# Patient Record
Sex: FEMALE | Race: WHITE | NOT HISPANIC OR LATINO | Employment: FULL TIME | URBAN - METROPOLITAN AREA
[De-identification: names, ages, dates, MRNs, and addresses within clinical notes are randomized per-mention and may not be internally consistent; named-entity substitution may affect disease eponyms.]

---

## 2021-04-16 ENCOUNTER — OFFICE VISIT (OUTPATIENT)
Dept: INTERNAL MEDICINE | Facility: CLINIC | Age: 26
End: 2021-04-16

## 2021-04-16 VITALS
DIASTOLIC BLOOD PRESSURE: 80 MMHG | HEART RATE: 84 BPM | WEIGHT: 229 LBS | SYSTOLIC BLOOD PRESSURE: 140 MMHG | BODY MASS INDEX: 35.94 KG/M2 | TEMPERATURE: 97.3 F | HEIGHT: 67 IN

## 2021-04-16 DIAGNOSIS — Z76.89 ENCOUNTER TO ESTABLISH CARE: ICD-10-CM

## 2021-04-16 DIAGNOSIS — F33.9 EPISODE OF RECURRENT MAJOR DEPRESSIVE DISORDER, UNSPECIFIED DEPRESSION EPISODE SEVERITY (HCC): ICD-10-CM

## 2021-04-16 DIAGNOSIS — E03.9 HYPOTHYROIDISM, UNSPECIFIED TYPE: ICD-10-CM

## 2021-04-16 DIAGNOSIS — E66.01 MORBID OBESITY (HCC): ICD-10-CM

## 2021-04-16 DIAGNOSIS — I10 ESSENTIAL HYPERTENSION: Primary | ICD-10-CM

## 2021-04-16 PROBLEM — E55.9 VITAMIN D DEFICIENCY: Status: ACTIVE | Noted: 2021-04-16

## 2021-04-16 PROCEDURE — 99204 OFFICE O/P NEW MOD 45 MIN: CPT | Performed by: NURSE PRACTITIONER

## 2021-04-16 RX ORDER — NORETHINDRONE ACETATE AND ETHINYL ESTRADIOL .03; 1.5 MG/1; MG/1
1 TABLET ORAL DAILY
COMMUNITY
Start: 2019-08-16 | End: 2021-06-28 | Stop reason: SDUPTHER

## 2021-04-16 RX ORDER — ESCITALOPRAM OXALATE 20 MG/1
1 TABLET ORAL EVERY MORNING
COMMUNITY
Start: 2014-08-16 | End: 2022-01-24 | Stop reason: SDUPTHER

## 2021-04-16 RX ORDER — LAMOTRIGINE 200 MG/1
1 TABLET ORAL EVERY MORNING
COMMUNITY
Start: 2014-08-16 | End: 2022-03-07 | Stop reason: SDUPTHER

## 2021-04-16 RX ORDER — HYDROCHLOROTHIAZIDE 12.5 MG/1
12.5 TABLET ORAL DAILY
Qty: 30 TABLET | Refills: 0 | Status: SHIPPED | OUTPATIENT
Start: 2021-04-16 | End: 2021-05-07 | Stop reason: SDUPTHER

## 2021-04-16 RX ORDER — LEVOTHYROXINE SODIUM 0.05 MG/1
50 TABLET ORAL DAILY
Qty: 90 TABLET | Refills: 0 | Status: SHIPPED | OUTPATIENT
Start: 2021-04-16 | End: 2021-06-21 | Stop reason: SDUPTHER

## 2021-04-16 NOTE — PROGRESS NOTES
Isabell HARDIN  1995  [id number]  Patient Care Team:  Yamilex Khan APRN as PCP - General (Internal Medicine)    Isabell HARDIN is a 25 y.o. here today to establish care.    Previously under the care of Dr. Nereyda Almaguer at [facility]  Chief Complaint   Patient presents with   • Hypertension       HPI:   Juanjo is here to establish care.  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  New problem: high bp readings 150s/100s noticed about 3 wks ago, asymptomatic, sister was checking and she checked out of curiosity.  Juanjo was surprised it was high and continued to check. bp -145/85-90    Seeing Idalia Katz at [facility] nutritional counseling for disordered eating habits.  Obese since age 16.    MDD:  Seeing behavioral health at [facility].  On escitalopram and lamictal. Last yr lamictal inc from 150 to 200    Psoriasis:  Followed by erick Treviño.  Has tried few rounds of stellara and more recently tremfaya.    Abnormal TSH recently:  Saw Dr. Almaguer recently, labs done 3/29/21 but she was not fasting.  Juanjo has results:   A1C 6.4, Glucose 126 (not fasting)  (Was 5.9 in 2019)  TSH 4.8   Elevated liver function tests: ast 64, alt 69 (Alt 49 and ast 33 in 2019)    Obesity: Dieting since age 15, competitive swimmer until age 16, weight 145-177 sophomore year, did watchers and lost down to 145 and maintained 155-160 until graduation. By sophmore year of college was up to 190, did wt watchers again and down to 168, past 3 yrs gradually gained, ~30# of that in past 18 mo.    History reviewed. No pertinent past medical history.  History reviewed. No pertinent surgical history.  Family History   Problem Relation Age of Onset   • Hypertension Mother    • Bipolar disorder Father    • Arthritis Maternal Grandmother    • Depression Maternal Grandmother    • Hypertension Maternal Grandmother    • Hyperlipidemia Maternal Grandmother    • Cancer Maternal Grandfather   "       melanoma?   • Arthritis Paternal Grandmother    • Depression Paternal Grandmother    • Hypertension Paternal Grandmother    • Cancer Paternal Grandfather         lung (smoker)   • Heart disease Paternal Grandfather      Social History     Tobacco Use   Smoking Status Never Smoker   Smokeless Tobacco Never Used     No Known Allergies    Current Outpatient Medications:   •  escitalopram (LEXAPRO) 20 MG tablet, Take 1 tablet by mouth Every Morning., Disp: , Rfl:   •  lamoTRIgine (LaMICtal) 200 MG tablet, Take 1 tablet by mouth Every Morning., Disp: , Rfl:   •  Norethindrone Acet-Ethinyl Est (Tessa 1.5/30) 1.5-30 MG-MCG tablet, Take 1 tablet by mouth Daily., Disp: , Rfl:   •  hydroCHLOROthiazide (HYDRODIURIL) 12.5 MG tablet, Take 1 tablet by mouth Daily., Disp: 30 tablet, Rfl: 0  •  levothyroxine (Synthroid) 50 MCG tablet, Take 1 tablet by mouth Daily., Disp: 90 tablet, Rfl: 0    Review of Systems   Constitutional: Negative for chills, fatigue and fever.   HENT: Negative for congestion, ear pain and sinus pressure.    Respiratory: Negative for cough, chest tightness, shortness of breath and wheezing.    Cardiovascular: Negative for chest pain and palpitations.   Gastrointestinal: Negative for abdominal pain, blood in stool and constipation.   Neurological: Positive for headache. Negative for dizziness and speech difficulty.   Psychiatric/Behavioral: Negative for decreased concentration. The patient is not nervous/anxious.        /80 (BP Location: Left arm, Patient Position: Sitting, Cuff Size: Adult)   Pulse 84   Temp 97.3 °F (36.3 °C) (Temporal)   Ht 170 cm (66.93\")   Wt 104 kg (229 lb)   BMI 35.94 kg/m²     Physical Exam  Constitutional:       Appearance: She is well-developed. She is obese.   HENT:      Head: Normocephalic and atraumatic.      Comments: *wearing mask  Eyes:      Conjunctiva/sclera: Conjunctivae normal.      Pupils: Pupils are equal, round, and reactive to light.   Cardiovascular: "      Rate and Rhythm: Normal rate and regular rhythm.      Pulses: Normal pulses.      Heart sounds: Normal heart sounds.   Pulmonary:      Effort: Pulmonary effort is normal.      Breath sounds: Normal breath sounds.   Musculoskeletal:         General: Normal range of motion.      Cervical back: Normal range of motion and neck supple.   Skin:     General: Skin is warm and dry.   Neurological:      Mental Status: She is alert and oriented to person, place, and time.   Psychiatric:         Mood and Affect: Mood normal.         Behavior: Behavior normal.         Thought Content: Thought content normal.         Judgment: Judgment normal.         Results Review:  I reviewed the patient's new clinical results.    Assessment/Plan:  Patient Instructions   Problem List Items Addressed This Visit        Endocrine and Metabolic    Morbid obesity (CMS/HCC)    Overview     Depression, htn            Mental Health    Episode of recurrent major depressive disorder (CMS/[facility])    Relevant Medications    escitalopram (LEXAPRO) 20 MG tablet      Other Visit Diagnoses     Essential hypertension    -  Primary    trial hctz 12.5mg, rtc 3 wks, continue home monitoring, decrease sodium intake, increase physical activity, wt loss encouraged    Relevant Medications    hydroCHLOROthiazide (HYDRODIURIL) 12.5 MG tablet    Hypothyroidism, unspecified type        trial of levo 50mcg, f/u labs in 8-12 wks    Relevant Medications    levothyroxine (Synthroid) 50 MCG tablet    Encounter to establish care                 Diagnosis Plan   1. Essential hypertension      trial hctz 12.5mg, rtc 3 wks, continue home monitoring, decrease sodium intake, increase physical activity, wt loss encouraged   2. Hypothyroidism, unspecified type      trial of levo 50mcg, f/u labs in 8-12 wks   3. Morbid obesity (CMS/[facility])      continue nutritional counseling with Idalia Katz RD   4. Episode of recurrent major depressive disorder, unspecified depression episode  severity (CMS/HCC)      continue with provider at [facility], on lamictal and escitalopram   5. Encounter to establish care         Patient Instructions   Start hydrochlorothiazide 12.5mg every morning, drink lots of water.  Check BP at home and keep a log for your next visit.    •In general, adults should engage in aerobic physical activity 3-4 times a week with each session lasting an average of 40 minutes.  Goal for 150 minutes per week total.  •Moderate (brisk walking or jogging) to vigorous (running or biking) physical activity is recommended.  •There are many helpful strategies for heart-healthy eating, including the DASH diet and the [employer]'s Choose My Plate.   •Patients with high blood pressure should consume no more than 2,400 mg of sodium a day, ideally reducing sodium intake to 1,500 mg a day. However, even reducing sodium intake in one's current diet by 1,000 mg each day can help lower blood pressure.    Limit alcohol consumption.    Information obtained from the American College of Cardiology and American Heart Association.      Start levothyroxine 50mcg daily first thing in am.    Hypothyroidism    Hypothyroidism is when the thyroid gland does not make enough of certain hormones (it is underactive). The thyroid gland is a small gland located in the lower front part of the neck, just in front of the windpipe (trachea). This gland makes hormones that help control how the body uses food for energy (metabolism) as well as how the heart and brain function. These hormones also play a role in keeping your bones strong. When the thyroid is underactive, it produces too little of the hormones thyroxine (T4) and triiodothyronine (T3).  What are the causes?  This condition may be caused by:  · Hashimoto's disease. This is a disease in which the body's disease-fighting system (immune system) attacks the thyroid gland. This is the most common cause.  · Viral infections.  · Pregnancy.  · Certain medicines.  · Birth  defects.  · Past radiation treatments to the head or neck for cancer.  · Past treatment with radioactive iodine.  · Past exposure to radiation in the environment.  · Past surgical removal of part or all of the thyroid.  · Problems with a gland in the center of the brain (pituitary gland).  · Lack of enough iodine in the diet.  What increases the risk?  You are more likely to develop this condition if:  · You are female.  · You have a family history of thyroid conditions.  · You use a medicine called lithium.  · You take medicines that affect the immune system (immunosuppressants).  What are the signs or symptoms?  Symptoms of this condition include:  · Feeling as though you have no energy (lethargy).  · Not being able to tolerate cold.  · Weight gain that is not explained by a change in diet or exercise habits.  · Lack of appetite.  · Dry skin.  · Coarse hair.  · Menstrual irregularity.  · Slowing of thought processes.  · Constipation.  · Sadness or depression.  How is this diagnosed?  This condition may be diagnosed based on:  · Your symptoms, your medical history, and a physical exam.  · Blood tests.  You may also have imaging tests, such as an ultrasound or MRI.  How is this treated?  This condition is treated with medicine that replaces the thyroid hormones that your body does not make. After you begin treatment, it may take several weeks for symptoms to go away.  Follow these instructions at home:  · Take over-the-counter and prescription medicines only as told by your health care provider.  · If you start taking any new medicines, tell your health care provider.  · Keep all follow-up visits as told by your health care provider. This is important.  ? As your condition improves, your dosage of thyroid hormone medicine may change.  ? You will need to have blood tests regularly so that your health care provider can monitor your condition.  Contact a health care provider if:  · Your symptoms do not get better with  "treatment.  · You are taking thyroid replacement medicine and you:  ? Sweat a lot.  ? Have tremors.  ? Feel anxious.  ? Lose weight rapidly.  ? Cannot tolerate heat.  ? Have emotional swings.  ? Have diarrhea.  ? Feel weak.  Get help right away if you have:  · Chest pain.  · An irregular heartbeat.  · A rapid heartbeat.  · Difficulty breathing.  Summary  · Hypothyroidism is when the thyroid gland does not make enough of certain hormones (it is underactive).  · When the thyroid is underactive, it produces too little of the hormones thyroxine (T4) and triiodothyronine (T3).  · The most common cause is Hashimoto's disease, a disease in which the body's disease-fighting system (immune system) attacks the thyroid gland. The condition can also be caused by viral infections, medicine, pregnancy, or past radiation treatment to the head or neck.  · Symptoms may include weight gain, dry skin, constipation, feeling as though you do not have energy, and not being able to tolerate cold.  · This condition is treated with medicine to replace the thyroid hormones that your body does not make.  This information is not intended to replace advice given to you by your health care provider. Make sure you discuss any questions you have with your health care provider.  Document Revised: 11/30/2018 Document Reviewed: 11/28/2018  [employer] Patient Education © 2021 [employer]    https://www.nhlbi.nih.gov/files/docs/public/heart/dash_brief.pdf\">   DASH Eating Plan  DASH stands for Dietary Approaches to Stop Hypertension. The DASH eating plan is a healthy eating plan that has been shown to:  · Reduce high blood pressure (hypertension).  · Reduce your risk for type 2 diabetes, heart disease, and stroke.  · Help with weight loss.  What are tips for following this plan?  Reading food labels  · Check food labels for the amount of salt (sodium) per serving. Choose foods with less than 5 percent of the Daily Value of sodium. Generally, foods with " "less than 300 milligrams (mg) of sodium per serving fit into this eating plan.  · To find whole grains, look for the word \"whole\" as the first word in the ingredient list.  Shopping  · Buy products labeled as \"low-sodium\" or \"no salt added.\"  · Buy fresh foods. Avoid canned foods and pre-made or frozen meals.  Cooking  · Avoid adding salt when cooking. Use salt-free seasonings or herbs instead of table salt or sea salt. Check with your health care provider or pharmacist before using salt substitutes.  · Do not thomas foods. Cook foods using healthy methods such as baking, boiling, grilling, roasting, and broiling instead.  · Cook with heart-healthy oils, such as olive, canola, avocado, soybean, or sunflower oil.  Meal planning    · Eat a balanced diet that includes:  ? 4 or more servings of fruits and 4 or more servings of vegetables each day. Try to fill one-half of your plate with fruits and vegetables.  ? 6-8 servings of whole grains each day.  ? Less than 6 oz (170 g) of lean meat, poultry, or fish each day. A 3-oz (85-g) serving of meat is about the same size as a deck of cards. One egg equals 1 oz (28 g).  ? 2-3 servings of low-fat dairy each day. One serving is 1 cup (237 mL).  ? 1 serving of nuts, seeds, or beans 5 times each week.  ? 2-3 servings of heart-healthy fats. Healthy fats called omega-3 fatty acids are found in foods such as walnuts, flaxseeds, fortified milks, and eggs. These fats are also found in cold-water fish, such as sardines, salmon, and mackerel.  · Limit how much you eat of:  ? Canned or prepackaged foods.  ? Food that is high in trans fat, such as some fried foods.  ? Food that is high in saturated fat, such as fatty meat.  ? Desserts and other sweets, sugary drinks, and other foods with added sugar.  ? Full-fat dairy products.  · Do not salt foods before eating.  · Do not eat more than 4 egg yolks a week.  · Try to eat at least 2 vegetarian meals a week.  · Eat more home-cooked food and " less restaurant, buffet, and fast food.  Lifestyle  · When eating at a restaurant, ask that your food be prepared with less salt or no salt, if possible.  · If you drink alcohol:  ? Limit how much you use to:  § 0-1 drink a day for women who are not pregnant.  § 0-2 drinks a day for men.  ? Be aware of how much alcohol is in your drink. In the [place] one drink equals one 12 oz bottle of beer (355 mL), one 5 oz glass of wine (148 mL), or one 1½ oz glass of hard liquor (44 mL).  General information  · Avoid eating more than 2,300 mg of salt a day. If you have hypertension, you may need to reduce your sodium intake to 1,500 mg a day.  · Work with your health care provider to maintain a healthy body weight or to lose weight. Ask what an ideal weight is for you.  · Get at least 30 minutes of exercise that causes your heart to beat faster (aerobic exercise) most days of the week. Activities may include walking, swimming, or biking.  · Work with your health care provider or dietitian to adjust your eating plan to your individual calorie needs.  What foods should I eat?  Fruits  All fresh, dried, or frozen fruit. Canned fruit in natural juice (without added sugar).  Vegetables  Fresh or frozen vegetables (raw, steamed, roasted, or grilled). Low-sodium or reduced-sodium tomato and vegetable juice. Low-sodium or reduced-sodium tomato sauce and tomato paste. Low-sodium or reduced-sodium canned vegetables.  Grains  Whole-grain or whole-wheat bread. Whole-grain or whole-wheat pasta. Brown rice. Oatmeal. Quinoa. Bulgur. Whole-grain and low-sodium cereals. Stephanie bread. Low-fat, low-sodium crackers. Whole-wheat flour tortillas.  Meats and other proteins  Skinless chicken or turkey. Ground chicken or turkey. Pork with fat trimmed off. Fish and seafood. Egg whites. Dried beans, peas, or lentils. Unsalted nuts, nut butters, and seeds. Unsalted canned beans. Lean cuts of beef with fat trimmed off. Low-sodium, lean precooked or cured  meat, such as sausages or meat loaves.  Dairy  Low-fat (1%) or fat-free (skim) milk. Reduced-fat, low-fat, or fat-free cheeses. Nonfat, low-sodium ricotta or cottage cheese. Low-fat or nonfat yogurt. Low-fat, low-sodium cheese.  Fats and oils  Soft margarine without trans fats. Vegetable oil. Reduced-fat, low-fat, or light mayonnaise and salad dressings (reduced-sodium). Canola, safflower, olive, avocado, soybean, and sunflower oils. Avocado.  Seasonings and condiments  Herbs. Spices. Seasoning mixes without salt.  Other foods  Unsalted popcorn and pretzels. Fat-free sweets.  The items listed above may not be a complete list of foods and beverages you can eat. Contact a dietitian for more information.  What foods should I avoid?  Fruits  Canned fruit in a light or heavy syrup. Fried fruit. Fruit in cream or butter sauce.  Vegetables  Creamed or fried vegetables. Vegetables in a cheese sauce. Regular canned vegetables (not low-sodium or reduced-sodium). Regular canned tomato sauce and paste (not low-sodium or reduced-sodium). Regular tomato and vegetable juice (not low-sodium or reduced-sodium). Pickles. Olives.  Grains  Baked goods made with fat, such as croissants, muffins, or some breads. Dry pasta or rice meal packs.  Meats and other proteins  Fatty cuts of meat. Ribs. Fried meat. Payton. Bologna, salami, and other precooked or cured meats, such as sausages or meat loaves. Fat from the back of a pig (fatback). Bratwurst. Salted nuts and seeds. Canned beans with added salt. Canned or smoked fish. Whole eggs or egg yolks. Chicken or turkey with skin.  Dairy  Whole or 2% milk, cream, and half-and-half. Whole or full-fat cream cheese. Whole-fat or sweetened yogurt. Full-fat cheese. Nondairy creamers. Whipped toppings. Processed cheese and cheese spreads.  Fats and oils  Butter. Stick margarine. Lard. Shortening. Ghee. Payton fat. Tropical oils, such as coconut, palm kernel, or palm oil.  Seasonings and  condiments  Onion salt, garlic salt, seasoned salt, table salt, and sea salt. Worcestershire sauce. Tartar sauce. Barbecue sauce. Teriyaki sauce. Soy sauce, including reduced-sodium. Steak sauce. Canned and packaged gravies. Fish sauce. Oyster sauce. Cocktail sauce. Store-bought horseradish. Ketchup. Mustard. Meat flavorings and tenderizers. Bouillon cubes. Hot sauces. Pre-made or packaged marinades. Pre-made or packaged taco seasonings. Relishes. Regular salad dressings.  Other foods  Salted popcorn and pretzels.  The items listed above may not be a complete list of foods and beverages you should avoid. Contact a dietitian for more information.  Where to find more information  · National Heart, Lung, and Blood [place]: www.nhlbi.nih.gov  · American Heart Association: www.heart.org  · Academy of Nutrition and Dietetics: www.eatright.org  · National Kidney Foundation: www.kidney.org  Summary  · The DASH eating plan is a healthy eating plan that has been shown to reduce high blood pressure (hypertension). It may also reduce your risk for type 2 diabetes, heart disease, and stroke.  · When on the DASH eating plan, aim to eat more fresh fruits and vegetables, whole grains, lean proteins, low-fat dairy, and heart-healthy fats.  · With the DASH eating plan, you should limit salt (sodium) intake to 2,300 mg a day. If you have hypertension, you may need to reduce your sodium intake to 1,500 mg a day.  · Work with your health care provider or dietitian to adjust your eating plan to your individual calorie needs.  This information is not intended to replace advice given to you by your health care provider. Make sure you discuss any questions you have with your health care provider.  Document Revised: 11/20/2020 Document Reviewed: 11/20/2020  [employer]vier Patient Education © 2021 [employer]  Hydrochlorothiazide, HCTZ Oral Capsules or Tablets  What is this medicine?  HYDROCHLOROTHIAZIDE (aliyah droe klor oh THYE a zide) is a  diuretic. It helps you make more urine and to lose salt and excess water from your body. It treats swelling from heart, kidney, or liver disease. It also treats high blood pressure.  This medicine may be used for other purposes; ask your health care provider or pharmacist if you have questions.  COMMON BRAND NAME(S): Esidrix, Ezide, HydroDIURIL, Microzide, Oretic, Zide  What should I tell my health care provider before I take this medicine?  They need to know if you have any of these conditions:  · diabetes  · gout  · immune system problems, like lupus  · kidney disease or kidney stones  · liver disease  · pancreatitis  · small amount of urine or difficulty passing urine  · an unusual or allergic reaction to hydrochlorothiazide, sulfa drugs, other medicines, foods, dyes, or preservatives  · pregnant or trying to get pregnant  · breast-feeding  How should I use this medicine?  Take this drug by mouth. Take it as directed on the prescription label at the same time every day. You can take it with or without food. If it upsets your stomach, take it with food. Keep taking it unless your health care provider tells you to stop.  Talk to your health care provider about the use of this drug in children. While it may be prescribed for children as young as newborns for selected conditions, precautions do apply.  Overdosage: If you think you have taken too much of this medicine contact a poison control center or emergency room at once.  NOTE: This medicine is only for you. Do not share this medicine with others.  What if I miss a dose?  If you miss a dose, take it as soon as you can. If it is almost time for your next dose, take only that dose. Do not take double or extra doses.  What may interact with this medicine?  · cholestyramine  · colestipol  · digoxin  · dofetilide  · lithium  · medicines for blood pressure  · medicines for diabetes  · medicines that relax muscles for surgery  · other diuretics  · steroid medicines like  prednisone or cortisone  This list may not describe all possible interactions. Give your health care provider a list of all the medicines, herbs, non-prescription drugs, or dietary supplements you use. Also tell them if you smoke, drink alcohol, or use illegal drugs. Some items may interact with your medicine.  What should I watch for while using this medicine?  Visit your doctor or health care professional for regular checks on your progress. Check your blood pressure as directed. Ask your doctor or health care professional what your blood pressure should be and when you should contact him or her.  Talk to your health care professional about your risk of skin cancer. You may be more at risk for skin cancer if you take this medicine.  This medicine can make you more sensitive to the sun. Keep out of the sun. If you cannot avoid being in the sun, wear protective clothing and use sunscreen. Do not use sun lamps or tanning beds/booths.  You may need to be on a special diet while taking this medicine. Ask your doctor.  Check with your doctor or health care professional if you get an attack of severe diarrhea, nausea and vomiting, or if you sweat a lot. The loss of too much body fluid can make it dangerous for you to take this medicine.  You may get drowsy or dizzy. Do not drive, use machinery, or do anything that needs mental alertness until you know how this medicine affects you. Do not stand or sit up quickly, especially if you are an older patient. This reduces the risk of dizzy or fainting spells. Alcohol may interfere with the effect of this medicine. Avoid alcoholic drinks.  This medicine may increase blood sugar. Ask your healthcare provider if changes in diet or medicines are needed if you have diabetes.  What side effects may I notice from receiving this medicine?  Side effects that you should report to your doctor or health care professional as soon as possible:  · allergic reactions such as skin rash or  itching, hives, swelling of the lips, mouth, tongue, or throat  · changes in vision  · chest pain  · eye pain  · fast or irregular heartbeat  · feeling faint or lightheaded, falls  · gout attack  · muscle pain or cramps  · pain or difficulty when passing urine  · pain, tingling, numbness in the hands or feet  · redness, blistering, peeling or loosening of the skin, including inside the mouth  ·   signs and symptoms of high blood sugar such as being more thirsty or hungry or having to urinate more than normal. You may also feel very tired or have blurry vision.  · unusually weak  Side effects that usually do not require medical attention (report to your doctor or health care professional if they continue or are bothersome):  · change in sex drive or performance  · dry mouth  · headache  · stomach upset  This list may not describe all possible side effects. Call your doctor for medical advice about side effects. You may report side effects to FDA at 4-964-QBT-1785.  Where should I keep my medicine?  Keep out of the reach of children and pets.  Store at room temperature between 20 and 25 degrees C (68 and 77 degrees F). Protect from light and moisture. Keep the container tightly closed. Do not freeze. Throw away any unused drug after the expiration date.  NOTE: This sheet is a summary. It may not cover all possible information. If you have questions about this medicine, talk to your doctor, pharmacist, or health care provider.  © 2021 [employer] (2020-08-20 16:52:59)    Levothyroxine oral capsules  What is this medicine?  LEVOTHYROXINE (mary ellen voe thye EDINSON een) is a thyroid hormone. This medicine can improve symptoms of thyroid deficiency such as slow speech, lack of energy, weight gain, hair loss, dry skin, and feeling cold. It also helps to treat goiter (an enlarged thyroid gland). It is also used to treat some kinds of thyroid cancer along with surgery and other medicines.  This medicine may be used for  other purposes; ask your health care provider or pharmacist if you have questions.  COMMON BRAND NAME(S): Tirosint  What should I tell my health care provider before I take this medicine?  They need to know if you have any of these conditions:  · Mod[place]'s disease or other adrenal gland problem  · angina  · bone problems  · diabetes  · dieting or on a weight loss program  · fertility problems  · heart disease  · pituitary gland problem  · take medicines that treat or prevent blood clots  · an unusual or allergic reaction to levothyroxine, thyroid hormones, glycerin, glycerol, other medicines, foods, dyes, or preservatives  · pregnant or trying to get pregnant  · breast-feeding  How should I use this medicine?  Take this medicine by mouth with a glass of water. It is best to take on an empty stomach, at least 30 minutes to one hour before breakfast. Avoid taking antacids containing aluminum or magnesium, simethicone, bile acid sequestrants, calcium carbonate, sodium polystyrene sulfonate, ferrous sulfate, sevelamer, lanthanum, or sucralfate within 4 hours of taking this medicine. Do not cut, crush or chew this medicine. Follow the directions on the prescription label. Take at the same time each day. Do not take your medicine more often than directed.  Talk to your pediatrician regarding the use of this medicine in children. While this drug may be prescribed for selected conditions, precautions do apply. Since the capsules cannot be crushed or placed in water, they may only be given to infants and children who are able to swallow an intact capsule.  Overdosage: If you think you have taken too much of this medicine contact a poison control center or emergency room at once.  NOTE: This medicine is only for you. Do not share this medicine with others.  What if I miss a dose?  If you miss a dose, take it as soon as you can. If it is almost time for your next dose, take only that dose. Do not take double or extra  doses.  What may interact with this medicine?  · amiodarone  · antacids  · anti-thyroid medicines  · calcium supplements  · carbamazepine  · certain medicines for depression  · certain medicines to treat cancer  · cholestyramine  · clofibrate  · colesevelam  · colestipol  · digoxin  · female hormones, like estrogens and birth control pills, patches, rings, or injections  · iron supplements  · glyoxylate  · ketamine  · lanthanum  · liquid nutrition products like Ensure  · lithium  · medicines for colds and breathing difficulties  · medicines for diabetes  · medicines or dietary supplements for weight loss  · methadone  · niacin  · orlistat  · oxandrolone  · phenobarbital or other barbiturates  · phenytoin  · rifampin  · sevelamer  · simethicone  · sodium polystyrene sulfonate  · soy isoflavones  · steroid medicines like prednisone or cortisone  · sucralfate  · testosterone  · theophylline  · warfarin  This list may not describe all possible interactions. Give your health care provider a list of all the medicines, herbs, non-prescription drugs, or dietary supplements you use. Also tell them if you smoke, drink alcohol, or use illegal drugs. Some items may interact with your medicine.  What should I watch for while using this medicine?  Do not switch brands of this medicine unless your health care professional agrees with the change. Ask questions if you are uncertain.  You will need regular exams and occasional blood tests to check the response to treatment. If you are receiving this medicine for an underactive thyroid, it may be several weeks before you notice an improvement. Check with your doctor or health care professional if your symptoms do not improve.  It may be necessary for you to take this medicine for the rest of your life. Do not stop using this medicine unless your doctor or health care professional advises you to.  This medicine can affect blood sugar levels. If you have diabetes, check your blood sugar  as directed.  You may lose some of your hair when you first start treatment. With time, this usually corrects itself.  If you are going to have surgery, tell your doctor or health care professional that you are taking this medicine.  What side effects may I notice from receiving this medicine?  Side effects that you should report to your doctor or health care professional as soon as possible:  · allergic reactions like skin rash, itching or hives, swelling of the face, lips, or tongue  · anxious  · breathing problems  · changes in menstrual periods  · chest pain  · diarrhea  · excessive sweating or intolerance to heat  · fast or irregular heartbeat  · leg cramps  · nervousness  · swelling of ankles, feet, or legs  · tremors  · trouble sleeping  · vomiting  Side effects that usually do not require medical attention (report to your doctor or health care professional if they continue or are bothersome):  · changes in appetite  · headache  · irritable  · nausea  · weight loss  This list may not describe all possible side effects. Call your doctor for medical advice about side effects. You may report side effects to FDA at 8-308-FDA-4727.  Where should I keep my medicine?  Keep out of the reach of children.  Store at room temperature between 15 and 30 degrees C (59 and 86 degrees F). Protect from light and moisture. Keep container tightly closed. Throw away any unused medicine after the expiration date.  NOTE: This sheet is a summary. It may not cover all possible information. If you have questions about this medicine, talk to your doctor, pharmacist, or health care provider.  © 2021 [employer] (2020-12-10 13:23:17)        Plan of care reviewed with patient at the conclusion of today's visit. Education was provided regarding diagnosis and management.  Patient verbalizes understanding of and agreement with management plan.    Return in about 3 weeks (around 5/7/2021) for Recheck.    * Please note that portions  of this note were completed with a voice recognition program. Efforts were made to edit the dictation but occasionally words are transcribed.     Yamilex Khan APRN

## 2021-04-17 PROBLEM — E66.01 MORBID OBESITY: Status: ACTIVE | Noted: 2021-04-17

## 2021-04-17 PROBLEM — F33.9 EPISODE OF RECURRENT MAJOR DEPRESSIVE DISORDER: Status: ACTIVE | Noted: 2021-04-17

## 2021-04-17 NOTE — PATIENT INSTRUCTIONS
Start hydrochlorothiazide 12.5mg every morning, drink lots of water.  Check BP at home and keep a log for your next visit.    •In general, adults should engage in aerobic physical activity 3-4 times a week with each session lasting an average of 40 minutes.  Goal for 150 minutes per week total.  •Moderate (brisk walking or jogging) to vigorous (running or biking) physical activity is recommended.  •There are many helpful strategies for heart-healthy eating, including the DASH diet and the Mommy Nearest's Choose My Plate.   •Patients with high blood pressure should consume no more than 2,400 mg of sodium a day, ideally reducing sodium intake to 1,500 mg a day. However, even reducing sodium intake in one's current diet by 1,000 mg each day can help lower blood pressure.    Limit alcohol consumption.    Information obtained from the American College of Cardiology and American Heart Association.      Start levothyroxine 50mcg daily first thing in am.    Hypothyroidism    Hypothyroidism is when the thyroid gland does not make enough of certain hormones (it is underactive). The thyroid gland is a small gland located in the lower front part of the neck, just in front of the windpipe (trachea). This gland makes hormones that help control how the body uses food for energy (metabolism) as well as how the heart and brain function. These hormones also play a role in keeping your bones strong. When the thyroid is underactive, it produces too little of the hormones thyroxine (T4) and triiodothyronine (T3).  What are the causes?  This condition may be caused by:  · Hashimoto's disease. This is a disease in which the body's disease-fighting system (immune system) attacks the thyroid gland. This is the most common cause.  · Viral infections.  · Pregnancy.  · Certain medicines.  · Birth defects.  · Past radiation treatments to the head or neck for cancer.  · Past treatment with radioactive iodine.  · Past exposure to radiation in the  environment.  · Past surgical removal of part or all of the thyroid.  · Problems with a gland in the center of the brain (pituitary gland).  · Lack of enough iodine in the diet.  What increases the risk?  You are more likely to develop this condition if:  · You are female.  · You have a family history of thyroid conditions.  · You use a medicine called lithium.  · You take medicines that affect the immune system (immunosuppressants).  What are the signs or symptoms?  Symptoms of this condition include:  · Feeling as though you have no energy (lethargy).  · Not being able to tolerate cold.  · Weight gain that is not explained by a change in diet or exercise habits.  · Lack of appetite.  · Dry skin.  · Coarse hair.  · Menstrual irregularity.  · Slowing of thought processes.  · Constipation.  · Sadness or depression.  How is this diagnosed?  This condition may be diagnosed based on:  · Your symptoms, your medical history, and a physical exam.  · Blood tests.  You may also have imaging tests, such as an ultrasound or MRI.  How is this treated?  This condition is treated with medicine that replaces the thyroid hormones that your body does not make. After you begin treatment, it may take several weeks for symptoms to go away.  Follow these instructions at home:  · Take over-the-counter and prescription medicines only as told by your health care provider.  · If you start taking any new medicines, tell your health care provider.  · Keep all follow-up visits as told by your health care provider. This is important.  ? As your condition improves, your dosage of thyroid hormone medicine may change.  ? You will need to have blood tests regularly so that your health care provider can monitor your condition.  Contact a health care provider if:  · Your symptoms do not get better with treatment.  · You are taking thyroid replacement medicine and you:  ? Sweat a lot.  ? Have tremors.  ? Feel anxious.  ? Lose weight rapidly.  ? Cannot  "tolerate heat.  ? Have emotional swings.  ? Have diarrhea.  ? Feel weak.  Get help right away if you have:  · Chest pain.  · An irregular heartbeat.  · A rapid heartbeat.  · Difficulty breathing.  Summary  · Hypothyroidism is when the thyroid gland does not make enough of certain hormones (it is underactive).  · When the thyroid is underactive, it produces too little of the hormones thyroxine (T4) and triiodothyronine (T3).  · The most common cause is Hashimoto's disease, a disease in which the body's disease-fighting system (immune system) attacks the thyroid gland. The condition can also be caused by viral infections, medicine, pregnancy, or past radiation treatment to the head or neck.  · Symptoms may include weight gain, dry skin, constipation, feeling as though you do not have energy, and not being able to tolerate cold.  · This condition is treated with medicine to replace the thyroid hormones that your body does not make.  This information is not intended to replace advice given to you by your health care provider. Make sure you discuss any questions you have with your health care provider.  Document Revised: 11/30/2018 Document Reviewed: 11/28/2018  [employer] Patient Education © 2021 [employer]    https://www.nhlbi.nih.gov/files/docs/public/heart/dash_brief.pdf\">   DASH Eating Plan  DASH stands for Dietary Approaches to Stop Hypertension. The DASH eating plan is a healthy eating plan that has been shown to:  · Reduce high blood pressure (hypertension).  · Reduce your risk for type 2 diabetes, heart disease, and stroke.  · Help with weight loss.  What are tips for following this plan?  Reading food labels  · Check food labels for the amount of salt (sodium) per serving. Choose foods with less than 5 percent of the Daily Value of sodium. Generally, foods with less than 300 milligrams (mg) of sodium per serving fit into this eating plan.  · To find whole grains, look for the word \"whole\" as the first word in " "the ingredient list.  Shopping  · Buy products labeled as \"low-sodium\" or \"no salt added.\"  · Buy fresh foods. Avoid canned foods and pre-made or frozen meals.  Cooking  · Avoid adding salt when cooking. Use salt-free seasonings or herbs instead of table salt or sea salt. Check with your health care provider or pharmacist before using salt substitutes.  · Do not thomas foods. Cook foods using healthy methods such as baking, boiling, grilling, roasting, and broiling instead.  · Cook with heart-healthy oils, such as olive, canola, avocado, soybean, or sunflower oil.  Meal planning    · Eat a balanced diet that includes:  ? 4 or more servings of fruits and 4 or more servings of vegetables each day. Try to fill one-half of your plate with fruits and vegetables.  ? 6-8 servings of whole grains each day.  ? Less than 6 oz (170 g) of lean meat, poultry, or fish each day. A 3-oz (85-g) serving of meat is about the same size as a deck of cards. One egg equals 1 oz (28 g).  ? 2-3 servings of low-fat dairy each day. One serving is 1 cup (237 mL).  ? 1 serving of nuts, seeds, or beans 5 times each week.  ? 2-3 servings of heart-healthy fats. Healthy fats called omega-3 fatty acids are found in foods such as walnuts, flaxseeds, fortified milks, and eggs. These fats are also found in cold-water fish, such as sardines, salmon, and mackerel.  · Limit how much you eat of:  ? Canned or prepackaged foods.  ? Food that is high in trans fat, such as some fried foods.  ? Food that is high in saturated fat, such as fatty meat.  ? Desserts and other sweets, sugary drinks, and other foods with added sugar.  ? Full-fat dairy products.  · Do not salt foods before eating.  · Do not eat more than 4 egg yolks a week.  · Try to eat at least 2 vegetarian meals a week.  · Eat more home-cooked food and less restaurant, buffet, and fast food.  Lifestyle  · When eating at a restaurant, ask that your food be prepared with less salt or no salt, if " possible.  · If you drink alcohol:  ? Limit how much you use to:  § 0-1 drink a day for women who are not pregnant.  § 0-2 drinks a day for men.  ? Be aware of how much alcohol is in your drink. In the [place] one drink equals one 12 oz bottle of beer (355 mL), one 5 oz glass of wine (148 mL), or one 1½ oz glass of hard liquor (44 mL).  General information  · Avoid eating more than 2,300 mg of salt a day. If you have hypertension, you may need to reduce your sodium intake to 1,500 mg a day.  · Work with your health care provider to maintain a healthy body weight or to lose weight. Ask what an ideal weight is for you.  · Get at least 30 minutes of exercise that causes your heart to beat faster (aerobic exercise) most days of the week. Activities may include walking, swimming, or biking.  · Work with your health care provider or dietitian to adjust your eating plan to your individual calorie needs.  What foods should I eat?  Fruits  All fresh, dried, or frozen fruit. Canned fruit in natural juice (without added sugar).  Vegetables  Fresh or frozen vegetables (raw, steamed, roasted, or grilled). Low-sodium or reduced-sodium tomato and vegetable juice. Low-sodium or reduced-sodium tomato sauce and tomato paste. Low-sodium or reduced-sodium canned vegetables.  Grains  Whole-grain or whole-wheat bread. Whole-grain or whole-wheat pasta. Brown rice. Oatmeal. Quinoa. Bulgur. Whole-grain and low-sodium cereals. Stephanie bread. Low-fat, low-sodium crackers. Whole-wheat flour tortillas.  Meats and other proteins  Skinless chicken or turkey. Ground chicken or turkey. Pork with fat trimmed off. Fish and seafood. Egg whites. Dried beans, peas, or lentils. Unsalted nuts, nut butters, and seeds. Unsalted canned beans. Lean cuts of beef with fat trimmed off. Low-sodium, lean precooked or cured meat, such as sausages or meat loaves.  Dairy  Low-fat (1%) or fat-free (skim) milk. Reduced-fat, low-fat, or fat-free cheeses. Nonfat, low-sodium  ricotta or cottage cheese. Low-fat or nonfat yogurt. Low-fat, low-sodium cheese.  Fats and oils  Soft margarine without trans fats. Vegetable oil. Reduced-fat, low-fat, or light mayonnaise and salad dressings (reduced-sodium). Canola, safflower, olive, avocado, soybean, and sunflower oils. Avocado.  Seasonings and condiments  Herbs. Spices. Seasoning mixes without salt.  Other foods  Unsalted popcorn and pretzels. Fat-free sweets.  The items listed above may not be a complete list of foods and beverages you can eat. Contact a dietitian for more information.  What foods should I avoid?  Fruits  Canned fruit in a light or heavy syrup. Fried fruit. Fruit in cream or butter sauce.  Vegetables  Creamed or fried vegetables. Vegetables in a cheese sauce. Regular canned vegetables (not low-sodium or reduced-sodium). Regular canned tomato sauce and paste (not low-sodium or reduced-sodium). Regular tomato and vegetable juice (not low-sodium or reduced-sodium). Pickles. Olives.  Grains  Baked goods made with fat, such as croissants, muffins, or some breads. Dry pasta or rice meal packs.  Meats and other proteins  Fatty cuts of meat. Ribs. Fried meat. Payton. Bologna, salami, and other precooked or cured meats, such as sausages or meat loaves. Fat from the back of a pig (fatback). Bratwurst. Salted nuts and seeds. Canned beans with added salt. Canned or smoked fish. Whole eggs or egg yolks. Chicken or turkey with skin.  Dairy  Whole or 2% milk, cream, and half-and-half. Whole or full-fat cream cheese. Whole-fat or sweetened yogurt. Full-fat cheese. Nondairy creamers. Whipped toppings. Processed cheese and cheese spreads.  Fats and oils  Butter. Stick margarine. Lard. Shortening. Ghee. Payton fat. Tropical oils, such as coconut, palm kernel, or palm oil.  Seasonings and condiments  Onion salt, garlic salt, seasoned salt, table salt, and sea salt. Worcestershire sauce. Tartar sauce. Barbecue sauce. Teriyaki sauce. Soy sauce,  including reduced-sodium. Steak sauce. Canned and packaged gravies. Fish sauce. Oyster sauce. Cocktail sauce. Store-bought horseradish. Ketchup. Mustard. Meat flavorings and tenderizers. Bouillon cubes. Hot sauces. Pre-made or packaged marinades. Pre-made or packaged taco seasonings. Relishes. Regular salad dressings.  Other foods  Salted popcorn and pretzels.  The items listed above may not be a complete list of foods and beverages you should avoid. Contact a dietitian for more information.  Where to find more information  · National Heart, Lung, and [place]: www.nhlbi.nih.gov  · American Heart Association: www.heart.org  · Academy of Nutrition and Dietetics: www.eatright.org  · National Kidney Foundation: www.kidney.org  Summary  · The DASH eating plan is a healthy eating plan that has been shown to reduce high blood pressure (hypertension). It may also reduce your risk for type 2 diabetes, heart disease, and stroke.  · When on the DASH eating plan, aim to eat more fresh fruits and vegetables, whole grains, lean proteins, low-fat dairy, and heart-healthy fats.  · With the DASH eating plan, you should limit salt (sodium) intake to 2,300 mg a day. If you have hypertension, you may need to reduce your sodium intake to 1,500 mg a day.  · Work with your health care provider or dietitian to adjust your eating plan to your individual calorie needs.  This information is not intended to replace advice given to you by your health care provider. Make sure you discuss any questions you have with your health care provider.  Document Revised: 11/20/2020 Document Reviewed: 11/20/2020  [employer] Patient Education © 2021 [employer]  Hydrochlorothiazide, HCTZ Oral Capsules or Tablets  What is this medicine?  HYDROCHLOROTHIAZIDE (aliyah droe klor oh THYE a zide) is a diuretic. It helps you make more urine and to lose salt and excess water from your body. It treats swelling from heart, kidney, or liver disease. It also treats  high blood pressure.  This medicine may be used for other purposes; ask your health care provider or pharmacist if you have questions.  COMMON BRAND NAME(S): Esidrix, Ezide, HydroDIURIL, Microzide, Oretic, Zide  What should I tell my health care provider before I take this medicine?  They need to know if you have any of these conditions:  · diabetes  · gout  · immune system problems, like lupus  · kidney disease or kidney stones  · liver disease  · pancreatitis  · small amount of urine or difficulty passing urine  · an unusual or allergic reaction to hydrochlorothiazide, sulfa drugs, other medicines, foods, dyes, or preservatives  · pregnant or trying to get pregnant  · breast-feeding  How should I use this medicine?  Take this drug by mouth. Take it as directed on the prescription label at the same time every day. You can take it with or without food. If it upsets your stomach, take it with food. Keep taking it unless your health care provider tells you to stop.  Talk to your health care provider about the use of this drug in children. While it may be prescribed for children as young as newborns for selected conditions, precautions do apply.  Overdosage: If you think you have taken too much of this medicine contact a poison control center or emergency room at once.  NOTE: This medicine is only for you. Do not share this medicine with others.  What if I miss a dose?  If you miss a dose, take it as soon as you can. If it is almost time for your next dose, take only that dose. Do not take double or extra doses.  What may interact with this medicine?  · cholestyramine  · colestipol  · digoxin  · dofetilide  · lithium  · medicines for blood pressure  · medicines for diabetes  · medicines that relax muscles for surgery  · other diuretics  · steroid medicines like prednisone or cortisone  This list may not describe all possible interactions. Give your health care provider a list of all the medicines, herbs,  non-prescription drugs, or dietary supplements you use. Also tell them if you smoke, drink alcohol, or use illegal drugs. Some items may interact with your medicine.  What should I watch for while using this medicine?  Visit your doctor or health care professional for regular checks on your progress. Check your blood pressure as directed. Ask your doctor or health care professional what your blood pressure should be and when you should contact him or her.  Talk to your health care professional about your risk of skin cancer. You may be more at risk for skin cancer if you take this medicine.  This medicine can make you more sensitive to the sun. Keep out of the sun. If you cannot avoid being in the sun, wear protective clothing and use sunscreen. Do not use sun lamps or tanning beds/booths.  You may need to be on a special diet while taking this medicine. Ask your doctor.  Check with your doctor or health care professional if you get an attack of severe diarrhea, nausea and vomiting, or if you sweat a lot. The loss of too much body fluid can make it dangerous for you to take this medicine.  You may get drowsy or dizzy. Do not drive, use machinery, or do anything that needs mental alertness until you know how this medicine affects you. Do not stand or sit up quickly, especially if you are an older patient. This reduces the risk of dizzy or fainting spells. Alcohol may interfere with the effect of this medicine. Avoid alcoholic drinks.  This medicine may increase blood sugar. Ask your healthcare provider if changes in diet or medicines are needed if you have diabetes.  What side effects may I notice from receiving this medicine?  Side effects that you should report to your doctor or health care professional as soon as possible:  · allergic reactions such as skin rash or itching, hives, swelling of the lips, mouth, tongue, or throat  · changes in vision  · chest pain  · eye pain  · fast or irregular heartbeat  · feeling  faint or lightheaded, falls  · gout attack  · muscle pain or cramps  · pain or difficulty when passing urine  · pain, tingling, numbness in the hands or feet  · redness, blistering, peeling or loosening of the skin, including inside the mouth  ·   signs and symptoms of high blood sugar such as being more thirsty or hungry or having to urinate more than normal. You may also feel very tired or have blurry vision.  · unusually weak  Side effects that usually do not require medical attention (report to your doctor or health care professional if they continue or are bothersome):  · change in sex drive or performance  · dry mouth  · headache  · stomach upset  This list may not describe all possible side effects. Call your doctor for medical advice about side effects. You may report side effects to FDA at 3-057-FDA-5617.  Where should I keep my medicine?  Keep out of the reach of children and pets.  Store at room temperature between 20 and 25 degrees C (68 and 77 degrees F). Protect from light and moisture. Keep the container tightly closed. Do not freeze. Throw away any unused drug after the expiration date.  NOTE: This sheet is a summary. It may not cover all possible information. If you have questions about this medicine, talk to your doctor, pharmacist, or health care provider.  © 2021 [employer] (2020-08-20 16:52:59)    Levothyroxine oral capsules  What is this medicine?  LEVOTHYROXINE (mary ellen voe thye EDINSON een) is a thyroid hormone. This medicine can improve symptoms of thyroid deficiency such as slow speech, lack of energy, weight gain, hair loss, dry skin, and feeling cold. It also helps to treat goiter (an enlarged thyroid gland). It is also used to treat some kinds of thyroid cancer along with surgery and other medicines.  This medicine may be used for other purposes; ask your health care provider or pharmacist if you have questions.  COMMON BRAND NAME(S): Tirosint  What should I tell my health care  provider before I take this medicine?  They need to know if you have any of these conditions:  · Jackson's disease or other adrenal gland problem  · angina  · bone problems  · diabetes  · dieting or on a weight loss program  · fertility problems  · heart disease  · pituitary gland problem  · take medicines that treat or prevent blood clots  · an unusual or allergic reaction to levothyroxine, thyroid hormones, glycerin, glycerol, other medicines, foods, dyes, or preservatives  · pregnant or trying to get pregnant  · breast-feeding  How should I use this medicine?  Take this medicine by mouth with a glass of water. It is best to take on an empty stomach, at least 30 minutes to one hour before breakfast. Avoid taking antacids containing aluminum or magnesium, simethicone, bile acid sequestrants, calcium carbonate, sodium polystyrene sulfonate, ferrous sulfate, sevelamer, lanthanum, or sucralfate within 4 hours of taking this medicine. Do not cut, crush or chew this medicine. Follow the directions on the prescription label. Take at the same time each day. Do not take your medicine more often than directed.  Talk to your pediatrician regarding the use of this medicine in children. While this drug may be prescribed for selected conditions, precautions do apply. Since the capsules cannot be crushed or placed in water, they may only be given to infants and children who are able to swallow an intact capsule.  Overdosage: If you think you have taken too much of this medicine contact a poison control center or emergency room at once.  NOTE: This medicine is only for you. Do not share this medicine with others.  What if I miss a dose?  If you miss a dose, take it as soon as you can. If it is almost time for your next dose, take only that dose. Do not take double or extra doses.  What may interact with this medicine?  · amiodarone  · antacids  · anti-thyroid medicines  · calcium supplements  · carbamazepine  · certain medicines  for depression  · certain medicines to treat cancer  · cholestyramine  · clofibrate  · colesevelam  · colestipol  · digoxin  · female hormones, like estrogens and birth control pills, patches, rings, or injections  · iron supplements  · glyoxylate  · ketamine  · lanthanum  · liquid nutrition products like Ensure  · lithium  · medicines for colds and breathing difficulties  · medicines for diabetes  · medicines or dietary supplements for weight loss  · methadone  · niacin  · orlistat  · oxandrolone  · phenobarbital or other barbiturates  · phenytoin  · rifampin  · sevelamer  · simethicone  · sodium polystyrene sulfonate  · soy isoflavones  · steroid medicines like prednisone or cortisone  · sucralfate  · testosterone  · theophylline  · warfarin  This list may not describe all possible interactions. Give your health care provider a list of all the medicines, herbs, non-prescription drugs, or dietary supplements you use. Also tell them if you smoke, drink alcohol, or use illegal drugs. Some items may interact with your medicine.  What should I watch for while using this medicine?  Do not switch brands of this medicine unless your health care professional agrees with the change. Ask questions if you are uncertain.  You will need regular exams and occasional blood tests to check the response to treatment. If you are receiving this medicine for an underactive thyroid, it may be several weeks before you notice an improvement. Check with your doctor or health care professional if your symptoms do not improve.  It may be necessary for you to take this medicine for the rest of your life. Do not stop using this medicine unless your doctor or health care professional advises you to.  This medicine can affect blood sugar levels. If you have diabetes, check your blood sugar as directed.  You may lose some of your hair when you first start treatment. With time, this usually corrects itself.  If you are going to have surgery, tell  your doctor or health care professional that you are taking this medicine.  What side effects may I notice from receiving this medicine?  Side effects that you should report to your doctor or health care professional as soon as possible:  · allergic reactions like skin rash, itching or hives, swelling of the face, lips, or tongue  · anxious  · breathing problems  · changes in menstrual periods  · chest pain  · diarrhea  · excessive sweating or intolerance to heat  · fast or irregular heartbeat  · leg cramps  · nervousness  · swelling of ankles, feet, or legs  · tremors  · trouble sleeping  · vomiting  Side effects that usually do not require medical attention (report to your doctor or health care professional if they continue or are bothersome):  · changes in appetite  · headache  · irritable  · nausea  · weight loss  This list may not describe all possible side effects. Call your doctor for medical advice about side effects. You may report side effects to FDA at 0-245-FDA-1279.  Where should I keep my medicine?  Keep out of the reach of children.  Store at room temperature between 15 and 30 degrees C (59 and 86 degrees F). Protect from light and moisture. Keep container tightly closed. Throw away any unused medicine after the expiration date.  NOTE: This sheet is a summary. It may not cover all possible information. If you have questions about this medicine, talk to your doctor, pharmacist, or health care provider.  © 2021 Elsevier/Gold Standard (2020-12-10 13:23:17)

## 2021-04-19 PROBLEM — N92.6 ABNORMAL MENSTRUAL PERIODS: Status: ACTIVE | Noted: 2021-04-19

## 2021-04-19 PROBLEM — L40.9 PSORIASIS: Status: ACTIVE | Noted: 2021-04-19

## 2021-05-07 ENCOUNTER — OFFICE VISIT (OUTPATIENT)
Dept: INTERNAL MEDICINE | Facility: CLINIC | Age: 26
End: 2021-05-07

## 2021-05-07 VITALS
HEART RATE: 92 BPM | SYSTOLIC BLOOD PRESSURE: 158 MMHG | WEIGHT: 233 LBS | DIASTOLIC BLOOD PRESSURE: 84 MMHG | HEIGHT: 67 IN | TEMPERATURE: 97.7 F | BODY MASS INDEX: 36.57 KG/M2

## 2021-05-07 DIAGNOSIS — R10.9 LEFT SIDED ABDOMINAL PAIN: Primary | ICD-10-CM

## 2021-05-07 DIAGNOSIS — N92.6 ABNORMAL MENSTRUAL PERIODS: ICD-10-CM

## 2021-05-07 PROCEDURE — 99214 OFFICE O/P EST MOD 30 MIN: CPT | Performed by: NURSE PRACTITIONER

## 2021-05-07 RX ORDER — HYDROCHLOROTHIAZIDE 12.5 MG/1
12.5 TABLET ORAL DAILY
Qty: 90 TABLET | Refills: 0 | Status: SHIPPED | OUTPATIENT
Start: 2021-05-07 | End: 2021-07-19

## 2021-05-07 NOTE — PATIENT INSTRUCTIONS
Refer to gyn for left sided abdominal pain.  Consider pcos evaluation.    Goal bp 120/70.  If higher than 130/80 consistently increase hctz to 25mg daily.     Check BP at home and keep a log for your next visit.    •In general, adults should engage in aerobic physical activity 3-4 times a week with each session lasting an average of 40 minutes.  Goal for 150 minutes per week total.  •Moderate (brisk walking or jogging) to vigorous (running or biking) physical activity is recommended.  •There are many helpful strategies for heart-healthy eating, including the DASH diet and the [employer]'s Choose My Plate.   •Patients with high blood pressure should consume no more than 2,400 mg of sodium a day, ideally reducing sodium intake to 1,500 mg a day. However, even reducing sodium intake in one's current diet by 1,000 mg each day can help lower blood pressure.    Limit alcohol consumption.    Information obtained from the American College of Cardiology and American Heart Association.

## 2021-05-07 NOTE — PROGRESS NOTES
Isabell Oakes  1995  [id number]  Patient Care Team:  Yamilex Khan APRN as PCP - General (Internal Medicine)    Isabell Oakes is a pleasant 25 y.o. female who presents for evaluation of Hypertension (3 week follow up )      Chief Complaint   Patient presents with   • Hypertension     3 week follow up        HPI:   3 week f/u for high bp and hypothyroidism.  Started both hctz 12.5mg and levo 50mcg last visit. Home readings down to avg 135/85-90 since starting hctz.  TSH was 4.84 on 3/29/21.   Left sided abd pain several days a week for about 6 mo, upper and lower, not related to eating.  No constipation or diarrhea.    No reg periods on oral jacinda    History reviewed. No pertinent past medical history.  Past Surgical History:   Procedure Laterality Date   • TONSILLECTOMY  2001     Family History   Problem Relation Age of Onset   • Hypertension Mother    • Bipolar disorder Father    • Arthritis Maternal Grandmother    • Depression Maternal Grandmother    • Hypertension Maternal Grandmother    • Hyperlipidemia Maternal Grandmother    • Cancer Maternal Grandfather         melanoma?   • Arthritis Paternal Grandmother    • Depression Paternal Grandmother    • Hypertension Paternal Grandmother    • Cancer Paternal Grandfather         lung (smoker)   • Heart disease Paternal Grandfather      Social History     Tobacco Use   Smoking Status Never Smoker   Smokeless Tobacco Never Used     No Known Allergies    Current Outpatient Medications:   •  escitalopram (LEXAPRO) 20 MG tablet, Take 1 tablet by mouth Every Morning., Disp: , Rfl:   •  hydroCHLOROthiazide (HYDRODIURIL) 12.5 MG tablet, Take 1 tablet by mouth Daily., Disp: 90 tablet, Rfl: 0  •  lamoTRIgine (LaMICtal) 200 MG tablet, Take 1 tablet by mouth Every Morning., Disp: , Rfl:   •  levothyroxine (Synthroid) 50 MCG tablet, Take 1 tablet by mouth Daily., Disp: 90 tablet, Rfl: 0  •  Norethindrone Acet-Ethinyl Est (Tessa 1.5/30) 1.5-30 MG-MCG tablet, Take 1  "tablet by mouth Daily., Disp: , Rfl:     Review of Systems  /84 (BP Location: Left arm, Patient Position: Sitting, Cuff Size: Adult)   Pulse 92   Temp 97.7 °F (36.5 °C) (Temporal)   Ht 170 cm (66.93\")   Wt 106 kg (233 lb)   BMI 36.57 kg/m²     Physical Exam  Vitals reviewed.   Constitutional:       Appearance: Normal appearance. She is well-developed.   HENT:      Head: Normocephalic.      Comments: *wearing mask     Right Ear: External ear normal.      Left Ear: External ear normal.   Eyes:      Conjunctiva/sclera: Conjunctivae normal.      Pupils: Pupils are equal, round, and reactive to light.   Cardiovascular:      Rate and Rhythm: Normal rate and regular rhythm.      Pulses: Normal pulses.      Heart sounds: Normal heart sounds.   Pulmonary:      Effort: Pulmonary effort is normal.      Breath sounds: Normal breath sounds.   Abdominal:      General: Abdomen is flat. Bowel sounds are normal.      Palpations: Abdomen is soft.      Tenderness: There is abdominal tenderness in the left upper quadrant and left lower quadrant.   Musculoskeletal:         General: Normal range of motion.      Cervical back: Normal range of motion and neck supple.   Skin:     General: Skin is warm and dry.   Neurological:      Mental Status: She is alert and oriented to person, place, and time.   Psychiatric:         Mood and Affect: Mood normal.         Behavior: Behavior normal.         Thought Content: Thought content normal.         Judgment: Judgment normal.         Procedures    Results Review:  None    PHQ-9 Total Score:      Assessment/Plan:  Diagnoses and all orders for this visit:    1. Left sided abdominal pain (Primary)  -     Ambulatory Referral to Gynecology    2. Abnormal menstrual periods    Other orders  -     hydroCHLOROthiazide (HYDRODIURIL) 12.5 MG tablet; Take 1 tablet by mouth Daily.  Dispense: 90 tablet; Refill: 0       Patient Instructions   Refer to gyn for left sided abdominal pain.  Consider pcos " evaluation.    Goal bp 120/70.  If higher than 130/80 consistently increase hctz to 25mg daily.     Check BP at home and keep a log for your next visit.    •In general, adults should engage in aerobic physical activity 3-4 times a week with each session lasting an average of 40 minutes.  Goal for 150 minutes per week total.  •Moderate (brisk walking or jogging) to vigorous (running or biking) physical activity is recommended.  •There are many helpful strategies for heart-healthy eating, including the DASH diet and the [employer]'s Choose My Plate.   •Patients with high blood pressure should consume no more than 2,400 mg of sodium a day, ideally reducing sodium intake to 1,500 mg a day. However, even reducing sodium intake in one's current diet by 1,000 mg each day can help lower blood pressure.    Limit alcohol consumption.    Information obtained from the American College of Cardiology and American Heart Association.      Plan of care reviewed with patient at the conclusion of today's visit. Education was provided regarding diagnosis, management and any prescribed or recommended OTC medications.  Patient verbalizes understanding of and agreement with management plan.    Return in about 6 weeks (around 6/18/2021) for Recheck.    *Note that portions of this note were completed with a voice recognition program.  Efforts were made to edit the dictation but occasionally words are transcribed.    VIVIANE Rowan          Answers for HPI/ROS submitted by the patient on 5/7/2021  What is the primary reason for your visit?: High Blood Pressure

## 2021-06-18 ENCOUNTER — LAB (OUTPATIENT)
Dept: LAB | Facility: HOSPITAL | Age: 26
End: 2021-06-18

## 2021-06-18 ENCOUNTER — OFFICE VISIT (OUTPATIENT)
Dept: INTERNAL MEDICINE | Facility: CLINIC | Age: 26
End: 2021-06-18

## 2021-06-18 VITALS
HEIGHT: 67 IN | DIASTOLIC BLOOD PRESSURE: 72 MMHG | BODY MASS INDEX: 36.1 KG/M2 | HEART RATE: 92 BPM | TEMPERATURE: 97.1 F | SYSTOLIC BLOOD PRESSURE: 118 MMHG | WEIGHT: 230 LBS

## 2021-06-18 DIAGNOSIS — E55.9 VITAMIN D DEFICIENCY: ICD-10-CM

## 2021-06-18 DIAGNOSIS — R73.01 IMPAIRED FASTING GLUCOSE: ICD-10-CM

## 2021-06-18 DIAGNOSIS — I10 BENIGN ESSENTIAL HYPERTENSION: Primary | ICD-10-CM

## 2021-06-18 DIAGNOSIS — E03.9 HYPOTHYROIDISM, UNSPECIFIED TYPE: ICD-10-CM

## 2021-06-18 LAB
ALBUMIN SERPL-MCNC: 4.8 G/DL (ref 3.5–5.2)
ALBUMIN/GLOB SERPL: 1.5 G/DL
ALP SERPL-CCNC: 105 U/L (ref 39–117)
ALT SERPL W P-5'-P-CCNC: 62 U/L (ref 1–33)
ANION GAP SERPL CALCULATED.3IONS-SCNC: 11.8 MMOL/L (ref 5–15)
AST SERPL-CCNC: 51 U/L (ref 1–32)
BILIRUB SERPL-MCNC: 0.3 MG/DL (ref 0–1.2)
BUN SERPL-MCNC: 13 MG/DL (ref 6–20)
BUN/CREAT SERPL: 19.7 (ref 7–25)
CALCIUM SPEC-SCNC: 9.7 MG/DL (ref 8.6–10.5)
CHLORIDE SERPL-SCNC: 101 MMOL/L (ref 98–107)
CO2 SERPL-SCNC: 23.2 MMOL/L (ref 22–29)
CREAT SERPL-MCNC: 0.66 MG/DL (ref 0.57–1)
GFR SERPL CREATININE-BSD FRML MDRD: 110 ML/MIN/1.73
GLOBULIN UR ELPH-MCNC: 3.1 GM/DL
GLUCOSE SERPL-MCNC: 114 MG/DL (ref 65–99)
POTASSIUM SERPL-SCNC: 4 MMOL/L (ref 3.5–5.2)
PROT SERPL-MCNC: 7.9 G/DL (ref 6–8.5)
SODIUM SERPL-SCNC: 136 MMOL/L (ref 136–145)

## 2021-06-18 PROCEDURE — 85025 COMPLETE CBC W/AUTO DIFF WBC: CPT

## 2021-06-18 PROCEDURE — 84439 ASSAY OF FREE THYROXINE: CPT

## 2021-06-18 PROCEDURE — 99214 OFFICE O/P EST MOD 30 MIN: CPT | Performed by: NURSE PRACTITIONER

## 2021-06-18 PROCEDURE — 82306 VITAMIN D 25 HYDROXY: CPT

## 2021-06-18 PROCEDURE — 83036 HEMOGLOBIN GLYCOSYLATED A1C: CPT

## 2021-06-18 PROCEDURE — 84443 ASSAY THYROID STIM HORMONE: CPT

## 2021-06-18 PROCEDURE — 80053 COMPREHEN METABOLIC PANEL: CPT

## 2021-06-18 NOTE — PROGRESS NOTES
Isabell Oakes  1995  [id number]  Patient Care Team:  Yamilex Khan APRN as PCP - General (Internal Medicine)    Isabell Oakes is a pleasant 25 y.o. female who presents for evaluation of Abdominal Pain (6 week follow up ) and Hypertension    Chief Complaint   Patient presents with   • Abdominal Pain     6 week follow up    • Hypertension       HPI:   Juanjo is feeling well.  Taking the hydrocholorothiazide 12.5mg.  She did go up to the 25mg dose one day after last visit but had some weakness and lightheadedness so she went back to the 12.5mg dose.  Checking blood pressure at home less frequently.  Yesterday it was 135/86.  She is not exercising currently.  We started Levothyroxine 50mg 4/16/21 and says she has more energy.  Never had constipation or skin issues.      History reviewed. No pertinent past medical history.  Past Surgical History:   Procedure Laterality Date   • TONSILLECTOMY  2001     Family History   Problem Relation Age of Onset   • Hypertension Mother    • Bipolar disorder Father    • Arthritis Maternal Grandmother    • Depression Maternal Grandmother    • Hypertension Maternal Grandmother    • Hyperlipidemia Maternal Grandmother    • Cancer Maternal Grandfather         melanoma?   • Arthritis Paternal Grandmother    • Depression Paternal Grandmother    • Hypertension Paternal Grandmother    • Cancer Paternal Grandfather         lung (smoker)   • Heart disease Paternal Grandfather      Social History     Tobacco Use   Smoking Status Never Smoker   Smokeless Tobacco Never Used     No Known Allergies    Current Outpatient Medications:   •  escitalopram (LEXAPRO) 20 MG tablet, Take 1 tablet by mouth Every Morning., Disp: , Rfl:   •  hydroCHLOROthiazide (HYDRODIURIL) 12.5 MG tablet, Take 1 tablet by mouth Daily., Disp: 90 tablet, Rfl: 0  •  lamoTRIgine (LaMICtal) 200 MG tablet, Take 1 tablet by mouth Every Morning., Disp: , Rfl:   •  levothyroxine (Synthroid) 50 MCG tablet, Take 1 tablet  "by mouth Daily., Disp: 90 tablet, Rfl: 0  •  Norethindrone Acet-Ethinyl Est (Tessa 1.5/30) 1.5-30 MG-MCG tablet, Take 1 tablet by mouth Daily., Disp: , Rfl:     Review of Systems  /72 (BP Location: Left arm, Patient Position: Sitting, Cuff Size: Large Adult)   Pulse 92   Temp 97.1 °F (36.2 °C) (Temporal)   Ht 170 cm (66.93\")   Wt 104 kg (230 lb)   BMI 36.10 kg/m²     Physical Exam  Constitutional:       Appearance: She is well-developed.   HENT:      Head: Normocephalic and atraumatic.      Comments: *wearing mask  Eyes:      Conjunctiva/sclera: Conjunctivae normal.      Pupils: Pupils are equal, round, and reactive to light.   Cardiovascular:      Rate and Rhythm: Normal rate and regular rhythm.      Pulses: Normal pulses.      Heart sounds: Normal heart sounds.   Pulmonary:      Effort: Pulmonary effort is normal.      Breath sounds: Normal breath sounds.   Musculoskeletal:         General: Normal range of motion.      Cervical back: Normal range of motion and neck supple.   Skin:     General: Skin is warm and dry.   Neurological:      Mental Status: She is alert and oriented to person, place, and time.   Psychiatric:         Mood and Affect: Mood normal.         Behavior: Behavior normal.         Thought Content: Thought content normal.         Judgment: Judgment normal.         Procedures    Results Review:  None    PHQ-9 Total Score:      Assessment/Plan:  Diagnoses and all orders for this visit:    1. Benign essential hypertension (Primary)  Comments:  Check BP at home, goal 130/70. If higher need to adjust medications. Email me in 2 weeks with your readings for follow-up, continue HCTZ 12.5 mg for now, low-so    2. Vitamin D deficiency  Comments:  Check with labs  Orders:  -     Vitamin D 25 Hydroxy; Future    3. Hypothyroidism, unspecified type  Comments:  Recheck TSH today  Orders:  -     Comprehensive Metabolic Panel; Future  -     TSH Rfx On Abnormal To Free T4; Future    4. Impaired fasting " glucose  -     CBC & Differential; Future  -     Hemoglobin A1c; Future       Patient Instructions   Goal /70. Check at home for the next 1 to 2 weeks and email me with your readings. You may need an adjustment in your medication. For now, continue medications as prescribed.  Check BP at home and keep a log for your next visit.    •In general, adults should engage in aerobic physical activity 3-4 times a week with each session lasting an average of 40 minutes.  Goal for 150 minutes per week total.  •Moderate (brisk walking or jogging) to vigorous (running or biking) physical activity is recommended.  •There are many helpful strategies for heart-healthy eating, including the DASH diet and the [employer]'s Choose My Plate.   •Patients with high blood pressure should consume no more than 2,400 mg of sodium a day, ideally reducing sodium intake to 1,500 mg a day. However, even reducing sodium intake in one's current diet by 1,000 mg each day can help lower blood pressure.    Limit alcohol consumption.    Information obtained from the American College of Cardiology and American Heart Association.      Recheck TSH, continue levothyroxine    Plan of care reviewed with patient at the conclusion of today's visit. Education was provided regarding diagnosis, management and any prescribed or recommended OTC medications.  Patient verbalizes understanding of and agreement with management plan.    Return in about 3 months (around 9/18/2021) for Labs this visit.    *Note that portions of this note were completed with a voice recognition program.  Efforts were made to edit the dictation but occasionally words are transcribed.    VIVIANE Rowan          Answers for HPI/ROS submitted by the patient on 6/18/2021  What is the primary reason for your visit?: High Blood Pressure

## 2021-06-19 LAB
25(OH)D3 SERPL-MCNC: 39.5 NG/ML (ref 30–100)
BASOPHILS # BLD AUTO: 0.06 10*3/MM3 (ref 0–0.2)
BASOPHILS NFR BLD AUTO: 0.6 % (ref 0–1.5)
DEPRECATED RDW RBC AUTO: 40.4 FL (ref 37–54)
EOSINOPHIL # BLD AUTO: 0.16 10*3/MM3 (ref 0–0.4)
EOSINOPHIL NFR BLD AUTO: 1.6 % (ref 0.3–6.2)
ERYTHROCYTE [DISTWIDTH] IN BLOOD BY AUTOMATED COUNT: 13 % (ref 12.3–15.4)
HBA1C MFR BLD: 6.3 % (ref 4.8–5.6)
HCT VFR BLD AUTO: 40.8 % (ref 34–46.6)
HGB BLD-MCNC: 13.5 G/DL (ref 12–15.9)
IMM GRANULOCYTES # BLD AUTO: 0.04 10*3/MM3 (ref 0–0.05)
IMM GRANULOCYTES NFR BLD AUTO: 0.4 % (ref 0–0.5)
LYMPHOCYTES # BLD AUTO: 2.63 10*3/MM3 (ref 0.7–3.1)
LYMPHOCYTES NFR BLD AUTO: 26.5 % (ref 19.6–45.3)
MCH RBC QN AUTO: 28.5 PG (ref 26.6–33)
MCHC RBC AUTO-ENTMCNC: 33.1 G/DL (ref 31.5–35.7)
MCV RBC AUTO: 86.3 FL (ref 79–97)
MONOCYTES # BLD AUTO: 0.51 10*3/MM3 (ref 0.1–0.9)
MONOCYTES NFR BLD AUTO: 5.1 % (ref 5–12)
NEUTROPHILS NFR BLD AUTO: 6.52 10*3/MM3 (ref 1.7–7)
NEUTROPHILS NFR BLD AUTO: 65.8 % (ref 42.7–76)
NRBC BLD AUTO-RTO: 0 /100 WBC (ref 0–0.2)
PLATELET # BLD AUTO: 348 10*3/MM3 (ref 140–450)
PMV BLD AUTO: 10.9 FL (ref 6–12)
RBC # BLD AUTO: 4.73 10*6/MM3 (ref 3.77–5.28)
T4 FREE SERPL-MCNC: 1.2 NG/DL (ref 0.93–1.7)
TSH SERPL DL<=0.05 MIU/L-ACNC: 4.95 UIU/ML (ref 0.27–4.2)
WBC # BLD AUTO: 9.92 10*3/MM3 (ref 3.4–10.8)

## 2021-06-21 PROBLEM — R79.89 ELEVATED LIVER FUNCTION TESTS: Status: ACTIVE | Noted: 2021-06-21

## 2021-06-21 PROBLEM — I10 BENIGN ESSENTIAL HYPERTENSION: Status: ACTIVE | Noted: 2021-06-21

## 2021-06-21 PROBLEM — R73.03 PREDIABETES: Status: ACTIVE | Noted: 2021-06-21

## 2021-06-21 RX ORDER — LEVOTHYROXINE SODIUM 0.07 MG/1
75 TABLET ORAL DAILY
Qty: 90 TABLET | Refills: 0 | Status: SHIPPED | OUTPATIENT
Start: 2021-06-21 | End: 2021-09-17

## 2021-06-21 NOTE — PATIENT INSTRUCTIONS
Goal /70. Check at home for the next 1 to 2 weeks and email me with your readings. You may need an adjustment in your medication. For now, continue medications as prescribed.  Check BP at home and keep a log for your next visit.    •In general, adults should engage in aerobic physical activity 3-4 times a week with each session lasting an average of 40 minutes.  Goal for 150 minutes per week total.  •Moderate (brisk walking or jogging) to vigorous (running or biking) physical activity is recommended.  •There are many helpful strategies for heart-healthy eating, including the DASH diet and the [employer]'s Choose My Plate.   •Patients with high blood pressure should consume no more than 2,400 mg of sodium a day, ideally reducing sodium intake to 1,500 mg a day. However, even reducing sodium intake in one's current diet by 1,000 mg each day can help lower blood pressure.    Limit alcohol consumption.    Information obtained from the American College of Cardiology and American Heart Association.      Recheck TSH, continue levothyroxine

## 2021-06-28 ENCOUNTER — PATIENT MESSAGE (OUTPATIENT)
Dept: INTERNAL MEDICINE | Facility: CLINIC | Age: 26
End: 2021-06-28

## 2021-06-28 RX ORDER — NORETHINDRONE ACETATE AND ETHINYL ESTRADIOL .03; 1.5 MG/1; MG/1
1 TABLET ORAL DAILY
Qty: 1 EACH | Refills: 5 | Status: SHIPPED | OUTPATIENT
Start: 2021-06-28 | End: 2021-12-14

## 2021-06-28 NOTE — TELEPHONE ENCOUNTER
From: Isabell Oakes  To: VIVIANE Rowan  Sent: 6/28/2021 1:12 PM EDT  Subject: Prescription Question    Chicho Kaminski!    I am out of refills on my birth control, Tessa & my previous doctor was the one writing the prescription. Is it possible for you to call that into my pharmacy?    Thanks!

## 2021-07-19 ENCOUNTER — PATIENT MESSAGE (OUTPATIENT)
Dept: INTERNAL MEDICINE | Facility: CLINIC | Age: 26
End: 2021-07-19

## 2021-07-19 RX ORDER — HYDROCHLOROTHIAZIDE 12.5 MG/1
TABLET ORAL
Qty: 90 TABLET | Refills: 1 | Status: SHIPPED | OUTPATIENT
Start: 2021-07-19 | End: 2021-07-20 | Stop reason: SDUPTHER

## 2021-07-20 RX ORDER — HYDROCHLOROTHIAZIDE 25 MG/1
25 TABLET ORAL DAILY
Qty: 90 TABLET | Refills: 1 | Status: SHIPPED | OUTPATIENT
Start: 2021-07-20 | End: 2022-01-17 | Stop reason: SDUPTHER

## 2021-09-09 ENCOUNTER — TELEMEDICINE (OUTPATIENT)
Dept: INTERNAL MEDICINE | Facility: CLINIC | Age: 26
End: 2021-09-09

## 2021-09-09 DIAGNOSIS — R73.03 PREDIABETES: ICD-10-CM

## 2021-09-09 DIAGNOSIS — E66.01 MORBID OBESITY (HCC): ICD-10-CM

## 2021-09-09 DIAGNOSIS — J01.00 ACUTE NON-RECURRENT MAXILLARY SINUSITIS: Primary | ICD-10-CM

## 2021-09-09 DIAGNOSIS — R05.9 COUGH: ICD-10-CM

## 2021-09-09 DIAGNOSIS — I10 BENIGN ESSENTIAL HYPERTENSION: ICD-10-CM

## 2021-09-09 PROCEDURE — 99213 OFFICE O/P EST LOW 20 MIN: CPT | Performed by: PHYSICIAN ASSISTANT

## 2021-09-09 RX ORDER — PROMETHAZINE HYDROCHLORIDE AND CODEINE PHOSPHATE 6.25; 1 MG/5ML; MG/5ML
5 SOLUTION ORAL NIGHTLY
Qty: 120 ML | Refills: 0 | Status: SHIPPED | OUTPATIENT
Start: 2021-09-09 | End: 2021-10-04

## 2021-09-09 RX ORDER — AZITHROMYCIN 250 MG/1
TABLET, FILM COATED ORAL
Qty: 6 TABLET | Refills: 0 | Status: SHIPPED | OUTPATIENT
Start: 2021-09-09 | End: 2021-10-04

## 2021-09-09 RX ORDER — FLUTICASONE PROPIONATE 50 MCG
2 SPRAY, SUSPENSION (ML) NASAL DAILY
Qty: 9.9 ML | Refills: 1 | Status: SHIPPED | OUTPATIENT
Start: 2021-09-09 | End: 2021-10-04

## 2021-09-09 NOTE — PROGRESS NOTES
"Patient Care Team:  Yamilex Khan APRN as PCP - General (Internal Medicine)    Chief Complaint::   Chief Complaint   Patient presents with   • Cough   • Headache   • URI      You have chosen to receive care through a video visit. Do you consent to use a video  visit for your medical care today? Yes  Subjective     HPI  Juanjo is a 25 year old female with history of hypertension, obesity, and prediabetes, who presents for evaluation of cough, sinus congestion, and headache.  Symptoms started 9 days ago. COVID test was negative.  Currently is a teacher at [employer].  She has had both Covid vaccinations and they were completed in January/February. She did get another COVID test last night, results pending.  She has taken mucinex maximum strength for sinus, delselym, nyquil, advil cough drops, nose sprays, without any improvement.. She had body aches last week and feels \"hot\" this week. Prone to seasonal sinus infection.  She denies chest pain, shortness of breath, fever, or loss of taste or smell.  She is compliant with her medications, but reports that she has not been checking her blood pressures at home.        The following portions of the patient's history were reviewed and updated as appropriate: active problem list, medication list, allergies, family history, social history    Review of Systems:   Review of Systems   Constitutional: Positive for activity change and fatigue. Negative for appetite change, diaphoresis, fever, unexpected weight gain and unexpected weight loss.   HENT: Positive for congestion and sinus pressure. Negative for hearing loss and sore throat.    Eyes: Negative for visual disturbance.   Respiratory: Positive for cough. Negative for chest tightness and shortness of breath.    Cardiovascular: Negative for chest pain, palpitations and leg swelling.   Gastrointestinal: Negative for abdominal pain, blood in stool, GERD and indigestion.   Endocrine: Positive for heat " intolerance. Negative for cold intolerance.   Genitourinary: Negative for dysuria and hematuria.   Musculoskeletal: Negative for arthralgias and myalgias.   Skin: Negative for skin lesions.   Allergic/Immunologic: Positive for environmental allergies.   Neurological: Positive for headache. Negative for dizziness, tremors, seizures, syncope, speech difficulty, weakness, memory problem and confusion.   Hematological: Does not bruise/bleed easily.   Psychiatric/Behavioral: Negative for sleep disturbance and depressed mood. The patient is not nervous/anxious.        Vital Signs  There were no vitals filed for this visit.  There is no height or weight on file to calculate BMI.    Labs  Lab on 06/18/2021   Component Date Value Ref Range Status   • Glucose 06/18/2021 114* 65 - 99 mg/dL Final   • BUN 06/18/2021 13  6 - 20 mg/dL Final   • Creatinine 06/18/2021 0.66  0.57 - 1.00 mg/dL Final   • Sodium 06/18/2021 136  136 - 145 mmol/L Final   • Potassium 06/18/2021 4.0  3.5 - 5.2 mmol/L Final   • Chloride 06/18/2021 101  98 - 107 mmol/L Final   • CO2 06/18/2021 23.2  22.0 - 29.0 mmol/L Final   • Calcium 06/18/2021 9.7  8.6 - 10.5 mg/dL Final   • Total Protein 06/18/2021 7.9  6.0 - 8.5 g/dL Final   • Albumin 06/18/2021 4.80  3.50 - 5.20 g/dL Final   • ALT (SGPT) 06/18/2021 62* 1 - 33 U/L Final   • AST (SGOT) 06/18/2021 51* 1 - 32 U/L Final   • Alkaline Phosphatase 06/18/2021 105  39 - 117 U/L Final   • Total Bilirubin 06/18/2021 0.3  0.0 - 1.2 mg/dL Final   • eGFR Non African Amer 06/18/2021 110  >60 mL/min/1.73 Final   • Globulin 06/18/2021 3.1  gm/dL Final   • A/G Ratio 06/18/2021 1.5  g/dL Final   • BUN/Creatinine Ratio 06/18/2021 19.7  7.0 - 25.0 Final   • Anion Gap 06/18/2021 11.8  5.0 - 15.0 mmol/L Final   • TSH 06/18/2021 4.950* 0.270 - 4.200 uIU/mL Final   • 25 Hydroxy, Vitamin D 06/18/2021 39.5  30.0 - 100.0 ng/ml Final   • Hemoglobin A1C 06/18/2021 6.30* 4.80 - 5.60 % Final   • WBC 06/18/2021 9.92  3.40 - 10.80  10*3/mm3 Final   • RBC 06/18/2021 4.73  3.77 - 5.28 10*6/mm3 Final   • Hemoglobin 06/18/2021 13.5  12.0 - 15.9 g/dL Final   • Hematocrit 06/18/2021 40.8  34.0 - 46.6 % Final   • MCV 06/18/2021 86.3  79.0 - 97.0 fL Final   • MCH 06/18/2021 28.5  26.6 - 33.0 pg Final   • MCHC 06/18/2021 33.1  31.5 - 35.7 g/dL Final   • RDW 06/18/2021 13.0  12.3 - 15.4 % Final   • RDW-SD 06/18/2021 40.4  37.0 - 54.0 fl Final   • MPV 06/18/2021 10.9  6.0 - 12.0 fL Final   • Platelets 06/18/2021 348  140 - 450 10*3/mm3 Final   • Neutrophil % 06/18/2021 65.8  42.7 - 76.0 % Final   • Lymphocyte % 06/18/2021 26.5  19.6 - 45.3 % Final   • Monocyte % 06/18/2021 5.1  5.0 - 12.0 % Final   • Eosinophil % 06/18/2021 1.6  0.3 - 6.2 % Final   • Basophil % 06/18/2021 0.6  0.0 - 1.5 % Final   • Immature Grans % 06/18/2021 0.4  0.0 - 0.5 % Final   • Neutrophils, Absolute 06/18/2021 6.52  1.70 - 7.00 10*3/mm3 Final   • Lymphocytes, Absolute 06/18/2021 2.63  0.70 - 3.10 10*3/mm3 Final   • Monocytes, Absolute 06/18/2021 0.51  0.10 - 0.90 10*3/mm3 Final   • Eosinophils, Absolute 06/18/2021 0.16  0.00 - 0.40 10*3/mm3 Final   • Basophils, Absolute 06/18/2021 0.06  0.00 - 0.20 10*3/mm3 Final   • Immature Grans, Absolute 06/18/2021 0.04  0.00 - 0.05 10*3/mm3 Final   • nRBC 06/18/2021 0.0  0.0 - 0.2 /100 WBC Final   • Free T4 06/18/2021 1.20  0.93 - 1.70 ng/dL Final       Imaging  No radiology results for the last 30 days.      Current Outpatient Medications:   •  azithromycin (Zithromax Z-Jem) 250 MG tablet, Take 2 tablets the first day, then 1 tablet daily for 4 days., Disp: 6 tablet, Rfl: 0  •  escitalopram (LEXAPRO) 20 MG tablet, Take 1 tablet by mouth Every Morning., Disp: , Rfl:   •  fluticasone (Flonase) 50 MCG/ACT nasal spray, 2 sprays into the nostril(s) as directed by provider Daily., Disp: 9.9 mL, Rfl: 1  •  hydroCHLOROthiazide (HYDRODIURIL) 25 MG tablet, Take 1 tablet by mouth Daily., Disp: 90 tablet, Rfl: 1  •  lamoTRIgine (LaMICtal) 200 MG  tablet, Take 1 tablet by mouth Every Morning., Disp: , Rfl:   •  levothyroxine (Synthroid) 75 MCG tablet, Take 1 tablet by mouth Daily., Disp: 90 tablet, Rfl: 0  •  Norethindrone Acet-Ethinyl Est (Tessa 1.5/30) 1.5-30 MG-MCG tablet, Take 1 tablet by mouth Daily., Disp: 1 each, Rfl: 5  •  promethazine-codeine (PHENERGAN with CODEINE) 6.25-10 MG/5ML solution, Take 5 mL by mouth Every Night., Disp: 120 mL, Rfl: 0    Physical Exam:    Physical Exam  Vitals reviewed.   Constitutional:       Appearance: Normal appearance.   HENT:      Head: Normocephalic and atraumatic.      Nose: Nose normal.   Pulmonary:      Effort: No respiratory distress.   Musculoskeletal:      Cervical back: Normal range of motion.   Skin:     Coloration: Skin is not jaundiced or pale.   Neurological:      General: No focal deficit present.      Mental Status: She is alert. Mental status is at baseline.   Psychiatric:         Mood and Affect: Mood normal.         Behavior: Behavior normal.         Thought Content: Thought content normal.         Judgment: Judgment normal.         Procedures        Assessment/Plan   Problem List Items Addressed This Visit        Cardiac and Vasculature    Benign essential hypertension    Overview     Continue hydrochlorothiazide 25 mg tablets daily.  Stop pseudoephedrine.  Check blood pressures at home.  Try and drink more water.         Relevant Medications    hydroCHLOROthiazide (HYDRODIURIL) 25 MG tablet       ENT    Acute non-recurrent maxillary sinusitis - Primary    Overview     Continue fluticasone nasal spray.  May use Mucinex.  Avoid pseudoephedrine due to hypertension.  Z-Jem prescribed.  Continue to drink plenty of water.         Relevant Medications    azithromycin (Zithromax Z-Ejm) 250 MG tablet    promethazine-codeine (PHENERGAN with CODEINE) 6.25-10 MG/5ML solution       Endocrine and Metabolic    Morbid obesity (CMS/HCC)    Overview     Depression, htn         Prediabetes       Pulmonary and  Pneumonias    Cough    Relevant Medications    promethazine-codeine (PHENERGAN with CODEINE) 6.25-10 MG/5ML solution        This visit has been rescheduled as a video visit to comply with patient safety concerns in accordance with CDC recommendations. Total time of discussion was 20 minutes.    Return if symptoms worsen or fail to improve.    Plan of care reviewed with patient at the conclusion of today's visit. Education was provided regarding diagnosis, management, and any prescribed or recommended OTC medications.Patient verbalizes understanding of and agreement with management plan.     I spent 20 minutes caring for Isabell on this date of service. This time includes time spent by me in the following activities:reviewing tests, obtaining and/or reviewing a separately obtained history, counseling and educating the patient/family/caregiver, ordering medications, tests, or procedures and documenting information in the medical record    Yasmine Vargas PA-C    Please note that portions of this note were completed with a voice recognition program. Efforts were made to edit the dictations, but occasionally words are mistranscribed.

## 2021-09-17 RX ORDER — LEVOTHYROXINE SODIUM 0.07 MG/1
TABLET ORAL
Qty: 90 TABLET | Refills: 1 | Status: SHIPPED | OUTPATIENT
Start: 2021-09-17 | End: 2022-03-14

## 2021-10-04 ENCOUNTER — OFFICE VISIT (OUTPATIENT)
Dept: INTERNAL MEDICINE | Facility: CLINIC | Age: 26
End: 2021-10-04

## 2021-10-04 VITALS
HEART RATE: 100 BPM | HEIGHT: 67 IN | TEMPERATURE: 98 F | BODY MASS INDEX: 35.31 KG/M2 | WEIGHT: 225 LBS | SYSTOLIC BLOOD PRESSURE: 122 MMHG | DIASTOLIC BLOOD PRESSURE: 80 MMHG

## 2021-10-04 DIAGNOSIS — E03.9 HYPOTHYROIDISM, UNSPECIFIED TYPE: ICD-10-CM

## 2021-10-04 DIAGNOSIS — E66.01 MORBID OBESITY (HCC): ICD-10-CM

## 2021-10-04 DIAGNOSIS — R79.89 ELEVATED LIVER FUNCTION TESTS: ICD-10-CM

## 2021-10-04 DIAGNOSIS — E11.9 DIABETES MELLITUS, NEW ONSET (HCC): Primary | ICD-10-CM

## 2021-10-04 DIAGNOSIS — I10 BENIGN ESSENTIAL HYPERTENSION: ICD-10-CM

## 2021-10-04 PROBLEM — J01.00 ACUTE NON-RECURRENT MAXILLARY SINUSITIS: Status: RESOLVED | Noted: 2021-09-09 | Resolved: 2021-10-04

## 2021-10-04 LAB — HBA1C MFR BLD: 6.7 % (ref 4.8–5.6)

## 2021-10-04 PROCEDURE — 83036 HEMOGLOBIN GLYCOSYLATED A1C: CPT | Performed by: NURSE PRACTITIONER

## 2021-10-04 PROCEDURE — 99214 OFFICE O/P EST MOD 30 MIN: CPT | Performed by: NURSE PRACTITIONER

## 2021-10-04 RX ORDER — GUSELKUMAB 100 MG/ML
INJECTION SUBCUTANEOUS
COMMUNITY
Start: 2021-07-19

## 2021-10-04 NOTE — PATIENT INSTRUCTIONS
Diabetes Mellitus and Nutrition, Adult  When you have diabetes, or diabetes mellitus, it is very important to have healthy eating habits because your blood sugar (glucose) levels are greatly affected by what you eat and drink. Eating healthy foods in the right amounts, at about the same times every day, can help you:  · Control your blood glucose.  · Lower your risk of heart disease.  · Improve your blood pressure.  · Reach or maintain a healthy weight.  What can affect my meal plan?  Every person with diabetes is different, and each person has different needs for a meal plan. Your health care provider may recommend that you work with a dietitian to make a meal plan that is best for you. Your meal plan may vary depending on factors such as:  · The calories you need.  · The medicines you take.  · Your weight.  · Your blood glucose, blood pressure, and cholesterol levels.  · Your activity level.  · Other health conditions you have, such as heart or kidney disease.  How do carbohydrates affect me?  Carbohydrates, also called carbs, affect your blood glucose level more than any other type of food. Eating carbs naturally raises the amount of glucose in your blood. Carb counting is a method for keeping track of how many carbs you eat. Counting carbs is important to keep your blood glucose at a healthy level, especially if you use insulin or take certain oral diabetes medicines.  It is important to know how many carbs you can safely have in each meal. This is different for every person. Your dietitian can help you calculate how many carbs you should have at each meal and for each snack.  How does alcohol affect me?  Alcohol can cause a sudden decrease in blood glucose (hypoglycemia), especially if you use insulin or take certain oral diabetes medicines. Hypoglycemia can be a life-threatening condition. Symptoms of hypoglycemia, such as sleepiness, dizziness, and confusion, are similar to symptoms of having too much  "alcohol.  · Do not drink alcohol if:  ? Your health care provider tells you not to drink.  ? You are pregnant, may be pregnant, or are planning to become pregnant.  · If you drink alcohol:  ? Do not drink on an empty stomach.  ? Limit how much you use to:  § 0-1 drink a day for women.  § 0-2 drinks a day for men.  ? Be aware of how much alcohol is in your drink. In the [place] one drink equals one 12 oz bottle of beer (355 mL), one 5 oz glass of wine (148 mL), or one 1½ oz glass of hard liquor (44 mL).  ? Keep yourself hydrated with water, diet soda, or unsweetened iced tea.  § Keep in mind that regular soda, juice, and other mixers may contain a lot of sugar and must be counted as carbs.  What are tips for following this plan?    Reading food labels  · Start by checking the serving size on the \"Nutrition Facts\" label of packaged foods and drinks. The amount of calories, carbs, fats, and other nutrients listed on the label is based on one serving of the item. Many items contain more than one serving per package.  · Check the total grams (g) of carbs in one serving. You can calculate the number of servings of carbs in one serving by dividing the total carbs by 15. For example, if a food has 30 g of total carbs per serving, it would be equal to 2 servings of carbs.  · Check the number of grams (g) of saturated fats and trans fats in one serving. Choose foods that have a low amount or none of these fats.  · Check the number of milligrams (mg) of salt (sodium) in one serving. Most people should limit total sodium intake to less than 2,300 mg per day.  · Always check the nutrition information of foods labeled as \"low-fat\" or \"nonfat.\" These foods may be higher in added sugar or refined carbs and should be avoided.  · Talk to your dietitian to identify your daily goals for nutrients listed on the label.  Shopping  · Avoid buying canned, pre-made, or processed foods. These foods tend to be high in fat, sodium, and added " sugar.  · Shop around the outside edge of the grocery store. This is where you will most often find fresh fruits and vegetables, bulk grains, fresh meats, and fresh dairy.  Cooking  · Use low-heat cooking methods, such as baking, instead of high-heat cooking methods like deep frying.  · Cook using healthy oils, such as olive, canola, or sunflower oil.  · Avoid cooking with butter, cream, or high-fat meats.  Meal planning  · Eat meals and snacks regularly, preferably at the same times every day. Avoid going long periods of time without eating.  · Eat foods that are high in fiber, such as fresh fruits, vegetables, beans, and whole grains. Talk with your dietitian about how many servings of carbs you can eat at each meal.  · Eat 4-6 oz (112-168 g) of lean protein each day, such as lean meat, chicken, fish, eggs, or tofu. One ounce (oz) of lean protein is equal to:  ? 1 oz (28 g) of meat, chicken, or fish.  ? 1 egg.  ? ¼ cup (62 g) of tofu.  · Eat some foods each day that contain healthy fats, such as avocado, nuts, seeds, and fish.  What foods should I eat?  Fruits  Berries. Apples. Oranges. Peaches. Apricots. Plums. Grapes. [place]. Papaya. Pomegranate. Kiwi. Cherries.  Vegetables  Lettuce. Spinach. Leafy greens, including kale, chard, juno greens, and mustard greens. Beets. Cauliflower. Cabbage. Broccoli. Carrots. Green beans. Tomatoes. Peppers. Onions. Cucumbers. [place] sprouts.  Grains  Whole grains, such as whole-wheat or whole-grain bread, crackers, tortillas, cereal, and pasta. Unsweetened oatmeal. Quinoa. Brown or wild rice.  Meats and other proteins  Seafood. Poultry without skin. Lean cuts of poultry and beef. Tofu. Nuts. Seeds.  Dairy  Low-fat or fat-free dairy products such as milk, yogurt, and cheese.  The items listed above may not be a complete list of foods and beverages you can eat. Contact a dietitian for more information.  What foods should I avoid?  Fruits  Fruits canned with  syrup.  Vegetables  Canned vegetables. Frozen vegetables with butter or cream sauce.  Grains  Refined white flour and flour products such as bread, pasta, snack foods, and cereals. Avoid all processed foods.  Meats and other proteins  Fatty cuts of meat. Poultry with skin. Breaded or fried meats. Processed meat. Avoid saturated fats.  Dairy  Full-fat yogurt, cheese, or milk.  Beverages  Sweetened drinks, such as soda or iced tea.  The items listed above may not be a complete list of foods and beverages you should avoid. Contact a dietitian for more information.  Questions to ask a health care provider  · Do I need to meet with a diabetes educator?  · Do I need to meet with a dietitian?  · What number can I call if I have questions?  · When are the best times to check my blood glucose?  Where to find more information:  · American Diabetes Association: diabetes.org  · Academy of Nutrition and Dietetics: www.eatright.org  · National [place] of Diabetes and Digestive and Kidney Diseases: www.niddk.nih.gov  · Association of Diabetes Care and Education Specialists: www.diabeteseducator.org  Summary  · It is important to have healthy eating habits because your blood sugar (glucose) levels are greatly affected by what you eat and drink.  · A healthy meal plan will help you control your blood glucose and maintain a healthy lifestyle.  · Your health care provider may recommend that you work with a dietitian to make a meal plan that is best for you.  · Keep in mind that carbohydrates (carbs) and alcohol have immediate effects on your blood glucose levels. It is important to count carbs and to use alcohol carefully.  This information is not intended to replace advice given to you by your health care provider. Make sure you discuss any questions you have with your health care provider.  Document Revised: 11/24/2020 Document Reviewed: 11/24/2020  [employer] Patient Education © 2021 [employer]  Metformin tablets  What is this  medicine?  METFORMIN (met FOR min) is used to treat type 2 diabetes. It helps to control blood sugar. Treatment is combined with diet and exercise. This medicine can be used alone or with other medicines for diabetes.  This medicine may be used for other purposes; ask your health care provider or pharmacist if you have questions.  COMMON BRAND NAME(S): Glucophage  What should I tell my health care provider before I take this medicine?  They need to know if you have any of these conditions:  · anemia  · dehydration  · heart disease  · frequently drink alcohol-containing beverages  · kidney disease  · liver disease  · polycystic ovary syndrome  · serious infection or injury  · vomiting  · an unusual or allergic reaction to metformin, other medicines, foods, dyes, or preservatives  · pregnant or trying to get pregnant  · breast-feeding  How should I use this medicine?  Take this medicine by mouth with a glass of water. Follow the directions on the prescription label. Take this medicine with food. Take your medicine at regular intervals. Do not take your medicine more often than directed. Do not stop taking except on your doctor's advice.  Talk to your pediatrician regarding the use of this medicine in children. While this drug may be prescribed for children as young as 10 years of age for selected conditions, precautions do apply.  Overdosage: If you think you have taken too much of this medicine contact a poison control center or emergency room at once.  NOTE: This medicine is only for you. Do not share this medicine with others.  What if I miss a dose?  If you miss a dose, take it as soon as you can. If it is almost time for your next dose, take only that dose. Do not take double or extra doses.  What may interact with this medicine?  Do not take this medicine with any of the following medications:  · certain contrast medicines given before X-rays, CT scans, MRI, or other procedures  · dofetilide  This medicine may  also interact with the following medications:  · acetazolamide  · alcohol  · certain antivirals for HIV or hepatitis  · certain medicines for blood pressure, heart disease, irregular heart beat  · cimetidine  · dichlorphenamide  · digoxin  · diuretics  · female hormones, like estrogens or progestins and birth control pills  · glycopyrrolate  · isoniazid  · lamotrigine  · memantine  · methazolamide  · metoclopramide  · midodrine  · niacin  · phenothiazines like chlorpromazine, mesoridazine, prochlorperazine, thioridazine  · phenytoin  · ranolazine  · steroid medicines like prednisone or cortisone  · stimulant medicines for attention disorders, weight loss, or to stay awake  · thyroid medicines  · topiramate  · trospium  · vandetanib  · zonisamide  This list may not describe all possible interactions. Give your health care provider a list of all the medicines, herbs, non-prescription drugs, or dietary supplements you use. Also tell them if you smoke, drink alcohol, or use illegal drugs. Some items may interact with your medicine.  What should I watch for while using this medicine?  Visit your doctor or health care professional for regular checks on your progress.  A test called the HbA1C (A1C) will be monitored. This is a simple blood test. It measures your blood sugar control over the last 2 to 3 months. You will receive this test every 3 to 6 months.  Learn how to check your blood sugar. Learn the symptoms of low and high blood sugar and how to manage them.  Always carry a quick-source of sugar with you in case you have symptoms of low blood sugar. Examples include hard sugar candy or glucose tablets. Make sure others know that you can choke if you eat or drink when you develop serious symptoms of low blood sugar, such as seizures or unconsciousness. They must get medical help at once.  Tell your doctor or health care professional if you have high blood sugar. You might need to change the dose of your medicine. If  you are sick or exercising more than usual, you might need to change the dose of your medicine.  Do not skip meals. Ask your doctor or health care professional if you should avoid alcohol. Many nonprescription cough and cold products contain sugar or alcohol. These can affect blood sugar.  This medicine may cause ovulation in premenopausal women who do not have regular monthly periods. This may increase your chances of becoming pregnant. You should not take this medicine if you become pregnant or think you may be pregnant. Talk with your doctor or health care professional about your birth control options while taking this medicine. Contact your doctor or health care professional right away if you think you are pregnant.  If you are going to need surgery, a MRI, CT scan, or other procedure, tell your doctor that you are taking this medicine. You may need to stop taking this medicine before the procedure.  Wear a medical ID bracelet or chain, and carry a card that describes your disease and details of your medicine and dosage times.  This medicine may cause a decrease in folic acid and vitamin B12. You should make sure that you get enough vitamins while you are taking this medicine. Discuss the foods you eat and the vitamins you take with your health care professional.  What side effects may I notice from receiving this medicine?  Side effects that you should report to your doctor or health care professional as soon as possible:  · allergic reactions like skin rash, itching or hives, swelling of the face, lips, or tongue  · breathing problems  · feeling faint or lightheaded, falls  · muscle aches or pains  · signs and symptoms of low blood sugar such as feeling anxious, confusion, dizziness, increased hunger, unusually weak or tired, sweating, shakiness, cold, irritable, headache, blurred vision, fast heartbeat, loss of consciousness  · slow or irregular heartbeat  · unusual stomach pain or discomfort  · unusually  tired or weak  Side effects that usually do not require medical attention (report to your doctor or health care professional if they continue or are bothersome):  · diarrhea  · headache  · heartburn  · metallic taste in mouth  · nausea  · stomach gas, upset  This list may not describe all possible side effects. Call your doctor for medical advice about side effects. You may report side effects to FDA at 5-188-GOQ-9187.  Where should I keep my medicine?  Keep out of the reach of children.  Store at room temperature between 15 and 30 degrees C (59 and 86 degrees F). Protect from moisture and light. Throw away any unused medicine after the expiration date.  NOTE: This sheet is a summary. It may not cover all possible information. If you have questions about this medicine, talk to your doctor, pharmacist, or health care provider.  © 2021 [employer] (2019-01-24 19:15:19)

## 2021-10-04 NOTE — PROGRESS NOTES
Isabell Oakes  1995  [id number]  Patient Care Team:  Yamilex Khan APRN as PCP - General (Internal Medicine)    Isabell Oakes is a pleasant 25 y.o. female who presents for evaluation of Hypertension    Chief Complaint   Patient presents with   • Hypertension       HPI:   Juanjo is here for routine follow-up.  She has a pertinent medical history including prediabetes, hypertension, hyperlipidemia, morbid obesity, and psoriasis.    Not checking blood pressure at home regularly.  Compliant with HCTZ 25 mg daily. Reports good hydration.  No leg swelling.    Hypothyroid: Due to recheck TSH.  Cannot tell a great deal of difference after starting levothyroxine 4/16/2021.  After 2 increases she is currently on 75 mcg.  Obesity: Down 8 lbs since May 7th, still seeing Idalia Katz RD  Prediabetes: A1c 6.7 today which is up from 6.3 in June.  We will start metformin today,  She has a history of loose stools anyway, start low dose once daily  History reviewed. No pertinent past medical history.  Past Surgical History:   Procedure Laterality Date   • TONSILLECTOMY  2001     Family History   Problem Relation Age of Onset   • Hypertension Mother    • Bipolar disorder Father    • Arthritis Maternal Grandmother    • Depression Maternal Grandmother    • Hypertension Maternal Grandmother    • Hyperlipidemia Maternal Grandmother    • Cancer Maternal Grandfather         melanoma?   • Arthritis Paternal Grandmother    • Depression Paternal Grandmother    • Hypertension Paternal Grandmother    • Cancer Paternal Grandfather         lung (smoker)   • Heart disease Paternal Grandfather      Social History     Tobacco Use   Smoking Status Never Smoker   Smokeless Tobacco Never Used     No Known Allergies    Current Outpatient Medications:   •  escitalopram (LEXAPRO) 20 MG tablet, Take 1 tablet by mouth Every Morning., Disp: , Rfl:   •  hydroCHLOROthiazide (HYDRODIURIL) 25 MG tablet, Take 1 tablet by mouth Daily., Disp: 90  "tablet, Rfl: 1  •  lamoTRIgine (LaMICtal) 200 MG tablet, Take 1 tablet by mouth Every Morning., Disp: , Rfl:   •  levothyroxine (SYNTHROID, LEVOTHROID) 75 MCG tablet, TAKE ONE TABLET BY MOUTH DAILY, Disp: 90 tablet, Rfl: 1  •  Norethindrone Acet-Ethinyl Est (Tessa 1.5/30) 1.5-30 MG-MCG tablet, Take 1 tablet by mouth Daily., Disp: 1 each, Rfl: 5  •  Tremfya 100 MG/ML solution pen-injector, Every 3 (Three) Months., Disp: , Rfl:   •  metFORMIN (Glucophage) 500 MG tablet, Take 1 tablet by mouth Daily., Disp: 30 tablet, Rfl: 2    Review of Systems  /80   Pulse 100   Temp 98 °F (36.7 °C) (Temporal)   Ht 170 cm (66.93\")   Wt 102 kg (225 lb)   BMI 35.31 kg/m²     Physical Exam  Constitutional:       Appearance: She is well-developed.   HENT:      Head: Normocephalic and atraumatic.      Comments: *wearing mask  Eyes:      Conjunctiva/sclera: Conjunctivae normal.      Pupils: Pupils are equal, round, and reactive to light.   Cardiovascular:      Rate and Rhythm: Normal rate and regular rhythm.      Pulses: Normal pulses.      Heart sounds: Normal heart sounds.   Pulmonary:      Effort: Pulmonary effort is normal.      Breath sounds: Normal breath sounds.   Musculoskeletal:         General: Normal range of motion.      Cervical back: Normal range of motion and neck supple.   Skin:     General: Skin is warm and dry.   Neurological:      Mental Status: She is alert and oriented to person, place, and time.   Psychiatric:         Mood and Affect: Mood normal.         Behavior: Behavior normal.         Thought Content: Thought content normal.         Judgment: Judgment normal.         Procedures    Results Review:  I reviewed the patient's new clinical results.    PHQ-9 Total Score:      Assessment/Plan:  Diagnoses and all orders for this visit:    1. Diabetes mellitus, new onset (HCC) (Primary)  Comments:  Adding Metformin 500 mg once a day with meal, continue with Idalia Katz RD  Orders:  -     POC Glycosylated " Hemoglobin (Hb A1C)  -     metFORMIN (Glucophage) 500 MG tablet; Take 1 tablet by mouth Daily.  Dispense: 30 tablet; Refill: 2  -     Lipid Panel; Future    2. Benign essential hypertension  Comments:  Continue HCTZ 25 mg daily and monitor BP at home    3. Elevated liver function tests  Comments:  Recheck CMP  Orders:  -     Comprehensive Metabolic Panel; Future    4. Hypothyroidism, unspecified type  Comments:  Recheck TSH, continue levothyroxine  Orders:  -     TSH Rfx On Abnormal To Free T4; Future    5. Morbid obesity (HCC)  Comments:  Is down 8 pounds from May, great job!       Patient Instructions   Diabetes Mellitus and Nutrition, Adult  When you have diabetes, or diabetes mellitus, it is very important to have healthy eating habits because your blood sugar (glucose) levels are greatly affected by what you eat and drink. Eating healthy foods in the right amounts, at about the same times every day, can help you:  · Control your blood glucose.  · Lower your risk of heart disease.  · Improve your blood pressure.  · Reach or maintain a healthy weight.  What can affect my meal plan?  Every person with diabetes is different, and each person has different needs for a meal plan. Your health care provider may recommend that you work with a dietitian to make a meal plan that is best for you. Your meal plan may vary depending on factors such as:  · The calories you need.  · The medicines you take.  · Your weight.  · Your blood glucose, blood pressure, and cholesterol levels.  · Your activity level.  · Other health conditions you have, such as heart or kidney disease.  How do carbohydrates affect me?  Carbohydrates, also called carbs, affect your blood glucose level more than any other type of food. Eating carbs naturally raises the amount of glucose in your blood. Carb counting is a method for keeping track of how many carbs you eat. Counting carbs is important to keep your blood glucose at a healthy level, especially  "if you use insulin or take certain oral diabetes medicines.  It is important to know how many carbs you can safely have in each meal. This is different for every person. Your dietitian can help you calculate how many carbs you should have at each meal and for each snack.  How does alcohol affect me?  Alcohol can cause a sudden decrease in blood glucose (hypoglycemia), especially if you use insulin or take certain oral diabetes medicines. Hypoglycemia can be a life-threatening condition. Symptoms of hypoglycemia, such as sleepiness, dizziness, and confusion, are similar to symptoms of having too much alcohol.  · Do not drink alcohol if:  ? Your health care provider tells you not to drink.  ? You are pregnant, may be pregnant, or are planning to become pregnant.  · If you drink alcohol:  ? Do not drink on an empty stomach.  ? Limit how much you use to:  § 0-1 drink a day for women.  § 0-2 drinks a day for men.  ? Be aware of how much alcohol is in your drink. In the [place] one drink equals one 12 oz bottle of beer (355 mL), one 5 oz glass of wine (148 mL), or one 1½ oz glass of hard liquor (44 mL).  ? Keep yourself hydrated with water, diet soda, or unsweetened iced tea.  § Keep in mind that regular soda, juice, and other mixers may contain a lot of sugar and must be counted as carbs.  What are tips for following this plan?    Reading food labels  · Start by checking the serving size on the \"Nutrition Facts\" label of packaged foods and drinks. The amount of calories, carbs, fats, and other nutrients listed on the label is based on one serving of the item. Many items contain more than one serving per package.  · Check the total grams (g) of carbs in one serving. You can calculate the number of servings of carbs in one serving by dividing the total carbs by 15. For example, if a food has 30 g of total carbs per serving, it would be equal to 2 servings of carbs.  · Check the number of grams (g) of saturated fats and trans " "fats in one serving. Choose foods that have a low amount or none of these fats.  · Check the number of milligrams (mg) of salt (sodium) in one serving. Most people should limit total sodium intake to less than 2,300 mg per day.  · Always check the nutrition information of foods labeled as \"low-fat\" or \"nonfat.\" These foods may be higher in added sugar or refined carbs and should be avoided.  · Talk to your dietitian to identify your daily goals for nutrients listed on the label.  Shopping  · Avoid buying canned, pre-made, or processed foods. These foods tend to be high in fat, sodium, and added sugar.  · Shop around the outside edge of the grocery store. This is where you will most often find fresh fruits and vegetables, bulk grains, fresh meats, and fresh dairy.  Cooking  · Use low-heat cooking methods, such as baking, instead of high-heat cooking methods like deep frying.  · Cook using healthy oils, such as olive, canola, or sunflower oil.  · Avoid cooking with butter, cream, or high-fat meats.  Meal planning  · Eat meals and snacks regularly, preferably at the same times every day. Avoid going long periods of time without eating.  · Eat foods that are high in fiber, such as fresh fruits, vegetables, beans, and whole grains. Talk with your dietitian about how many servings of carbs you can eat at each meal.  · Eat 4-6 oz (112-168 g) of lean protein each day, such as lean meat, chicken, fish, eggs, or tofu. One ounce (oz) of lean protein is equal to:  ? 1 oz (28 g) of meat, chicken, or fish.  ? 1 egg.  ? ¼ cup (62 g) of tofu.  · Eat some foods each day that contain healthy fats, such as avocado, nuts, seeds, and fish.  What foods should I eat?  Fruits  Berries. Apples. Oranges. Peaches. Apricots. Plums. Grapes. [place]. Papaya. Pomegranate. Kiwi. Cherries.  Vegetables  Lettuce. Spinach. Leafy greens, including kale, chard, juno greens, and mustard greens. Beets. Cauliflower. Cabbage. Broccoli. Carrots. Green " beans. Tomatoes. Peppers. Onions. Cucumbers. Temple sprouts.  Grains  Whole grains, such as whole-wheat or whole-grain bread, crackers, tortillas, cereal, and pasta. Unsweetened oatmeal. Quinoa. Brown or wild rice.  Meats and other proteins  Seafood. Poultry without skin. Lean cuts of poultry and beef. Tofu. Nuts. Seeds.  Dairy  Low-fat or fat-free dairy products such as milk, yogurt, and cheese.  The items listed above may not be a complete list of foods and beverages you can eat. Contact a dietitian for more information.  What foods should I avoid?  Fruits  Fruits canned with syrup.  Vegetables  Canned vegetables. Frozen vegetables with butter or cream sauce.  Grains  Refined white flour and flour products such as bread, pasta, snack foods, and cereals. Avoid all processed foods.  Meats and other proteins  Fatty cuts of meat. Poultry with skin. Breaded or fried meats. Processed meat. Avoid saturated fats.  Dairy  Full-fat yogurt, cheese, or milk.  Beverages  Sweetened drinks, such as soda or iced tea.  The items listed above may not be a complete list of foods and beverages you should avoid. Contact a dietitian for more information.  Questions to ask a health care provider  · Do I need to meet with a diabetes educator?  · Do I need to meet with a dietitian?  · What number can I call if I have questions?  · When are the best times to check my blood glucose?  Where to find more information:  · American Diabetes Association: diabetes.org  · Academy of Nutrition and Dietetics: www.eatright.org  · National Fitzpatrick of Diabetes and Digestive and Kidney Diseases: www.niddk.nih.gov  · Association of Diabetes Care and Education Specialists: www.diabeteseducator.org  Summary  · It is important to have healthy eating habits because your blood sugar (glucose) levels are greatly affected by what you eat and drink.  · A healthy meal plan will help you control your blood glucose and maintain a healthy lifestyle.  · Your  health care provider may recommend that you work with a dietitian to make a meal plan that is best for you.  · Keep in mind that carbohydrates (carbs) and alcohol have immediate effects on your blood glucose levels. It is important to count carbs and to use alcohol carefully.  This information is not intended to replace advice given to you by your health care provider. Make sure you discuss any questions you have with your health care provider.  Document Revised: 11/24/2020 Document Reviewed: 11/24/2020  [employer] Patient Education © 2021 [employer].  Metformin tablets  What is this medicine?  METFORMIN (met FOR min) is used to treat type 2 diabetes. It helps to control blood sugar. Treatment is combined with diet and exercise. This medicine can be used alone or with other medicines for diabetes.  This medicine may be used for other purposes; ask your health care provider or pharmacist if you have questions.  COMMON BRAND NAME(S): Glucophage  What should I tell my health care provider before I take this medicine?  They need to know if you have any of these conditions:  · anemia  · dehydration  · heart disease  · frequently drink alcohol-containing beverages  · kidney disease  · liver disease  · polycystic ovary syndrome  · serious infection or injury  · vomiting  · an unusual or allergic reaction to metformin, other medicines, foods, dyes, or preservatives  · pregnant or trying to get pregnant  · breast-feeding  How should I use this medicine?  Take this medicine by mouth with a glass of water. Follow the directions on the prescription label. Take this medicine with food. Take your medicine at regular intervals. Do not take your medicine more often than directed. Do not stop taking except on your doctor's advice.  Talk to your pediatrician regarding the use of this medicine in children. While this drug may be prescribed for children as young as 10 years of age for selected conditions, precautions do  apply.  Overdosage: If you think you have taken too much of this medicine contact a poison control center or emergency room at once.  NOTE: This medicine is only for you. Do not share this medicine with others.  What if I miss a dose?  If you miss a dose, take it as soon as you can. If it is almost time for your next dose, take only that dose. Do not take double or extra doses.  What may interact with this medicine?  Do not take this medicine with any of the following medications:  · certain contrast medicines given before X-rays, CT scans, MRI, or other procedures  · dofetilide  This medicine may also interact with the following medications:  · acetazolamide  · alcohol  · certain antivirals for HIV or hepatitis  · certain medicines for blood pressure, heart disease, irregular heart beat  · cimetidine  · dichlorphenamide  · digoxin  · diuretics  · female hormones, like estrogens or progestins and birth control pills  · glycopyrrolate  · isoniazid  · lamotrigine  · memantine  · methazolamide  · metoclopramide  · midodrine  · niacin  · phenothiazines like chlorpromazine, mesoridazine, prochlorperazine, thioridazine  · phenytoin  · ranolazine  · steroid medicines like prednisone or cortisone  · stimulant medicines for attention disorders, weight loss, or to stay awake  · thyroid medicines  · topiramate  · trospium  · vandetanib  · zonisamide  This list may not describe all possible interactions. Give your health care provider a list of all the medicines, herbs, non-prescription drugs, or dietary supplements you use. Also tell them if you smoke, drink alcohol, or use illegal drugs. Some items may interact with your medicine.  What should I watch for while using this medicine?  Visit your doctor or health care professional for regular checks on your progress.  A test called the HbA1C (A1C) will be monitored. This is a simple blood test. It measures your blood sugar control over the last 2 to 3 months. You will receive  this test every 3 to 6 months.  Learn how to check your blood sugar. Learn the symptoms of low and high blood sugar and how to manage them.  Always carry a quick-source of sugar with you in case you have symptoms of low blood sugar. Examples include hard sugar candy or glucose tablets. Make sure others know that you can choke if you eat or drink when you develop serious symptoms of low blood sugar, such as seizures or unconsciousness. They must get medical help at once.  Tell your doctor or health care professional if you have high blood sugar. You might need to change the dose of your medicine. If you are sick or exercising more than usual, you might need to change the dose of your medicine.  Do not skip meals. Ask your doctor or health care professional if you should avoid alcohol. Many nonprescription cough and cold products contain sugar or alcohol. These can affect blood sugar.  This medicine may cause ovulation in premenopausal women who do not have regular monthly periods. This may increase your chances of becoming pregnant. You should not take this medicine if you become pregnant or think you may be pregnant. Talk with your doctor or health care professional about your birth control options while taking this medicine. Contact your doctor or health care professional right away if you think you are pregnant.  If you are going to need surgery, a MRI, CT scan, or other procedure, tell your doctor that you are taking this medicine. You may need to stop taking this medicine before the procedure.  Wear a medical ID bracelet or chain, and carry a card that describes your disease and details of your medicine and dosage times.  This medicine may cause a decrease in folic acid and vitamin B12. You should make sure that you get enough vitamins while you are taking this medicine. Discuss the foods you eat and the vitamins you take with your health care professional.  What side effects may I notice from receiving this  medicine?  Side effects that you should report to your doctor or health care professional as soon as possible:  · allergic reactions like skin rash, itching or hives, swelling of the face, lips, or tongue  · breathing problems  · feeling faint or lightheaded, falls  · muscle aches or pains  · signs and symptoms of low blood sugar such as feeling anxious, confusion, dizziness, increased hunger, unusually weak or tired, sweating, shakiness, cold, irritable, headache, blurred vision, fast heartbeat, loss of consciousness  · slow or irregular heartbeat  · unusual stomach pain or discomfort  · unusually tired or weak  Side effects that usually do not require medical attention (report to your doctor or health care professional if they continue or are bothersome):  · diarrhea  · headache  · heartburn  · metallic taste in mouth  · nausea  · stomach gas, upset  This list may not describe all possible side effects. Call your doctor for medical advice about side effects. You may report side effects to FDA at 4-452-FDA-2922.  Where should I keep my medicine?  Keep out of the reach of children.  Store at room temperature between 15 and 30 degrees C (59 and 86 degrees F). Protect from moisture and light. Throw away any unused medicine after the expiration date.  NOTE: This sheet is a summary. It may not cover all possible information. If you have questions about this medicine, talk to your doctor, pharmacist, or health care provider.  © 2021 [employer] (2019-01-24 19:15:19)      Plan of care reviewed with patient at the conclusion of today's visit. Education was provided regarding diagnosis, management and any prescribed or recommended OTC medications.  Patient verbalizes understanding of and agreement with management plan.    Return in about 3 months (around 1/4/2022) for Recheck.    *Note that portions of this note were completed with a voice recognition program.  Efforts were made to edit the dictation but  occasionally words are transcribed.    Yamilex Khan, APRN

## 2021-10-05 ENCOUNTER — LAB (OUTPATIENT)
Dept: LAB | Facility: HOSPITAL | Age: 26
End: 2021-10-05

## 2021-10-05 DIAGNOSIS — E11.9 DIABETES MELLITUS, NEW ONSET (HCC): ICD-10-CM

## 2021-10-05 DIAGNOSIS — E03.9 HYPOTHYROIDISM, UNSPECIFIED TYPE: ICD-10-CM

## 2021-10-05 DIAGNOSIS — R79.89 ELEVATED LIVER FUNCTION TESTS: ICD-10-CM

## 2021-10-05 LAB
CHOLEST SERPL-MCNC: 242 MG/DL (ref 0–200)
HDLC SERPL-MCNC: 35 MG/DL (ref 40–60)
LDLC SERPL CALC-MCNC: 157 MG/DL (ref 0–100)
LDLC/HDLC SERPL: 4.39 {RATIO}
TRIGL SERPL-MCNC: 267 MG/DL (ref 0–150)
VLDLC SERPL-MCNC: 50 MG/DL (ref 5–40)

## 2021-10-05 PROCEDURE — 80061 LIPID PANEL: CPT

## 2021-10-05 PROCEDURE — 80053 COMPREHEN METABOLIC PANEL: CPT

## 2021-10-05 PROCEDURE — 84443 ASSAY THYROID STIM HORMONE: CPT

## 2021-10-06 LAB
ALBUMIN SERPL-MCNC: 4.5 G/DL (ref 3.5–5.2)
ALBUMIN/GLOB SERPL: 1.3 G/DL
ALP SERPL-CCNC: 106 U/L (ref 39–117)
ALT SERPL W P-5'-P-CCNC: 35 U/L (ref 1–33)
ANION GAP SERPL CALCULATED.3IONS-SCNC: 11.7 MMOL/L (ref 5–15)
AST SERPL-CCNC: 28 U/L (ref 1–32)
BILIRUB SERPL-MCNC: 0.3 MG/DL (ref 0–1.2)
BUN SERPL-MCNC: 14 MG/DL (ref 6–20)
BUN/CREAT SERPL: 19.4 (ref 7–25)
CALCIUM SPEC-SCNC: 9.9 MG/DL (ref 8.6–10.5)
CHLORIDE SERPL-SCNC: 98 MMOL/L (ref 98–107)
CO2 SERPL-SCNC: 23.3 MMOL/L (ref 22–29)
CREAT SERPL-MCNC: 0.72 MG/DL (ref 0.57–1)
GFR SERPL CREATININE-BSD FRML MDRD: 100 ML/MIN/1.73
GLOBULIN UR ELPH-MCNC: 3.4 GM/DL
GLUCOSE SERPL-MCNC: 118 MG/DL (ref 65–99)
POTASSIUM SERPL-SCNC: 4.3 MMOL/L (ref 3.5–5.2)
PROT SERPL-MCNC: 7.9 G/DL (ref 6–8.5)
SODIUM SERPL-SCNC: 133 MMOL/L (ref 136–145)
TSH SERPL DL<=0.05 MIU/L-ACNC: 3.63 UIU/ML (ref 0.27–4.2)

## 2021-10-08 ENCOUNTER — TELEPHONE (OUTPATIENT)
Dept: INTERNAL MEDICINE | Facility: CLINIC | Age: 26
End: 2021-10-08

## 2021-11-30 ENCOUNTER — PATIENT MESSAGE (OUTPATIENT)
Dept: INTERNAL MEDICINE | Facility: CLINIC | Age: 26
End: 2021-11-30

## 2021-11-30 DIAGNOSIS — R41.840 DIFFICULTY CONCENTRATING: Primary | ICD-10-CM

## 2021-12-14 RX ORDER — NORETHINDRONE ACETATE AND ETHINYL ESTRADIOL 1.5; .03 MG/1; MG/1
TABLET ORAL
Qty: 1 EACH | Refills: 5 | Status: SHIPPED | OUTPATIENT
Start: 2021-12-14 | End: 2022-06-02

## 2021-12-14 NOTE — TELEPHONE ENCOUNTER
Rx Refill Note  Requested Prescriptions     Pending Prescriptions Disp Refills   • Abril 1.5/30 1.5-30 MG-MCG tablet [Pharmacy Med Name: ABRIL 21 1.5 MG-30 MCG TAB]       Sig: TAKE ONE TABLET BY MOUTH DAILY      Last office visit with prescribing clinician: 10/4/2021      Next office visit with prescribing clinician: 1/17/2022            Hope Newell LPN  12/14/21, 09:44 EST

## 2021-12-15 ENCOUNTER — TELEMEDICINE (OUTPATIENT)
Dept: INTERNAL MEDICINE | Facility: CLINIC | Age: 26
End: 2021-12-15

## 2021-12-15 DIAGNOSIS — J06.9 UPPER RESPIRATORY TRACT INFECTION, UNSPECIFIED TYPE: Primary | ICD-10-CM

## 2021-12-15 PROCEDURE — 99213 OFFICE O/P EST LOW 20 MIN: CPT | Performed by: NURSE PRACTITIONER

## 2021-12-15 RX ORDER — AMOXICILLIN AND CLAVULANATE POTASSIUM 875; 125 MG/1; MG/1
1 TABLET, FILM COATED ORAL 2 TIMES DAILY
Qty: 14 TABLET | Refills: 0 | Status: SHIPPED | OUTPATIENT
Start: 2021-12-15 | End: 2022-01-17

## 2021-12-15 NOTE — PROGRESS NOTES
Isabell Oakes  1995  [id number]  Patient Care Team:  Yamilex Khan APRN as PCP - General (Internal Medicine)    Isabell Oakes is a pleasant 26 y.o. female who presents for evaluation of URI    This video  visit occurred during the Coronavirus (Covid-19) public health emergency.  Time spent was approximately 12 minutes.  Patient identity has been confirmed using name and date of birth.  I discussed with the patient the nature of our telemedicine visits that:  --I would evaluate the patient and recommend diagnostics and treatment based on my assessment.  --Our sessions are not being recorded in the personal health information is protected.  --Our team would provide follow-up care in person if/when needed.  You have chosen to receive care through a telehealth visit.  Do you consent to use a video/audio connection for your medical care today? Yes    Chief Complaint   Patient presents with   • URI     Juanjo has a pertinent medical history including prediabetes, hypertension, hyperlipidemia, morbid obesity, and psoriasis.    HPI:   Ear pressure, PND, nasal congestion, sinus pressure for the past week.  Worse last night with fever and chills.  Feels better after waking from a nap, was sweating and felt like her fever broke. Has not been using any otc meds. Was unsure what to take with her blood pressure meds.    Had 2 Moderna vaccines this spring.  History reviewed. No pertinent past medical history.  Past Surgical History:   Procedure Laterality Date   • TONSILLECTOMY  2001     Family History   Problem Relation Age of Onset   • Hypertension Mother    • Bipolar disorder Father    • Arthritis Maternal Grandmother    • Depression Maternal Grandmother    • Hypertension Maternal Grandmother    • Hyperlipidemia Maternal Grandmother    • Cancer Maternal Grandfather         melanoma?   • Arthritis Paternal Grandmother    • Depression Paternal Grandmother    • Hypertension Paternal Grandmother    •  Cancer Paternal Grandfather         lung (smoker)   • Heart disease Paternal Grandfather      Social History     Tobacco Use   Smoking Status Never Smoker   Smokeless Tobacco Never Used     No Known Allergies    Current Outpatient Medications:   •  amoxicillin-clavulanate (Augmentin) 875-125 MG per tablet, Take 1 tablet by mouth 2 (Two) Times a Day., Disp: 14 tablet, Rfl: 0  •  escitalopram (LEXAPRO) 20 MG tablet, Take 1 tablet by mouth Every Morning., Disp: , Rfl:   •  Tessa 1.5/30 1.5-30 MG-MCG tablet, TAKE ONE TABLET BY MOUTH DAILY, Disp: 1 each, Rfl: 5  •  hydroCHLOROthiazide (HYDRODIURIL) 25 MG tablet, Take 1 tablet by mouth Daily., Disp: 90 tablet, Rfl: 1  •  lamoTRIgine (LaMICtal) 200 MG tablet, Take 1 tablet by mouth Every Morning., Disp: , Rfl:   •  levothyroxine (SYNTHROID, LEVOTHROID) 75 MCG tablet, TAKE ONE TABLET BY MOUTH DAILY, Disp: 90 tablet, Rfl: 1  •  metFORMIN (Glucophage) 500 MG tablet, Take 1 tablet by mouth Daily., Disp: 30 tablet, Rfl: 2  •  Tremfya 100 MG/ML solution pen-injector, Every 3 (Three) Months., Disp: , Rfl:     Review of Systems  There were no vitals taken for this visit.    Physical Exam  Vitals reviewed.   Constitutional:       Appearance: She is well-developed.   Pulmonary:      Effort: Pulmonary effort is normal. No respiratory distress.   Neurological:      Mental Status: She is alert.   Psychiatric:         Mood and Affect: Mood normal.         Behavior: Behavior normal.         Thought Content: Thought content normal.         Judgment: Judgment normal.         Procedures    Results Review:  None    PHQ-9 Total Score:      Assessment/Plan:  Diagnoses and all orders for this visit:    1. Upper respiratory tract infection, unspecified type (Primary)  Comments:  Add plain generic Mucinex twice daily and antihistamine.  If worse symptoms after 2-3 more days add Augmentin    Other orders  -     amoxicillin-clavulanate (Augmentin) 875-125 MG per tablet; Take 1 tablet by mouth 2  (Two) Times a Day.  Dispense: 14 tablet; Refill: 0       There are no Patient Instructions on file for this visit.  Plan of care reviewed with patient at the conclusion of today's visit. Education was provided regarding diagnosis, management and any prescribed or recommended OTC medications.  Patient verbalizes understanding of and agreement with management plan.    Return for Next scheduled follow up.    Dictated Utilizing Dragon Dictation.    VIVIANE Rowan

## 2021-12-31 DIAGNOSIS — E11.9 DIABETES MELLITUS, NEW ONSET: ICD-10-CM

## 2022-01-17 ENCOUNTER — OFFICE VISIT (OUTPATIENT)
Dept: INTERNAL MEDICINE | Facility: CLINIC | Age: 27
End: 2022-01-17

## 2022-01-17 ENCOUNTER — TRANSCRIBE ORDERS (OUTPATIENT)
Dept: LAB | Facility: HOSPITAL | Age: 27
End: 2022-01-17

## 2022-01-17 ENCOUNTER — LAB (OUTPATIENT)
Dept: LAB | Facility: HOSPITAL | Age: 27
End: 2022-01-17

## 2022-01-17 VITALS
WEIGHT: 217 LBS | HEART RATE: 88 BPM | HEIGHT: 67 IN | BODY MASS INDEX: 34.06 KG/M2 | DIASTOLIC BLOOD PRESSURE: 72 MMHG | SYSTOLIC BLOOD PRESSURE: 112 MMHG | TEMPERATURE: 96.8 F

## 2022-01-17 DIAGNOSIS — E03.9 HYPOTHYROIDISM, UNSPECIFIED TYPE: ICD-10-CM

## 2022-01-17 DIAGNOSIS — Z79.899 ENCOUNTER FOR LONG-TERM (CURRENT) USE OF OTHER MEDICATIONS: ICD-10-CM

## 2022-01-17 DIAGNOSIS — I10 BENIGN ESSENTIAL HYPERTENSION: ICD-10-CM

## 2022-01-17 DIAGNOSIS — R73.03 PREDIABETES: ICD-10-CM

## 2022-01-17 DIAGNOSIS — E11.9 TYPE 2 DIABETES MELLITUS WITHOUT COMPLICATION, WITHOUT LONG-TERM CURRENT USE OF INSULIN: Primary | ICD-10-CM

## 2022-01-17 DIAGNOSIS — Z79.899 ENCOUNTER FOR LONG-TERM (CURRENT) USE OF OTHER MEDICATIONS: Primary | ICD-10-CM

## 2022-01-17 LAB
ALBUMIN SERPL-MCNC: 4.6 G/DL (ref 3.5–5.2)
ALBUMIN/GLOB SERPL: 1.6 G/DL
ALP SERPL-CCNC: 104 U/L (ref 39–117)
ALT SERPL W P-5'-P-CCNC: 111 U/L (ref 1–33)
ANION GAP SERPL CALCULATED.3IONS-SCNC: 12.8 MMOL/L (ref 5–15)
AST SERPL-CCNC: 83 U/L (ref 1–32)
BASOPHILS # BLD AUTO: 0.06 10*3/MM3 (ref 0–0.2)
BASOPHILS NFR BLD AUTO: 0.7 % (ref 0–1.5)
BILIRUB SERPL-MCNC: 0.5 MG/DL (ref 0–1.2)
BUN SERPL-MCNC: 11 MG/DL (ref 6–20)
BUN/CREAT SERPL: 12.9 (ref 7–25)
CALCIUM SPEC-SCNC: 9.7 MG/DL (ref 8.6–10.5)
CHLORIDE SERPL-SCNC: 99 MMOL/L (ref 98–107)
CHOLEST SERPL-MCNC: 236 MG/DL (ref 0–200)
CO2 SERPL-SCNC: 24.2 MMOL/L (ref 22–29)
CREAT SERPL-MCNC: 0.85 MG/DL (ref 0.57–1)
DEPRECATED RDW RBC AUTO: 41.9 FL (ref 37–54)
EOSINOPHIL # BLD AUTO: 0.17 10*3/MM3 (ref 0–0.4)
EOSINOPHIL NFR BLD AUTO: 2.1 % (ref 0.3–6.2)
ERYTHROCYTE [DISTWIDTH] IN BLOOD BY AUTOMATED COUNT: 13.3 % (ref 12.3–15.4)
GFR SERPL CREATININE-BSD FRML MDRD: 81 ML/MIN/1.73
GLOBULIN UR ELPH-MCNC: 2.8 GM/DL
GLUCOSE SERPL-MCNC: 94 MG/DL (ref 65–99)
HCT VFR BLD AUTO: 43.3 % (ref 34–46.6)
HDLC SERPL-MCNC: 39 MG/DL (ref 40–60)
HGB BLD-MCNC: 13.9 G/DL (ref 12–15.9)
IMM GRANULOCYTES # BLD AUTO: 0.02 10*3/MM3 (ref 0–0.05)
IMM GRANULOCYTES NFR BLD AUTO: 0.2 % (ref 0–0.5)
LDLC SERPL CALC-MCNC: 158 MG/DL (ref 0–100)
LDLC/HDLC SERPL: 3.97 {RATIO}
LYMPHOCYTES # BLD AUTO: 3.05 10*3/MM3 (ref 0.7–3.1)
LYMPHOCYTES NFR BLD AUTO: 37.5 % (ref 19.6–45.3)
MCH RBC QN AUTO: 27.9 PG (ref 26.6–33)
MCHC RBC AUTO-ENTMCNC: 32.1 G/DL (ref 31.5–35.7)
MCV RBC AUTO: 86.9 FL (ref 79–97)
MONOCYTES # BLD AUTO: 0.43 10*3/MM3 (ref 0.1–0.9)
MONOCYTES NFR BLD AUTO: 5.3 % (ref 5–12)
NEUTROPHILS NFR BLD AUTO: 4.41 10*3/MM3 (ref 1.7–7)
NEUTROPHILS NFR BLD AUTO: 54.2 % (ref 42.7–76)
NRBC BLD AUTO-RTO: 0 /100 WBC (ref 0–0.2)
PLATELET # BLD AUTO: 358 10*3/MM3 (ref 140–450)
PMV BLD AUTO: 11 FL (ref 6–12)
POTASSIUM SERPL-SCNC: 4.4 MMOL/L (ref 3.5–5.2)
PROT SERPL-MCNC: 7.4 G/DL (ref 6–8.5)
RBC # BLD AUTO: 4.98 10*6/MM3 (ref 3.77–5.28)
SODIUM SERPL-SCNC: 136 MMOL/L (ref 136–145)
TRIGL SERPL-MCNC: 210 MG/DL (ref 0–150)
TSH SERPL DL<=0.05 MIU/L-ACNC: 1.49 UIU/ML (ref 0.27–4.2)
VLDLC SERPL-MCNC: 39 MG/DL (ref 5–40)
WBC NRBC COR # BLD: 8.14 10*3/MM3 (ref 3.4–10.8)

## 2022-01-17 PROCEDURE — 86480 TB TEST CELL IMMUN MEASURE: CPT

## 2022-01-17 PROCEDURE — 99214 OFFICE O/P EST MOD 30 MIN: CPT | Performed by: NURSE PRACTITIONER

## 2022-01-17 PROCEDURE — 80061 LIPID PANEL: CPT

## 2022-01-17 PROCEDURE — 85025 COMPLETE CBC W/AUTO DIFF WBC: CPT

## 2022-01-17 PROCEDURE — 84443 ASSAY THYROID STIM HORMONE: CPT

## 2022-01-17 PROCEDURE — 83036 HEMOGLOBIN GLYCOSYLATED A1C: CPT | Performed by: NURSE PRACTITIONER

## 2022-01-17 PROCEDURE — 36415 COLL VENOUS BLD VENIPUNCTURE: CPT

## 2022-01-17 PROCEDURE — 80053 COMPREHEN METABOLIC PANEL: CPT

## 2022-01-17 RX ORDER — HYDROCHLOROTHIAZIDE 25 MG/1
25 TABLET ORAL DAILY
Qty: 90 TABLET | Refills: 1 | Status: SHIPPED | OUTPATIENT
Start: 2022-01-17 | End: 2022-07-13

## 2022-01-17 NOTE — PROGRESS NOTES
Isabell Oakes  1995  [id number]  Patient Care Team:  Yamilex Khan APRN as PCP - General (Internal Medicine)    Isabell Oakes is a pleasant 26 y.o. female who presents for evaluation of Diabetes and Hypertension    Chief Complaint   Patient presents with   • Diabetes   • Hypertension     HPI:   Juanjo has a pertinent medical history including prediabetes, hypothyroidism, hypertension, hyperlipidemia, morbid obesity, and psoriasis. She is a .  She had Covid 19 in Dec.  She sees mental health provider at [facility].    DM:  Started on metformin after visit 10/4/21. A1C Down to 5.6 today from 6.7 last visit!  Hypothyroidism:  Levo increased June 2021.  Less fatigue overall.  HTN: checking less, feeling well overall.  History reviewed. No pertinent past medical history.  Past Surgical History:   Procedure Laterality Date   • TONSILLECTOMY  2001     Family History   Problem Relation Age of Onset   • Hypertension Mother    • Bipolar disorder Father    • Arthritis Maternal Grandmother    • Depression Maternal Grandmother    • Hypertension Maternal Grandmother    • Hyperlipidemia Maternal Grandmother    • Cancer Maternal Grandfather         melanoma?   • Arthritis Paternal Grandmother    • Depression Paternal Grandmother    • Hypertension Paternal Grandmother    • Cancer Paternal Grandfather         lung (smoker)   • Heart disease Paternal Grandfather      Social History     Tobacco Use   Smoking Status Never Smoker   Smokeless Tobacco Never Used     No Known Allergies    Current Outpatient Medications:   •  escitalopram (LEXAPRO) 20 MG tablet, Take 1 tablet by mouth Every Morning., Disp: , Rfl:   •  Tessa 1.5/30 1.5-30 MG-MCG tablet, TAKE ONE TABLET BY MOUTH DAILY, Disp: 1 each, Rfl: 5  •  hydroCHLOROthiazide (HYDRODIURIL) 25 MG tablet, Take 1 tablet by mouth Daily., Disp: 90 tablet, Rfl: 1  •  lamoTRIgine (LaMICtal) 200 MG tablet, Take 1 tablet by mouth Every Morning.,  "Disp: , Rfl:   •  levothyroxine (SYNTHROID, LEVOTHROID) 75 MCG tablet, TAKE ONE TABLET BY MOUTH DAILY, Disp: 90 tablet, Rfl: 1  •  metFORMIN (GLUCOPHAGE) 500 MG tablet, TAKE ONE TABLET BY MOUTH DAILY, Disp: 90 tablet, Rfl: 1  •  Tremfya 100 MG/ML solution pen-injector, Every 3 (Three) Months., Disp: , Rfl:     Review of Systems  /72 (BP Location: Left arm, Patient Position: Sitting, Cuff Size: Large Adult)   Pulse 88   Temp 96.8 °F (36 °C) (Temporal)   Ht 170 cm (66.93\")   Wt 98.4 kg (217 lb)   BMI 34.06 kg/m²     Physical Exam  Constitutional:       Appearance: She is well-developed.   HENT:      Head: Normocephalic and atraumatic.      Comments: *wearing mask  Eyes:      Conjunctiva/sclera: Conjunctivae normal.      Pupils: Pupils are equal, round, and reactive to light.   Cardiovascular:      Rate and Rhythm: Normal rate and regular rhythm.      Pulses: Normal pulses.      Heart sounds: Normal heart sounds.   Pulmonary:      Effort: Pulmonary effort is normal.      Breath sounds: Normal breath sounds.   Musculoskeletal:         General: Normal range of motion.      Cervical back: Normal range of motion and neck supple.   Skin:     General: Skin is warm and dry.   Neurological:      Mental Status: She is alert and oriented to person, place, and time.   Psychiatric:         Mood and Affect: Mood normal.         Behavior: Behavior normal.         Thought Content: Thought content normal.         Judgment: Judgment normal.         Procedures    Results Review:  I reviewed the patient's new clinical results.    PHQ-9 Total Score: 0    Assessment/Plan:  Diagnoses and all orders for this visit:    1. Type 2 diabetes mellitus without complication, without long-term current use of insulin (HCC) (Primary)  Comments:  excellent progress with exercise, diet and weight loss!  continue metformin daily  Orders:  -     Lipid Panel; Future  -     POC Glycosylated Hemoglobin (Hb A1C)    2. Benign essential " hypertension  Comments:  good control on current meds, continue exercise and low sodium diet  Orders:  -     Comprehensive Metabolic Panel; Future    3. Hypothyroidism, unspecified type  Comments:  repeat labs today  Orders:  -     TSH Rfx On Abnormal To Free T4; Future    Other orders  -     hydroCHLOROthiazide (HYDRODIURIL) 25 MG tablet; Take 1 tablet by mouth Daily.  Dispense: 90 tablet; Refill: 1       Patient Instructions   Goal is to maintain an A1C reading below 7%   Goal is for diabetics to have an eye exam every year to check for diabetic retinopathy.  Goal is to have yearly foot exams.  You need to check your feet every day for sores or cracks.  Dietary goal is: to decrease carbohydrates, especially breads, potatoes, rice, and pasta. No sweetened beverages  Exercise goal is:  Goal is to exercise a minimum of 150 minutes/week (ex. 30 min. of walking 5 days per week).  Discussed potential for long-term complications with uncontrolled diabetes including nephropathy, neuropathy, retinopathy, and increased risk for cardiac disease.      Plan of care reviewed with patient at the conclusion of today's visit. Education was provided regarding diagnosis, management and any prescribed or recommended OTC medications.  Patient verbalizes understanding of and agreement with management plan.    Return in about 3 months (around 4/17/2022) for Next scheduled follow up, Labs this visit.    Dictated Utilizing Dragon Dictation.    VIVIANE Rowan

## 2022-01-19 LAB
EXPIRATION DATE: NORMAL
HBA1C MFR BLD: 5.6 % (ref 4.8–5.6)
Lab: NORMAL

## 2022-01-19 NOTE — PATIENT INSTRUCTIONS
Goal is to maintain an A1C reading below 7%   Goal is for diabetics to have an eye exam every year to check for diabetic retinopathy.  Goal is to have yearly foot exams.  You need to check your feet every day for sores or cracks.  Dietary goal is: to decrease carbohydrates, especially breads, potatoes, rice, and pasta. No sweetened beverages  Exercise goal is:  Goal is to exercise a minimum of 150 minutes/week (ex. 30 min. of walking 5 days per week).  Discussed potential for long-term complications with uncontrolled diabetes including nephropathy, neuropathy, retinopathy, and increased risk for cardiac disease.

## 2022-01-20 LAB
GAMMA INTERFERON BACKGROUND BLD IA-ACNC: 0 IU/ML
M TB IFN-G BLD-IMP: NEGATIVE
M TB IFN-G CD4+ BCKGRND COR BLD-ACNC: 0.01 IU/ML
M TB IFN-G CD4+CD8+ BCKGRND COR BLD-ACNC: 0 IU/ML
MITOGEN IGNF BLD-ACNC: >10 IU/ML
SERVICE CMNT-IMP: NORMAL

## 2022-01-24 ENCOUNTER — TELEMEDICINE (OUTPATIENT)
Dept: PSYCHIATRY | Facility: CLINIC | Age: 27
End: 2022-01-24

## 2022-01-24 DIAGNOSIS — F33.0 MILD EPISODE OF RECURRENT MAJOR DEPRESSIVE DISORDER: ICD-10-CM

## 2022-01-24 DIAGNOSIS — F90.2 ATTENTION DEFICIT HYPERACTIVITY DISORDER, COMBINED TYPE: Primary | Chronic | ICD-10-CM

## 2022-01-24 DIAGNOSIS — G47.9 SLEEPING DIFFICULTIES: ICD-10-CM

## 2022-01-24 DIAGNOSIS — F41.1 GENERALIZED ANXIETY DISORDER: ICD-10-CM

## 2022-01-24 PROCEDURE — 90792 PSYCH DIAG EVAL W/MED SRVCS: CPT | Performed by: NURSE PRACTITIONER

## 2022-01-24 RX ORDER — BUPROPION HYDROCHLORIDE 150 MG/1
150 TABLET ORAL DAILY
Qty: 30 TABLET | Refills: 0 | Status: SHIPPED | OUTPATIENT
Start: 2022-01-24 | End: 2022-02-21

## 2022-01-24 RX ORDER — ESCITALOPRAM OXALATE 20 MG/1
20 TABLET ORAL EVERY MORNING
Qty: 90 TABLET | Refills: 0 | Status: SHIPPED | OUTPATIENT
Start: 2022-01-24 | End: 2022-04-04 | Stop reason: SDUPTHER

## 2022-01-24 NOTE — PROGRESS NOTES
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 Take 1 tablet by mouth Every Morning.  Dispense: 90 tablet; Refill: 0    4. Sleeping difficulties        Visit Diagnoses:    ICD-10-CM ICD-9-CM   1. Attention deficit hyperactivity disorder, combined type  F90.2 314.01   2. Mild episode of recurrent major depressive disorder (HCC)  F33.0 296.31   3. Generalized anxiety disorder  F41.1 300.02   4. Sleeping difficulties  G47.9 780.50          GOALS:  Short Term Goals: Patient will be compliant with medication, and patient will have no significant medication related side effects.  Patient will be engaged in psychotherapy as indicated.  Patient will report subjective improvement of symptoms.  Long term goals: To stabilize mood and treat/improve subjective symptoms, the patient will stay out of the hospital, the patient will be at an optimal level of functioning, and the patient will take all medications as prescribed.  The patient verbalized understanding and agreement with goals that were mutually set.      SUICIDE RISK ASSESSMENT: Unalterable demographics and a history of mental health intervention indicate this patient is in a high risk category compared to the general population. At present, the patient denies active SI/HI, intentions, or plans at this time and agrees to seek immediate  care should such thoughts develop. The patient verbalizes understanding of how to access emergency care if needed and agrees to do so. Consideration of suicide risk and protective factors such as history, current presentation, individual strengths and weaknesses, psychosocial and environmental stressors and variables, psychiatric illness and symptoms, medical conditions and pain, took place in this interview. Based on those considerations, the patient is determined: within individual baseline and presenting no imminent risk for suicide or homicide. Other recommendations: The patient does not meet the criteria for inpatient admission and is not a safety risk to self or others at today's visit. Inpatient treatment offers no significant advantages over outpatient treatment for this patient at today's visit.      SAFETY PLAN:  Patient was given ample time for questions and fully participated in treatment planning.  Patient was encouraged to call the clinic with any questions or concerns.  Patient was informed of access to emergency care. If patient were to develop any significant symptomatology, suicidal ideation, homicidal ideation, any concerns, or feel unsafe at any time they are to call the clinic and if unable to get immediate assistance should immediately call 911 or go to the nearest emergency room.  The patient is advised to remove or secure (lock away) all lethal weapons (including guns) and sharps (including razors, scissors, knives, etc.).  All medications (including any prescribed and any over the counter medications) should be stored in a safe and secured location that is not obtainable by children/adolescents.  Patient was given an opportunity and encouraged to ask questions about their medication, illness, and treatment. Patient contracted verbally for the following: If you are experiencing an emotional crisis or have thoughts of harming yourself or others, please go to your nearest local emergency room  or call 911. Will continue to re-assess medication response and side effects frequently to establish efficacy and ensure safety. Risks, any black box warnings, side effects, off label usage, and benefits of medication and treatment discussed with patient, along with potential adverse side effects of current and/or newly prescribed medication, alternative treatment options, and OTC medications.  Patient verbalized understanding of potential risks, any off label use of medication, any black box warnings, and any side effects in their own words. The patient verbalized understanding and agreed to comply with the safety plan discussed in their own words.  Patient given the number to the office. Number also available to the 24- hour suicide hotline.       TREATMENT PLAN: Continue supportive psychotherapy efforts and medications as indicated.  Medication and treatment options, both pharmacological and non-pharmacological treatment options, discussed during today's visit, including any off label use of medication. Patient acknowledged and verbally consented with current treatment plan and was educated on the importance of compliance with treatment and follow-up appointments.          -Discussed with the patient that based on today's evaluation/assessment and her report of symptoms, it does not appear that her previous diagnosis of bipolar/mood disorder is accurate.  Discussed with the patient that it appears that she has anxiety and depression, but she does not meet criteria for a bipolar/mood disorder.  Discussed with the patient that while some of the symptoms that she has endorsed can be seen with a bipolar/mood disorder, they can also noted with other diagnoses such as uncontrolled depression, anxiety, and/or ADHD, which is likely the case with her presentation of symptoms.  Discussed with the patient that she does meet diagnostic criteria for ADHD and we will begin treatment/medications to address and manage the  symptoms.  Discussed with patient that stimulant medications used for treatment of ADHD symptoms can potentially cause the induction of of hypomania/rozina if there is an underlying bipolar/mood disorder.  Discussed the patient that while this diagnosis is likely inaccurate, we will begin treatment for ADHD symptoms with a nonstimulant to ensure tolerability specifically regarding mood stability and potential induction of hypomania/rozina.  The patient verbalizes understanding and endorses that she is agreeable to this.  -Begin Wellbutrin  mg daily for ADHD and as adjunct for depression/anxiety  -Continue Lexapro 20 mg daily for depression and anxiety  -Continue Lamictal 200 mg daily as adjunct for depression and anxiety.  Discussed the patient that while this medication is generally used to treat bipolar/mood disorders and address mood instability, this medication can also be of benefit for managing anxiety and depression.  Discussed with patient that since it has been effective thus far for management of her anxiety and depression we will continue at this time.  Discussed with patient that if anxiety and depression continue to be well managed with initiating ADHD treatment and mood remains stable, we will potentially discuss tapering off the Lamictal.  The patient verbalizes understanding and endorses that she is agreeable to this.  -Begin psychotherapy per patient's request.  Referral placed to begin therapy with the [facility] at today's encounter.         MEDICATION ISSUES: Discussed medication options and treatment plan of prescribed medication, any off label use of medication, as well as the risks, benefits, any black box warnings including increased suicidality, and side effects including but not limited to potential falls, dizziness, possible impaired driving, GI side effects (change in appetite, abdominal discomfort, nausea, vomiting, diarrhea, and/or constipation), dry mouth,  somnolence, sedation, insomnia, activation, agitation, irritation, tremors, abnormal muscle movements or disorders, headache, sweating, possible bruising or rare bleeding, electrolyte and/or fluid abnormalities, change in blood pressure/heart rate/and or heart rhythm, sexual dysfunction, and metabolic adversities among others. Patient and/or guardian agreeable to call the office with any worsening of symptoms or onset of side effects, or if any concerns or questions arise.  The contact information for the office is made available to the patient and/or guardian.  Patient and/or guardian agreeable to call 911 or go to the nearest ER should they begin having any SI/HI, or if any urgent concerns arise. No medication side effects or related complaints today.    This APRN has discussed with the patient/guardian about the possibility of serotonin syndrome when certain medications are taken together, as is the case with this patient.  This APRN has provided the patient/guardian with a list of symptoms of serotonin syndrome including symptoms of autonomic instability, altered sensorium, confusion, restlessness, agitation, myoclonus, hyperreflexia, hyperthermia, diaphoresis, tremor, chills, diarrhea and cramps, ataxia, headache, migraines, seizures, and insomnia; which could lead to permanent hyperthermic brain damage, cardiovascular collapse, coma, or even death.  The patient/guardian are instructed to stop medications immediately and either contact this APRN/this office during regular office hours, or go to the emergency department/call 911, if they begin to experience any of the symptoms discussed.  The benefits and risks of the current medication regimen are discussed with the patient/guardian, and they feel that the benefits out weigh the risks.  The patient/guardian verbalized understanding and agreement in their own words.    This APRN has discussed the benefits and risks of taking Lamictal (Lamotrigine).  The side  effects of Lamictal can include a benign rash, blurred or double vision, dizziness, ataxia, sedation, headache, tremor, insomnia, poor coordination, fatigue,  nausea, vomiting, dyspepsia, rhinitis, infection, pharyngitis, asthenia, a rare but serious rash, rare multi-organ failure associated with Moraes-Juan F Syndrome, toxic epidermal necrolysis, drug hypersensitivity syndrome, rare blood dyscrasias, rare aseptic meningitis, rare sudden unexplained deaths in people with epilepsy, withdrawal seizures upon abrupt withdrawal, and rare activation of suicidal ideation and behavior (suicidality).  This APRN has discussed that a very slow dose titration when starting, or changing doses, of Lamictal may reduce the incidence of skin rash and other side effects.  The dosage should not be titrated upwards or increased faster than recommended due to the possibility of the discussed side effects and risk of development of a skin rash (which can become life threatening).    This APRN has also discussed that if the patient stops taking the Lamictal for 5 days or longer, it will be necessary to restart the drug with an initial dose titration, as rashes have been reported on reexposure.  If the patient/guardian and Provider decide to stop the Lamictal, the patient/guardian will follow the directions of this APRN/this office as a guided taper over about two weeks is appropriate due to the risk of relapse in bipolar disorder with those with bipolar disorder, the risk of seizures in those with epilepsy, and discontinuation symptoms upon rapid discontinuation of Lamictal.    The patient/guardian verbalizes understanding of benefits and risks as discussed, the patient/guardian feels the benefits outweigh the risks and is agreeable to continue/take Lamictal as discussed.  The patient/guardian is advised should any side effects or rash develops they are to stop the Lamictal immediately and contact this APRN/this office or go to the  emergency department immediately.  The patient/guardian verbalizes understanding and agreement with treatment plan in their own words.                        [facility] No Show Policy:  We understand unexpected circumstances arise; however, anytime you miss your appointment we are unable to provide you appropriate care.  In addition, each appointment missed could have been used to provide care for others.  We ask that you call at least 24 hours in advance to cancel or reschedule an appointment.  We would like to take this opportunity to remind you of our policy stating patients who miss THREE or more appointments without cancelling or rescheduling 24 hours in advance of the appointment may be subject to cancellation of any further visits with our clinic and recommendation to seek in-person services/visits.    Please call [phone] to reschedule your appointment. If there are reasons that make it difficult for you to keep the appointments, please call and let us know how we can help.  Please understand that medication prescribing will not continue without seeing your provider.      [facility]'s No Show Policy reviewed with patient at today's visit. Patient verbalized understanding of this policy. Discussed with patient that in the event that there are three or more no show visits, it will be recommended that they pursue in-person services/visits as noncompliance with telehealth visits indicates that patient is not an appropriate candidate for telemedicine and would likely be more appropriate for in-person services/visits. Patient verbalizes understanding and is agreeable to this.             MEDS ORDERED DURING VISIT:  New Medications Ordered This Visit   Medications   • buPROPion XL (WELLBUTRIN XL) 150 MG 24 hr tablet     Sig: Take 1 tablet by mouth Daily.     Dispense:  30 tablet     Refill:  0   • escitalopram (LEXAPRO) 20 MG tablet     Sig: Take 1 tablet by mouth  Every Morning.     Dispense:  90 tablet     Refill:  0       Return in about 4 weeks (around 2/21/2022), or if symptoms worsen or fail to improve, for Recheck.        Patient will follow-up in 4 weeks, highly encouraged the patient if she had any questions or concerns to contact the behavioral health [facility] clinic for sooner appointment patient verbalized understanding.      Functional Status: Moderate impairment     Prognosis: Guarded with Ongoing Treatment             This document has been electronically signed by VIVIANE Dash  January 24, 2022 15:08 EST    Part of this note may be an electronic transcription/translation of spoken language to printed text using the Dragon Dictation System.

## 2022-01-27 ENCOUNTER — TELEPHONE (OUTPATIENT)
Dept: PSYCHIATRY | Facility: CLINIC | Age: 27
End: 2022-01-27

## 2022-02-19 DIAGNOSIS — F33.0 MILD EPISODE OF RECURRENT MAJOR DEPRESSIVE DISORDER: ICD-10-CM

## 2022-02-19 DIAGNOSIS — F41.1 GENERALIZED ANXIETY DISORDER: ICD-10-CM

## 2022-02-21 RX ORDER — BUPROPION HYDROCHLORIDE 150 MG/1
TABLET ORAL
Qty: 30 TABLET | Refills: 0 | Status: SHIPPED | OUTPATIENT
Start: 2022-02-21 | End: 2022-03-07

## 2022-03-07 ENCOUNTER — TELEMEDICINE (OUTPATIENT)
Dept: PSYCHIATRY | Facility: CLINIC | Age: 27
End: 2022-03-07

## 2022-03-07 DIAGNOSIS — F33.0 MILD EPISODE OF RECURRENT MAJOR DEPRESSIVE DISORDER: Primary | Chronic | ICD-10-CM

## 2022-03-07 DIAGNOSIS — G47.9 SLEEPING DIFFICULTIES: ICD-10-CM

## 2022-03-07 DIAGNOSIS — F90.2 ATTENTION DEFICIT HYPERACTIVITY DISORDER, COMBINED TYPE: Chronic | ICD-10-CM

## 2022-03-07 DIAGNOSIS — F41.1 GENERALIZED ANXIETY DISORDER: Chronic | ICD-10-CM

## 2022-03-07 PROCEDURE — 99214 OFFICE O/P EST MOD 30 MIN: CPT | Performed by: NURSE PRACTITIONER

## 2022-03-07 RX ORDER — ATOMOXETINE 25 MG/1
25 CAPSULE ORAL DAILY
Qty: 30 CAPSULE | Refills: 0 | Status: SHIPPED | OUTPATIENT
Start: 2022-03-07 | End: 2022-04-04 | Stop reason: SDUPTHER

## 2022-03-07 RX ORDER — LAMOTRIGINE 200 MG/1
200 TABLET ORAL EVERY MORNING
Qty: 90 TABLET | Refills: 0 | Status: SHIPPED | OUTPATIENT
Start: 2022-03-07 | End: 2022-04-04 | Stop reason: SDUPTHER

## 2022-03-07 NOTE — PROGRESS NOTES
"This provider is located at the [facility] (through [facility]), [address], [facility] [place], [address] using a secure [employer] Video Visit through [employer]. Patient is being seen remotely via telehealth at their home address in [place], and stated they are in a secure environment for this session. The patient's condition being diagnosed/treated is appropriate for telemedicine. The provider identified herself as well as her credentials. The patient, and/or patients guardian, consent to be seen remotely, and when consent is given they understand that the consent allows for patient identifiable information to be sent to a third party as needed. They may refuse to be seen remotely at any time. The electronic data is encrypted and password protected, and the patient and/or guardian has been advised of the potential risks to privacy not withstanding such measures.    You have chosen to receive care through a telehealth visit.  Do you consent to use a video/audio connection for your medical care today? Yes     Subjective   Isabell Oakes is a 26 y.o. female who is here today for medication management follow up.    Chief Complaint: ADHD, depression, anxiety, sleeping difficulties    History of Present Illness:  The patient describes her mood as \"okay\" over the last few weeks.  The patient states that overall she has been doing okay for the past few weeks, but states that her anxiety has been significantly increased since beginning Wellbutrin XL.  Patient states that she had an anxiety attack last week, and endorses that she is to have 1 of these in over 2 years now.  The patient states that her circumstances have been overwhelming recently and this has resulted in some stress, but endorses that prior to beginning Wellbutrin XL she had tolerated this well and had not resulted in a significant increase in anxiety.  The patient states that overall she just feels \"more on edge\" " "and \"doubtful\" of her abilities.  The patient states that she also has not noticed any improvement in ADHD symptoms with beginning Wellbutrin XL.  The patient reports her appetite as good.  The patient reports her sleep as fluctuating.  The patient states that she has been falling asleep a little easier recently, but states that she has continued to have some difficulty with nighttime awakening.  The patient denies any nightmares or bad dreams.  The patient denies any new medical problems or changes in medications since last appointment with this facility.  The patient reports compliance with current medication regimen.  The patient denies any abnormal muscle movements or tics.  The patient rates her ADHD at a 8/10 on a 0-10 scale, with 10 being the worst.  The patient rates her depression at a 2/10 on a 0-10 scale, with 10 being the worst.  The patient rates her anxiety at a 2-3/10 on a 0-10 scale, with 10 being the worst.  The patient states that normally her anxiety is 2-3/10 on a 0-10 scale with 10 being the worst, which she states is where she would have rated her anxiety prior to starting the Wellbutrin XL.  The patient rates hopelessness at a 0/10 on a 0-10 scale with 10 being the worst.  The patient denies suicidal ideations and homicidal ideations, and is convincing.  The patient denies any auditory hallucinations or visual hallucinations.  The patient does not endorse significant symptoms consistent with bipolar disorder or a psychotic illness.            LMP:  States that she doesn't have menstrual cycles due to her birth control.  The patient denies any chance of pregnancy at this time.  The patient was educated that her prescribed medications can have potential risk to a developing fetus. The patient is advised to contact this APRN/this office if she becomes pregnant or plans to become pregnant.  Pt verbalizes understanding and acknowledged agreement with this plan in her own words.        Prior " "Psychiatric Medications:  Zoloft - reports she was on this medication in early 2012 before beginning Lexapro.  Reports initially it was effective for depression and anxiety, but endorses that eventually it lost efficacy and she is switched to Lexapro.  Vraylar - reports she was trialed on this medication in March 2022 after having a severe exacerbation of anxiety.  States that it was added to her current psychiatric medication regimen.  States this medication \"messed me up\".  Reports anxiety was significantly increased after beginning this medication and endorses that she felt \"paranoid and scared to drive or leave my room\".  Reports that after approximately 2 to 3 weeks the medication was discontinued and the severe exacerbation of anxiety and \"paranoia\" that she experienced after beginning medication immediately resolved.  Latuda - reports this medication was tried with his medication, but endorses that she cannot recall how long ago this was.  Reports that it was added to her current psychiatric medication regimen.  Reports that it made her \"feel weird\" and caused excessive sedation and GI side effects.  Reports that it was ineffective and discontinued.  Wellbutrin  mg daily - ineffective for ADHD and increased anxiety       The following portions of the patient's history were reviewed and updated as appropriate: allergies, current medications, past family history, past medical history, past social history, past surgical history and problem list.    Review of Systems   Constitutional: Negative for activity change, appetite change, fatigue and unexpected weight change.   Psychiatric/Behavioral: Positive for decreased concentration, dysphoric mood and sleep disturbance. Negative for agitation, behavioral problems, confusion, hallucinations, self-injury and suicidal ideas. The patient is nervous/anxious and is hyperactive.        Objective   Physical Exam  Constitutional:       Appearance: Normal appearance. "   Neurological:      Mental Status: She is alert.   Psychiatric:         Attention and Perception: Perception normal. She is inattentive.         Mood and Affect: Affect normal. Mood is anxious.         Speech: Speech normal.         Behavior: Behavior normal. Behavior is cooperative.         Thought Content: Thought content normal. Thought content does not include homicidal or suicidal ideation. Thought content does not include homicidal or suicidal plan.         Cognition and Memory: Cognition and memory normal.         Judgment: Judgment normal.       There were no vitals taken for this visit.    The patient was seen remotely today via a MyChart Video Visit through [facility].  Unable to obtain vital signs due to nature of remote visit.  Height stated at 67 inches.  Weight stated at 217 pounds.     No Known Allergies    Recent Results (from the past 2016 hour(s))   Comprehensive Metabolic Panel    Collection Time: 01/17/22  1:44 PM    Specimen: Blood   Result Value Ref Range    Glucose 94 65 - 99 mg/dL    BUN 11 6 - 20 mg/dL    Creatinine 0.85 0.57 - 1.00 mg/dL    Sodium 136 136 - 145 mmol/L    Potassium 4.4 3.5 - 5.2 mmol/L    Chloride 99 98 - 107 mmol/L    CO2 24.2 22.0 - 29.0 mmol/L    Calcium 9.7 8.6 - 10.5 mg/dL    Total Protein 7.4 6.0 - 8.5 g/dL    Albumin 4.60 3.50 - 5.20 g/dL    ALT (SGPT) 111 (H) 1 - 33 U/L    AST (SGOT) 83 (H) 1 - 32 U/L    Alkaline Phosphatase 104 39 - 117 U/L    Total Bilirubin 0.5 0.0 - 1.2 mg/dL    eGFR Non African Amer 81 >60 mL/min/1.73    Globulin 2.8 gm/dL    A/G Ratio 1.6 g/dL    BUN/Creatinine Ratio 12.9 7.0 - 25.0    Anion Gap 12.8 5.0 - 15.0 mmol/L   Lipid Panel    Collection Time: 01/17/22  1:44 PM    Specimen: Blood   Result Value Ref Range    Total Cholesterol 236 (H) 0 - 200 mg/dL    Triglycerides 210 (H) 0 - 150 mg/dL    HDL Cholesterol 39 (L) 40 - 60 mg/dL    LDL Cholesterol  158 (H) 0 - 100 mg/dL    VLDL Cholesterol 39 5 - 40 mg/dL    LDL/HDL Ratio 3.97    TSH Rfx On  Abnormal To Free T4    Collection Time: 01/17/22  1:44 PM    Specimen: Blood   Result Value Ref Range    TSH 1.490 0.270 - 4.200 uIU/mL   QuantiFERON TB Plus Client Incubated    Collection Time: 01/17/22  1:44 PM    Specimen: Blood   Result Value Ref Range    QuantiFERON Criteria Comment     QUANTIFERON TB1 AG VALUE 0.01 IU/mL    QUANTIFERON TB2 AG VALUE 0.00 IU/mL    QuantiFERON Nil Value 0.00 IU/mL    QuantiFERON Mitogen Value >10.00 IU/mL    QUANTIFERON-TB GOLD PLUS Negative Negative   CBC Auto Differential    Collection Time: 01/17/22  1:44 PM    Specimen: Blood   Result Value Ref Range    WBC 8.14 3.40 - 10.80 10*3/mm3    RBC 4.98 3.77 - 5.28 10*6/mm3    Hemoglobin 13.9 12.0 - 15.9 g/dL    Hematocrit 43.3 34.0 - 46.6 %    MCV 86.9 79.0 - 97.0 fL    MCH 27.9 26.6 - 33.0 pg    MCHC 32.1 31.5 - 35.7 g/dL    RDW 13.3 12.3 - 15.4 %    RDW-SD 41.9 37.0 - 54.0 fl    MPV 11.0 6.0 - 12.0 fL    Platelets 358 140 - 450 10*3/mm3    Neutrophil % 54.2 42.7 - 76.0 %    Lymphocyte % 37.5 19.6 - 45.3 %    Monocyte % 5.3 5.0 - 12.0 %    Eosinophil % 2.1 0.3 - 6.2 %    Basophil % 0.7 0.0 - 1.5 %    Immature Grans % 0.2 0.0 - 0.5 %    Neutrophils, Absolute 4.41 1.70 - 7.00 10*3/mm3    Lymphocytes, Absolute 3.05 0.70 - 3.10 10*3/mm3    Monocytes, Absolute 0.43 0.10 - 0.90 10*3/mm3    Eosinophils, Absolute 0.17 0.00 - 0.40 10*3/mm3    Basophils, Absolute 0.06 0.00 - 0.20 10*3/mm3    Immature Grans, Absolute 0.02 0.00 - 0.05 10*3/mm3    nRBC 0.0 0.0 - 0.2 /100 WBC   POC Glycosylated Hemoglobin (Hb A1C)    Collection Time: 01/19/22  8:30 AM    Specimen: Blood   Result Value Ref Range    Hemoglobin A1C 5.6 4.8 - 5.6 %    Lot Number [id number]     Expiration Date 05/19/2023        Current Medications:   Current Outpatient Medications   Medication Sig Dispense Refill   • escitalopram (LEXAPRO) 20 MG tablet Take 1 tablet by mouth Every Morning. 90 tablet 0   • Tesas 1.5/30 1.5-30 MG-MCG tablet TAKE ONE TABLET BY MOUTH DAILY 1 each 5   •  hydroCHLOROthiazide (HYDRODIURIL) 25 MG tablet Take 1 tablet by mouth Daily. 90 tablet 1   • lamoTRIgine (LaMICtal) 200 MG tablet Take 1 tablet by mouth Every Morning. 90 tablet 0   • levothyroxine (SYNTHROID, LEVOTHROID) 75 MCG tablet TAKE ONE TABLET BY MOUTH DAILY 90 tablet 1   • metFORMIN (GLUCOPHAGE) 500 MG tablet TAKE ONE TABLET BY MOUTH DAILY 90 tablet 1   • Tremfya 100 MG/ML solution pen-injector Every 3 (Three) Months.     • atomoxetine (STRATTERA) 25 MG capsule Take 1 capsule by mouth Daily. 30 capsule 0     No current facility-administered medications for this visit.       Mental Status Exam:   Hygiene:   good  Cooperation:  Cooperative  Eye Contact:  Good  Psychomotor Behavior:  Appropriate  Affect:  Full range  Hopelessness: Denies  Speech:  Normal  Thought Process:  Goal directed  Thought Content:  Normal  Suicidal:  None  Homicidal:  None  Hallucinations:  None  Delusion:  None  Memory:  Intact  Orientation:  Person, Place, Time and Situation  Reliability:  good  Insight:  Good  Judgement:  Good  Impulse Control:  Good  Physical/Medical Issues:  Yes See medical history    PHQ-9 Depression Screening  Little interest or pleasure in doing things? (P) 1   Feeling down, depressed, or hopeless? (P) 0   Trouble falling or staying asleep, or sleeping too much? (P) 2   Feeling tired or having little energy? (P) 2   Poor appetite or overeating? (P) 0   Feeling bad about yourself - or that you are a failure or have let yourself or your family down? (P) 2   Trouble concentrating on things, such as reading the newspaper or watching television? (P) 3   Moving or speaking so slowly that other people could have noticed? Or the opposite - being so fidgety or restless that you have been moving around a lot more than usual? (P) 2   Thoughts that you would be better off dead, or of hurting yourself in some way? (P) 0   PHQ-9 Total Score (P) 12   If you checked off any problems, how difficult have these problems made it  for you to do your work, take care of things at home, or get along with other people? (P) Very difficult        PHQ-Score Total:  PHQ-9 Total Score: (P) 12    MILA-7  Feeling nervous, anxious or on edge: (P) Several days  Not being able to stop or control worrying: (P) Several days  Worrying too much about different things: (P) Several days  Trouble Relaxing: (P) Several days  Being so restless that it is hard to sit still: (P) Several days  Feeling afraid as if something awful might happen: (P) Not at all  Becoming easily annoyed or irritable: (P) Several days  MILA 7 Total Score: (P) 6  If you checked any problems, how difficult have these problems made it for you to do your work, take care of things at home, or get along with other people: (P) Somewhat difficult        PROMIS scale screening tool that patient filled out virtually reviewed by this APRN at today's encounter.       Assessment/Plan   Diagnoses and all orders for this visit:    1. Mild episode of recurrent major depressive disorder (HCC) (Primary)  -     atomoxetine (STRATTERA) 25 MG capsule; Take 1 capsule by mouth Daily.  Dispense: 30 capsule; Refill: 0  -     lamoTRIgine (LaMICtal) 200 MG tablet; Take 1 tablet by mouth Every Morning.  Dispense: 90 tablet; Refill: 0  -     Ambulatory Referral to Psychiatry    2. Generalized anxiety disorder  -     atomoxetine (STRATTERA) 25 MG capsule; Take 1 capsule by mouth Daily.  Dispense: 30 capsule; Refill: 0  -     lamoTRIgine (LaMICtal) 200 MG tablet; Take 1 tablet by mouth Every Morning.  Dispense: 90 tablet; Refill: 0  -     Ambulatory Referral to Psychiatry    3. Attention deficit hyperactivity disorder, combined type  -     atomoxetine (STRATTERA) 25 MG capsule; Take 1 capsule by mouth Daily.  Dispense: 30 capsule; Refill: 0  -     Ambulatory Referral to Psychiatry    4. Sleeping difficulties            Visit Diagnoses:    ICD-10-CM ICD-9-CM   1. Mild episode of recurrent major depressive disorder (HCC)   F33.0 296.31   2. Generalized anxiety disorder  F41.1 300.02   3. Attention deficit hyperactivity disorder, combined type  F90.2 314.01   4. Sleeping difficulties  G47.9 780.50       GOALS:  Short Term Goals: Patient will be compliant with medication, and patient will have no significant medication related side effects.  Patient will be engaged in psychotherapy as indicated.  Patient will report subjective improvement of symptoms.  Long term goals: To stabilize mood and treat/improve subjective symptoms, the patient will stay out of the hospital, the patient will be at an optimal level of functioning, and the patient will take all medications as prescribed.  The patient verbalized understanding and agreement with goals that were mutually set.      SUICIDE RISK ASSESSMENT: Unalterable demographics and a history of mental health intervention indicate this patient is in a high risk category compared to the general population. At present, the patient denies active SI/HI, intentions, or plans at this time and agrees to seek immediate care should such thoughts develop. The patient verbalizes understanding of how to access emergency care if needed and agrees to do so. Consideration of suicide risk and protective factors such as history, current presentation, individual strengths and weaknesses, psychosocial and environmental stressors and variables, psychiatric illness and symptoms, medical conditions and pain, took place in this interview. Based on those considerations, the patient is determined: within individual baseline and presenting no imminent risk for suicide or homicide. Other recommendations: The patient does not meet the criteria for inpatient admission and is not a safety risk to self or others at today's visit. Inpatient treatment offers no significant advantages over outpatient treatment for this patient at today's visit.      SAFETY PLAN:  Patient was given ample time for questions and fully participated in  treatment planning.  Patient was encouraged to call the clinic with any questions or concerns.  Patient was informed of access to emergency care. If patient were to develop any significant symptomatology, suicidal ideation, homicidal ideation, any concerns, or feel unsafe at any time they are to call the clinic and if unable to get immediate assistance should immediately call 911 or go to the nearest emergency room.  The patient is advised to remove or secure (lock away) all lethal weapons (including guns) and sharps (including razors, scissors, knives, etc.).  All medications (including any prescribed and any over the counter medications) should be stored in a safe and secured location that is not obtainable by children/adolescents.  Patient was given an opportunity and encouraged to ask questions about their medication, illness, and treatment. Patient contracted verbally for the following: If you are experiencing an emotional crisis or have thoughts of harming yourself or others, please go to your nearest local emergency room or call 911. Will continue to re-assess medication response and side effects frequently to establish efficacy and ensure safety. Risks, any black box warnings, side effects, off label usage, and benefits of medication and treatment discussed with patient, along with potential adverse side effects of current and/or newly prescribed medication, alternative treatment options, and OTC medications.  Patient verbalized understanding of potential risks, any off label use of medication, any black box warnings, and any side effects in their own words. The patient verbalized understanding and agreed to comply with the safety plan discussed in their own words.  Patient given the number to the office. Number also available to the 24- hour suicide hotline.       TREATMENT PLAN/GOALS: Continue medications and treatment plan as indicated. Treatment and medication options discussed during today's visit.  Patient acknowledged and verbally consented to continue with current treatment plan and was educated on the importance of compliance with treatment and follow-up appointments.        -Discontinue Wellbutrin  mg daily due to causing increased anxiety   -Begin Staterra 25 mg daily for ADHD and as adjunct for anxiety/depression  -Continue Lexapro 20 mg daily for depression and anxiety  -Continue Lamictal 200 mg daily as adjunct for depression and anxiety.  Discussed the patient that while this medication is generally used to treat bipolar/mood disorders and address mood instability, this medication can also be of benefit for managing anxiety and depression.  Discussed with patient that since it has been effective thus far for management of her anxiety and depression we will continue at this time.  Discussed with patient that if anxiety and depression continue to be well managed with initiating ADHD treatment/medication and mood remains stable, we will potentially discuss tapering off the Lamictal.  The patient verbalizes understanding and endorses that she is agreeable to this.  -Begin psychotherapy per patient's request.  Referral placed at this time to begin therapy with the [facility].        MEDICATION ISSUES: Discussed medication options and treatment plan of prescribed medication, any off label use of medication, as well as the risks, benefits, any black box warnings including increased suicidality, and side effects including but not limited to potential falls, dizziness, possible impaired driving, GI side effects (change in appetite, abdominal discomfort, nausea, vomiting, diarrhea, and/or constipation), dry mouth, somnolence, sedation, insomnia, activation, agitation, irritation, tremors, abnormal muscle movements or disorders, headache, sweating, possible bruising or rare bleeding, electrolyte and/or fluid abnormalities, change in blood pressure/heart rate/and or heart rhythm,  sexual dysfunction, and metabolic adversities among others. Patient and/or guardian agreeable to call the office with any worsening of symptoms or onset of side effects, or if any concerns or questions arise.  The contact information for the office is made available to the patient and/or guardian.  Patient and/or guardian agreeable to call 911 or go to the nearest ER should they begin having any SI/HI, or if any urgent concerns arise. No medication side effects or related complaints today.    This APRN has discussed with the patient/guardian about the possibility of serotonin syndrome when certain medications are taken together, as is the case with this patient.  This APRN has provided the patient/guardian with a list of symptoms of serotonin syndrome including symptoms of autonomic instability, altered sensorium, confusion, restlessness, agitation, myoclonus, hyperreflexia, hyperthermia, diaphoresis, tremor, chills, diarrhea and cramps, ataxia, headache, migraines, seizures, and insomnia; which could lead to permanent hyperthermic brain damage, cardiovascular collapse, coma, or even death.  The patient/guardian are instructed to stop medications immediately and either contact this APRN/this office during regular office hours, or go to the emergency department/call 911, if they begin to experience any of the symptoms discussed.  The benefits and risks of the current medication regimen are discussed with the patient/guardian, and they feel that the benefits out weigh the risks.  The patient/guardian verbalized understanding and agreement in their own words.    This APRN has discussed the benefits and risks of taking Lamictal (Lamotrigine).  The side effects of Lamictal can include a benign rash, blurred or double vision, dizziness, ataxia, sedation, headache, tremor, insomnia, poor coordination, fatigue,  nausea, vomiting, dyspepsia, rhinitis, infection, pharyngitis, asthenia, a rare but serious rash, rare  multi-organ failure associated with Moraes-Juan F Syndrome, toxic epidermal necrolysis, drug hypersensitivity syndrome, rare blood dyscrasias, rare aseptic meningitis, rare sudden unexplained deaths in people with epilepsy, withdrawal seizures upon abrupt withdrawal, and rare activation of suicidal ideation and behavior (suicidality).  This APRN has discussed that a very slow dose titration when starting, or changing doses, of Lamictal may reduce the incidence of skin rash and other side effects.  The dosage should not be titrated upwards or increased faster than recommended due to the possibility of the discussed side effects and risk of development of a skin rash (which can become life threatening).    This APRN has also discussed that if the patient stops taking the Lamictal for 5 days or longer, it will be necessary to restart the drug with an initial dose titration, as rashes have been reported on reexposure.  If the patient/guardian and Provider decide to stop the Lamictal, the patient/guardian will follow the directions of this APRN/this office as a guided taper over about two weeks is appropriate due to the risk of relapse in bipolar disorder with those with bipolar disorder, the risk of seizures in those with epilepsy, and discontinuation symptoms upon rapid discontinuation of Lamictal.    The patient/guardian verbalizes understanding of benefits and risks as discussed, the patient/guardian feels the benefits outweigh the risks and is agreeable to continue/take Lamictal as discussed.  The patient/guardian is advised should any side effects or rash develops they are to stop the Lamictal immediately and contact this APRN/this office or go to the emergency department immediately.  The patient/guardian verbalizes understanding and agreement with treatment plan in their own words.                  [facility] No Show Policy:  We understand unexpected circumstances arise; however, anytime  you miss your appointment we are unable to provide you appropriate care.  In addition, each appointment missed could have been used to provide care for others.  We ask that you call at least 24 hours in advance to cancel or reschedule an appointment.  We would like to take this opportunity to remind you of our policy stating patients who miss THREE or more appointments without cancelling or rescheduling 24 hours in advance of the appointment may be subject to cancellation of any further visits with our clinic and recommendation to seek in-person services/visits.    Please call [phone] to reschedule your appointment. If there are reasons that make it difficult for you to keep the appointments, please call and let us know how we can help.  Please understand that medication prescribing will not continue without seeing your provider.      [facility]'s No Show Policy reviewed with patient at today's visit. Patient verbalized understanding of this policy. Discussed with patient that in the event that there are three or more no show visits, it will be recommended that they pursue in-person services/visits as noncompliance with telehealth visits indicates that patient is not an appropriate candidate for telemedicine and would likely be more appropriate for in-person services/visits. Patient verbalizes understanding and is agreeable to this.           MEDS ORDERED DURING VISIT:  New Medications Ordered This Visit   Medications   • atomoxetine (STRATTERA) 25 MG capsule     Sig: Take 1 capsule by mouth Daily.     Dispense:  30 capsule     Refill:  0   • lamoTRIgine (LaMICtal) 200 MG tablet     Sig: Take 1 tablet by mouth Every Morning.     Dispense:  90 tablet     Refill:  0       Return in about 4 weeks (around 4/4/2022), or if symptoms worsen or fail to improve, for Recheck.        Patient will follow-up in 4 weeks, highly encouraged the patient if she had any questions or concerns to contact the  behavioral health [facility] clinic for sooner appointment patient verbalized understanding.      Functional Status: Moderate impairment     Prognosis: Guarded with Ongoing Treatment            This document has been electronically signed by VIVIANE Dash  March 7, 2022 14:04 EST      Some of the data in this electronic note has been brought forward from a previous encounter, any necessary changes have been made, it has been reviewed by this APRN, and it is accurate.       Part of this note may be an electronic transcription/translation of spoken language to printed text using the Dragon Dictation System.

## 2022-03-14 RX ORDER — LEVOTHYROXINE SODIUM 0.07 MG/1
TABLET ORAL
Qty: 90 TABLET | Refills: 1 | Status: SHIPPED | OUTPATIENT
Start: 2022-03-14 | End: 2022-09-08

## 2022-04-01 ENCOUNTER — OFFICE VISIT (OUTPATIENT)
Dept: INTERNAL MEDICINE | Facility: CLINIC | Age: 27
End: 2022-04-01

## 2022-04-01 VITALS
WEIGHT: 214 LBS | HEIGHT: 67 IN | DIASTOLIC BLOOD PRESSURE: 76 MMHG | HEART RATE: 84 BPM | BODY MASS INDEX: 33.59 KG/M2 | SYSTOLIC BLOOD PRESSURE: 112 MMHG | TEMPERATURE: 98.2 F

## 2022-04-01 DIAGNOSIS — E66.9 OBESITY (BMI 30.0-34.9): ICD-10-CM

## 2022-04-01 DIAGNOSIS — R79.89 ELEVATED LIVER FUNCTION TESTS: ICD-10-CM

## 2022-04-01 DIAGNOSIS — I10 BENIGN ESSENTIAL HYPERTENSION: Primary | ICD-10-CM

## 2022-04-01 DIAGNOSIS — E11.9 TYPE 2 DIABETES MELLITUS WITHOUT COMPLICATION, WITHOUT LONG-TERM CURRENT USE OF INSULIN: ICD-10-CM

## 2022-04-01 PROBLEM — R05.9 COUGH: Status: RESOLVED | Noted: 2021-09-09 | Resolved: 2022-04-01

## 2022-04-01 PROCEDURE — 99214 OFFICE O/P EST MOD 30 MIN: CPT | Performed by: NURSE PRACTITIONER

## 2022-04-01 NOTE — PROGRESS NOTES
Isabell Oakes  1995  [id number]  Patient Care Team:  Yamilex Khan APRN as PCP - General (Internal Medicine)    Isabell Oakes is a pleasant 26 y.o. female who presents for evaluation of Diabetes and Hypertension    Chief Complaint   Patient presents with   • Diabetes   • Hypertension       HPI:   Juanjo has a pertinent medical history including prediabetes, hypothyroidism, hypertension, hyperlipidemia, morbid obesity, and psoriasis. She is a .  She had Covid 19 in Dec 2021.  She sees mental health provider at [facility], [place].    She has been checking her blood glucose a few times a week and notes they were under 120 mg/dL with the most recent reading being 99 mg/dL. She is currently taking metformin.     She has not been checking her blood pressure regularly because she has not been experiencing any dizziness or fatigue. She feels like her blood pressure is normal because she notes her heart rate is normal when she exercises. She has been exercising approximately 3-4 times a week. Her blood pressure was 112/76 mm/Hg on today's check. She is currently taking hydrochlorothiazide 25 mg every morning.     She explains her dermatologist has been trying to reach her because her liver levels were high during her recent labs. She is currently on Tremfya and explains that there is no correlation between the medicine and her liver levels so she was unsure why she was being contacted.     She denies any edema of the bilateral legs.    Past Medical History:   Diagnosis Date   • Anxiety    • Bipolar disorder (HCC)    • Depression    • Diabetes mellitus type II, controlled (HCC)    • Disease of thyroid gland    • Hypertension      Past Surgical History:   Procedure Laterality Date   • TONSILLECTOMY  2001     Family History   Problem Relation Age of Onset   • Hypertension Mother    • Bipolar disorder Father    • Depression Father    • Arthritis Maternal Grandmother    • Depression  "Maternal Grandmother    • Hypertension Maternal Grandmother    • Hyperlipidemia Maternal Grandmother    • Cancer Maternal Grandfather         melanoma?   • Arthritis Paternal Grandmother    • Depression Paternal Grandmother    • Hypertension Paternal Grandmother    • Cancer Paternal Grandfather         lung (smoker)   • Heart disease Paternal Grandfather      Social History     Tobacco Use   Smoking Status Never Smoker   Smokeless Tobacco Never Used     No Known Allergies    Current Outpatient Medications:   •  atomoxetine (STRATTERA) 25 MG capsule, Take 1 capsule by mouth Daily., Disp: 30 capsule, Rfl: 0  •  escitalopram (LEXAPRO) 20 MG tablet, Take 1 tablet by mouth Every Morning., Disp: 90 tablet, Rfl: 0  •  Tessa 1.5/30 1.5-30 MG-MCG tablet, TAKE ONE TABLET BY MOUTH DAILY, Disp: 1 each, Rfl: 5  •  hydroCHLOROthiazide (HYDRODIURIL) 25 MG tablet, Take 1 tablet by mouth Daily., Disp: 90 tablet, Rfl: 1  •  lamoTRIgine (LaMICtal) 200 MG tablet, Take 1 tablet by mouth Every Morning., Disp: 90 tablet, Rfl: 0  •  levothyroxine (SYNTHROID, LEVOTHROID) 75 MCG tablet, TAKE ONE TABLET BY MOUTH DAILY, Disp: 90 tablet, Rfl: 1  •  metFORMIN (GLUCOPHAGE) 500 MG tablet, TAKE ONE TABLET BY MOUTH DAILY, Disp: 90 tablet, Rfl: 1  •  Tremfya 100 MG/ML solution pen-injector, Every 3 (Three) Months., Disp: , Rfl:     Review of Systems  Review of systems was completed, and pertinent findings are noted in the HPI.  /76 (BP Location: Left arm, Patient Position: Sitting, Cuff Size: Adult)   Pulse 84   Temp 98.2 °F (36.8 °C) (Temporal)   Ht 170 cm (66.93\")   Wt 97.1 kg (214 lb)   BMI 33.59 kg/m²     Physical Exam  Vitals and nursing note reviewed.   Constitutional:       Appearance: She is well-developed.   HENT:      Head: Normocephalic.   Eyes:      Conjunctiva/sclera: Conjunctivae normal.      Pupils: Pupils are equal, round, and reactive to light.   Neck:      Thyroid: No thyromegaly.   Cardiovascular:      Rate and Rhythm: " Normal rate and regular rhythm.      Heart sounds: Normal heart sounds.   Pulmonary:      Effort: Pulmonary effort is normal.      Breath sounds: Normal breath sounds. No wheezing.   Musculoskeletal:         General: Normal range of motion.      Cervical back: Normal range of motion and neck supple.   Lymphadenopathy:      Cervical: No cervical adenopathy.   Skin:     General: Skin is warm and dry.   Neurological:      Mental Status: She is alert and oriented to person, place, and time.   Psychiatric:         Thought Content: Thought content normal.         Procedures    PHQ-9 Total Score:      Assessment/Plan:  Diagnoses and all orders for this visit:    1. Benign essential hypertension (Primary)  Continue current medications    2. Elevated liver function tests  Recheck labs today    3. Diabetes  Patient asked if she would be able to discontinue the metformin but I explained to her that the metformin is for diabetes prevention as well as treating diabetes. I want the patient to continue checking her blood glucose regularly. She will have an A1c check performed next month. Her labs will need to be performed as well but not until after 04/19/2022.  Continue visits with LYUDMILA Friedman nutritional counseling.     There are no Patient Instructions on file for this visit.  Plan of care reviewed with patient at the conclusion of today's visit. Education was provided regarding diagnosis, management and any prescribed or recommended OTC medications.  Patient verbalizes understanding of and agreement with management plan.    Return in about 3 months (around 7/1/2022) for Next scheduled follow up.    Dictated Utilizing Dragon Dictation.  Transcribed from ambient dictation for VIVIANE Rowan by Darling Aguilera/Beti Deluca.  04/01/22   13:47 EDT    Patient verbalized consent to the visit recording.      VIVIANE Rowan          Answers for HPI/ROS submitted by the patient on 4/1/2022  What is the primary  reason for your visit?: Physical

## 2022-04-04 ENCOUNTER — TELEMEDICINE (OUTPATIENT)
Dept: PSYCHIATRY | Facility: CLINIC | Age: 27
End: 2022-04-04

## 2022-04-04 DIAGNOSIS — G47.9 SLEEPING DIFFICULTIES: ICD-10-CM

## 2022-04-04 DIAGNOSIS — F33.0 MILD EPISODE OF RECURRENT MAJOR DEPRESSIVE DISORDER: Chronic | ICD-10-CM

## 2022-04-04 DIAGNOSIS — F41.1 GENERALIZED ANXIETY DISORDER: Chronic | ICD-10-CM

## 2022-04-04 DIAGNOSIS — F90.2 ATTENTION DEFICIT HYPERACTIVITY DISORDER, COMBINED TYPE: Primary | Chronic | ICD-10-CM

## 2022-04-04 PROBLEM — E66.01 MORBID OBESITY: Status: RESOLVED | Noted: 2021-04-17 | Resolved: 2022-04-04

## 2022-04-04 PROBLEM — R73.03 PREDIABETES: Status: RESOLVED | Noted: 2021-06-21 | Resolved: 2022-04-04

## 2022-04-04 PROBLEM — E11.9 TYPE 2 DIABETES MELLITUS WITHOUT COMPLICATION, WITHOUT LONG-TERM CURRENT USE OF INSULIN: Status: ACTIVE | Noted: 2022-04-04

## 2022-04-04 PROCEDURE — 99214 OFFICE O/P EST MOD 30 MIN: CPT | Performed by: NURSE PRACTITIONER

## 2022-04-04 RX ORDER — ESCITALOPRAM OXALATE 20 MG/1
20 TABLET ORAL EVERY MORNING
Qty: 90 TABLET | Refills: 0 | Status: SHIPPED | OUTPATIENT
Start: 2022-04-04 | End: 2022-06-01 | Stop reason: SDUPTHER

## 2022-04-04 RX ORDER — LAMOTRIGINE 150 MG/1
150 TABLET ORAL EVERY MORNING
Qty: 30 TABLET | Refills: 0 | Status: SHIPPED | OUTPATIENT
Start: 2022-04-04 | End: 2022-05-02 | Stop reason: SDUPTHER

## 2022-04-04 RX ORDER — ATOMOXETINE 25 MG/1
25 CAPSULE ORAL DAILY
Qty: 30 CAPSULE | Refills: 0 | Status: SHIPPED | OUTPATIENT
Start: 2022-04-04 | End: 2022-05-02

## 2022-04-04 NOTE — PROGRESS NOTES
"This provider is located at the [facility] (through [facility]), [address], [facility], [address] using a secure [employer] Video Visit through [employer]. Patient is being seen remotely via telehealth at their home address in [place], and stated they are in a secure environment for this session. The patient's condition being diagnosed/treated is appropriate for telemedicine. The provider identified herself as well as her credentials. The patient, and/or patients guardian, consent to be seen remotely, and when consent is given they understand that the consent allows for patient identifiable information to be sent to a third party as needed. They may refuse to be seen remotely at any time. The electronic data is encrypted and password protected, and the patient and/or guardian has been advised of the potential risks to privacy not withstanding such measures.    You have chosen to receive care through a telehealth visit.  Do you consent to use a video/audio connection for your medical care today? Yes     Subjective   Isabell Oakes is a 26 y.o. female who is here today for medication management follow up.    Chief Complaint: ADHD, depression, anxiety, sleeping difficulties    History of Present Illness:  The patient describes her mood as \"good\" over the last few weeks.  The patient states that overall she has tolerated the Strattera well.  The patient states that she has not noticed any worsening of anxiety as she did with the Wellbutrin XL.  The patient states that anxiety and depression have continued to be well-controlled recently and actually seems somewhat improved, but states that ADHD symptoms have continued to be problematic.  Patient states that she has noticed some slight improvements in ADHD symptoms since beginning the Strattera, but states that she has not noticed any significant changes.  The patient states that initially she began taking the medication in " "the morning, but states that it caused her to \"feel weird\" so she switched to taking it at nighttime.  The patient states that she has a hard time describing how the Strattera made her feel when taking in the morning, but states that it made her feel \"almost like a zombie\" and as if she was \"floating\".  The patient states that she has tolerated the medication much better with taking it at nighttime and denies any of these symptoms since switching to nighttime dosing.  The patient states that she has noticed she is a little bit more tired throughout the day since beginning the Strattera, but states that other than that she has tolerated the medication well without side effects.  The patient states that she feels that she has not really struggled with depression and that she has really been having more anxiety and ADHD symptoms over the past several years and endorses that she was thinking that these were depressive symptoms.  The patient states that she would like to further address her ADHD symptoms at this time since her mood and anxiety are well controlled currently.  The patient reports her appetite as good.  The patient reports her sleep as fair.  The patient states that she has still had some difficulty with waking up in the night, but states that this is her normal.  Denies any worsening of sleeping difficulties recently.  The patient denies any nightmares or bad dreams.  The patient denies any new medical problems or changes in medications since last appointment with this facility.  The patient reports compliance with current medication regimen.  The patient denies any abnormal muscle movements or tics.  The patient rates her ADHD at a 6-7/10 on a 0-10 scale, with 10 being the worst.  The patient rates her depression at a 1-2/10 on a 0-10 scale, with 10 being the worst.  The patient rates her anxiety at a 1-2/10 on a 0-10 scale, with 10 being the worst.  The patient rates hopelessness at a 0/10 on a 0-10 " "scale with 10 being the worst.  The patient denies suicidal ideations and homicidal ideations, and is convincing.  The patient denies any auditory hallucinations or visual hallucinations.  The patient does not endorse significant symptoms consistent with bipolar disorder or a psychotic illness.                LMP:  States that she doesn't have menstrual cycles due to her birth control.  The patient denies any chance of pregnancy at this time.  The patient was educated that her prescribed medications can have potential risk to a developing fetus. The patient is advised to contact this APRN/this office if she becomes pregnant or plans to become pregnant.  Pt verbalizes understanding and acknowledged agreement with this plan in her own words.        Prior Psychiatric Medications:  Zoloft - reports she was on this medication in early 2012 before beginning Lexapro.  Reports initially it was effective for depression and anxiety, but endorses that eventually it lost efficacy and she is switched to Lexapro.  Vraylar - reports she was trialed on this medication in March 2022 after having a severe exacerbation of anxiety.  States that it was added to her current psychiatric medication regimen.  States this medication \"messed me up\".  Reports anxiety was significantly increased after beginning this medication and endorses that she felt \"paranoid and scared to drive or leave my room\".  Reports that after approximately 2 to 3 weeks the medication was discontinued and the severe exacerbation of anxiety and \"paranoia\" that she experienced after beginning medication immediately resolved.  Latuda - reports this medication was tried with his medication, but endorses that she cannot recall how long ago this was.  Reports that it was added to her current psychiatric medication regimen.  Reports that it made her \"feel weird\" and caused excessive sedation and GI side effects.  Reports that it was ineffective and discontinued.  Wellbutrin "  mg daily - ineffective for ADHD and increased anxiety       The following portions of the patient's history were reviewed and updated as appropriate: allergies, current medications, past family history, past medical history, past social history, past surgical history and problem list.    Review of Systems   Constitutional: Positive for fatigue. Negative for activity change, appetite change and unexpected weight change.   Psychiatric/Behavioral: Positive for decreased concentration, dysphoric mood and sleep disturbance. Negative for agitation, behavioral problems, confusion, hallucinations, self-injury and suicidal ideas. The patient is nervous/anxious and is hyperactive.        Objective   Physical Exam  Constitutional:       Appearance: Normal appearance.   Neurological:      Mental Status: She is alert.   Psychiatric:         Attention and Perception: Perception normal. She is inattentive.         Mood and Affect: Mood and affect normal.         Speech: Speech normal.         Behavior: Behavior normal. Behavior is cooperative.         Thought Content: Thought content normal. Thought content does not include homicidal or suicidal ideation. Thought content does not include homicidal or suicidal plan.         Cognition and Memory: Cognition and memory normal.         Judgment: Judgment normal.       There were no vitals taken for this visit.    The patient was seen remotely today via a MyChart Video Visit through [facility].  Unable to obtain vital signs due to nature of remote visit.  Height stated at 67 inches.  Weight stated at 214 pounds.     No Known Allergies    Recent Results (from the past 2016 hour(s))   Comprehensive Metabolic Panel    Collection Time: 01/17/22  1:44 PM    Specimen: Blood   Result Value Ref Range    Glucose 94 65 - 99 mg/dL    BUN 11 6 - 20 mg/dL    Creatinine 0.85 0.57 - 1.00 mg/dL    Sodium 136 136 - 145 mmol/L    Potassium 4.4 3.5 - 5.2 mmol/L    Chloride 99 98 - 107 mmol/L    CO2 24.2  22.0 - 29.0 mmol/L    Calcium 9.7 8.6 - 10.5 mg/dL    Total Protein 7.4 6.0 - 8.5 g/dL    Albumin 4.60 3.50 - 5.20 g/dL    ALT (SGPT) 111 (H) 1 - 33 U/L    AST (SGOT) 83 (H) 1 - 32 U/L    Alkaline Phosphatase 104 39 - 117 U/L    Total Bilirubin 0.5 0.0 - 1.2 mg/dL    eGFR Non African Amer 81 >60 mL/min/1.73    Globulin 2.8 gm/dL    A/G Ratio 1.6 g/dL    BUN/Creatinine Ratio 12.9 7.0 - 25.0    Anion Gap 12.8 5.0 - 15.0 mmol/L   Lipid Panel    Collection Time: 01/17/22  1:44 PM    Specimen: Blood   Result Value Ref Range    Total Cholesterol 236 (H) 0 - 200 mg/dL    Triglycerides 210 (H) 0 - 150 mg/dL    HDL Cholesterol 39 (L) 40 - 60 mg/dL    LDL Cholesterol  158 (H) 0 - 100 mg/dL    VLDL Cholesterol 39 5 - 40 mg/dL    LDL/HDL Ratio 3.97    TSH Rfx On Abnormal To Free T4    Collection Time: 01/17/22  1:44 PM    Specimen: Blood   Result Value Ref Range    TSH 1.490 0.270 - 4.200 uIU/mL   QuantiFERON TB Plus Client Incubated    Collection Time: 01/17/22  1:44 PM    Specimen: Blood   Result Value Ref Range    QuantiFERON Criteria Comment     QUANTIFERON TB1 AG VALUE 0.01 IU/mL    QUANTIFERON TB2 AG VALUE 0.00 IU/mL    QuantiFERON Nil Value 0.00 IU/mL    QuantiFERON Mitogen Value >10.00 IU/mL    QUANTIFERON-TB GOLD PLUS Negative Negative   CBC Auto Differential    Collection Time: 01/17/22  1:44 PM    Specimen: Blood   Result Value Ref Range    WBC 8.14 3.40 - 10.80 10*3/mm3    RBC 4.98 3.77 - 5.28 10*6/mm3    Hemoglobin 13.9 12.0 - 15.9 g/dL    Hematocrit 43.3 34.0 - 46.6 %    MCV 86.9 79.0 - 97.0 fL    MCH 27.9 26.6 - 33.0 pg    MCHC 32.1 31.5 - 35.7 g/dL    RDW 13.3 12.3 - 15.4 %    RDW-SD 41.9 37.0 - 54.0 fl    MPV 11.0 6.0 - 12.0 fL    Platelets 358 140 - 450 10*3/mm3    Neutrophil % 54.2 42.7 - 76.0 %    Lymphocyte % 37.5 19.6 - 45.3 %    Monocyte % 5.3 5.0 - 12.0 %    Eosinophil % 2.1 0.3 - 6.2 %    Basophil % 0.7 0.0 - 1.5 %    Immature Grans % 0.2 0.0 - 0.5 %    Neutrophils, Absolute 4.41 1.70 - 7.00 10*3/mm3     Lymphocytes, Absolute 3.05 0.70 - 3.10 10*3/mm3    Monocytes, Absolute 0.43 0.10 - 0.90 10*3/mm3    Eosinophils, Absolute 0.17 0.00 - 0.40 10*3/mm3    Basophils, Absolute 0.06 0.00 - 0.20 10*3/mm3    Immature Grans, Absolute 0.02 0.00 - 0.05 10*3/mm3    nRBC 0.0 0.0 - 0.2 /100 WBC   POC Glycosylated Hemoglobin (Hb A1C)    Collection Time: 01/19/22  8:30 AM    Specimen: Blood   Result Value Ref Range    Hemoglobin A1C 5.6 4.8 - 5.6 %    Lot Number [id number]     Expiration Date 05/19/2023        Current Medications:   Current Outpatient Medications   Medication Sig Dispense Refill   • atomoxetine (STRATTERA) 25 MG capsule Take 1 capsule by mouth Daily. 30 capsule 0   • escitalopram (LEXAPRO) 20 MG tablet Take 1 tablet by mouth Every Morning. 90 tablet 0   • Tessa 1.5/30 1.5-30 MG-MCG tablet TAKE ONE TABLET BY MOUTH DAILY 1 each 5   • hydroCHLOROthiazide (HYDRODIURIL) 25 MG tablet Take 1 tablet by mouth Daily. 90 tablet 1   • lamoTRIgine (LaMICtal) 150 MG tablet Take 1 tablet by mouth Every Morning. 30 tablet 0   • levothyroxine (SYNTHROID, LEVOTHROID) 75 MCG tablet TAKE ONE TABLET BY MOUTH DAILY 90 tablet 1   • metFORMIN (GLUCOPHAGE) 500 MG tablet TAKE ONE TABLET BY MOUTH DAILY 90 tablet 1   • Tremfya 100 MG/ML solution pen-injector Every 3 (Three) Months.       No current facility-administered medications for this visit.       Mental Status Exam:   Hygiene:   good  Cooperation:  Cooperative  Eye Contact:  Good  Psychomotor Behavior:  Appropriate  Affect:  Full range  Hopelessness: Denies  Speech:  Normal  Thought Process:  Goal directed  Thought Content:  Normal  Suicidal:  None  Homicidal:  None  Hallucinations:  None  Delusion:  None  Memory:  Intact  Orientation:  Person, Place, Time and Situation  Reliability:  good  Insight:  Good  Judgement:  Good  Impulse Control:  Good  Physical/Medical Issues:  Yes See medical history    PHQ-9 Depression Screening  Little interest or pleasure in doing things? (P) 0    Feeling down, depressed, or hopeless? (P) 0   Trouble falling or staying asleep, or sleeping too much? (P) 1   Feeling tired or having little energy? (P) 1   Poor appetite or overeating? (P) 0   Feeling bad about yourself - or that you are a failure or have let yourself or your family down? (P) 0   Trouble concentrating on things, such as reading the newspaper or watching television? (P) 2   Moving or speaking so slowly that other people could have noticed? Or the opposite - being so fidgety or restless that you have been moving around a lot more than usual? (P) 1   Thoughts that you would be better off dead, or of hurting yourself in some way? (P) 0   PHQ-9 Total Score (P) 5   If you checked off any problems, how difficult have these problems made it for you to do your work, take care of things at home, or get along with other people? (P) Very difficult        PHQ-Score Total:  PHQ-9 Total Score: (P) 5    MILA-7  Feeling nervous, anxious or on edge: (P) Not at all  Not being able to stop or control worrying: (P) Not at all  Worrying too much about different things: (P) Not at all  Trouble Relaxing: (P) Several days  Being so restless that it is hard to sit still: (P) Several days  Feeling afraid as if something awful might happen: (P) Not at all  Becoming easily annoyed or irritable: (P) Several days  MILA 7 Total Score: (P) 3  If you checked any problems, how difficult have these problems made it for you to do your work, take care of things at home, or get along with other people: (P) Somewhat difficult        PROMIS scale screening tool that patient filled out virtually reviewed by this APRN at today's encounter.       Assessment/Plan   Diagnoses and all orders for this visit:    1. Attention deficit hyperactivity disorder, combined type (Primary)  -     atomoxetine (STRATTERA) 25 MG capsule; Take 1 capsule by mouth Daily.  Dispense: 30 capsule; Refill: 0  -     escitalopram (LEXAPRO) 20 MG tablet; Take 1 tablet  by mouth Every Morning.  Dispense: 90 tablet; Refill: 0    2. Mild episode of recurrent major depressive disorder (HCC)  -     atomoxetine (STRATTERA) 25 MG capsule; Take 1 capsule by mouth Daily.  Dispense: 30 capsule; Refill: 0  -     lamoTRIgine (LaMICtal) 150 MG tablet; Take 1 tablet by mouth Every Morning.  Dispense: 30 tablet; Refill: 0  -     escitalopram (LEXAPRO) 20 MG tablet; Take 1 tablet by mouth Every Morning.  Dispense: 90 tablet; Refill: 0    3. Generalized anxiety disorder  -     atomoxetine (STRATTERA) 25 MG capsule; Take 1 capsule by mouth Daily.  Dispense: 30 capsule; Refill: 0  -     lamoTRIgine (LaMICtal) 150 MG tablet; Take 1 tablet by mouth Every Morning.  Dispense: 30 tablet; Refill: 0  -     escitalopram (LEXAPRO) 20 MG tablet; Take 1 tablet by mouth Every Morning.  Dispense: 90 tablet; Refill: 0    4. Sleeping difficulties            Visit Diagnoses:    ICD-10-CM ICD-9-CM   1. Attention deficit hyperactivity disorder, combined type  F90.2 314.01   2. Mild episode of recurrent major depressive disorder (HCC)  F33.0 296.31   3. Generalized anxiety disorder  F41.1 300.02   4. Sleeping difficulties  G47.9 780.50       GOALS:  Short Term Goals: Patient will be compliant with medication, and patient will have no significant medication related side effects.  Patient will be engaged in psychotherapy as indicated.  Patient will report subjective improvement of symptoms.  Long term goals: To stabilize mood and treat/improve subjective symptoms, the patient will stay out of the hospital, the patient will be at an optimal level of functioning, and the patient will take all medications as prescribed.  The patient verbalized understanding and agreement with goals that were mutually set.      SUICIDE RISK ASSESSMENT: Unalterable demographics and a history of mental health intervention indicate this patient is in a high risk category compared to the general population. At present, the patient denies active  SI/HI, intentions, or plans at this time and agrees to seek immediate care should such thoughts develop. The patient verbalizes understanding of how to access emergency care if needed and agrees to do so. Consideration of suicide risk and protective factors such as history, current presentation, individual strengths and weaknesses, psychosocial and environmental stressors and variables, psychiatric illness and symptoms, medical conditions and pain, took place in this interview. Based on those considerations, the patient is determined: within individual baseline and presenting no imminent risk for suicide or homicide. Other recommendations: The patient does not meet the criteria for inpatient admission and is not a safety risk to self or others at today's visit. Inpatient treatment offers no significant advantages over outpatient treatment for this patient at today's visit.      SAFETY PLAN:  Patient was given ample time for questions and fully participated in treatment planning.  Patient was encouraged to call the clinic with any questions or concerns.  Patient was informed of access to emergency care. If patient were to develop any significant symptomatology, suicidal ideation, homicidal ideation, any concerns, or feel unsafe at any time they are to call the clinic and if unable to get immediate assistance should immediately call 911 or go to the nearest emergency room.  The patient is advised to remove or secure (lock away) all lethal weapons (including guns) and sharps (including razors, scissors, knives, etc.).  All medications (including any prescribed and any over the counter medications) should be stored in a safe and secured location that is not obtainable by children/adolescents.  Patient was given an opportunity and encouraged to ask questions about their medication, illness, and treatment. Patient contracted verbally for the following: If you are experiencing an emotional crisis or have thoughts of  harming yourself or others, please go to your nearest local emergency room or call 911. Will continue to re-assess medication response and side effects frequently to establish efficacy and ensure safety. Risks, any black box warnings, side effects, off label usage, and benefits of medication and treatment discussed with patient, along with potential adverse side effects of current and/or newly prescribed medication, alternative treatment options, and OTC medications.  Patient verbalized understanding of potential risks, any off label use of medication, any black box warnings, and any side effects in their own words. The patient verbalized understanding and agreed to comply with the safety plan discussed in their own words.  Patient given the number to the office. Number also available to the 24- hour suicide hotline.       TREATMENT PLAN/GOALS: Continue medications and treatment plan as indicated. Treatment and medication options discussed during today's visit. Patient acknowledged and verbally consented to continue with current treatment plan and was educated on the importance of compliance with treatment and follow-up appointments.        -Decrease Lamictal to 150 mg daily.  Planning to taper off this medication as tolerated due to likely missed diagnosis of bipolar disorder and possibility that Lamictal is contributing to fatigue.  Discussed the patient that that tapering off of this medication as tolerated may help improve daytime fatigue and tolerability of Strattera for management of her ADHD, depression, and anxiety.  Discussed with patient that while it appears that previous diagnosis of bipolar disorder is inaccurate, if she notices any destabilization of mood with tapering off of Lamictal to notify this APRN immediately.  The patient verbalizes understanding and endorses that she is agreeable to this.  -Continue Staterra 25 mg daily for ADHD and as adjunct for anxiety/depression  -Continue Lexapro 20 mg  daily for depression and anxiety        MEDICATION ISSUES: Discussed medication options and treatment plan of prescribed medication, any off label use of medication, as well as the risks, benefits, any black box warnings including increased suicidality, and side effects including but not limited to potential falls, dizziness, possible impaired driving, GI side effects (change in appetite, abdominal discomfort, nausea, vomiting, diarrhea, and/or constipation), dry mouth, somnolence, sedation, insomnia, activation, agitation, irritation, tremors, abnormal muscle movements or disorders, headache, sweating, possible bruising or rare bleeding, electrolyte and/or fluid abnormalities, change in blood pressure/heart rate/and or heart rhythm, sexual dysfunction, and metabolic adversities among others. Patient and/or guardian agreeable to call the office with any worsening of symptoms or onset of side effects, or if any concerns or questions arise.  The contact information for the office is made available to the patient and/or guardian.  Patient and/or guardian agreeable to call 911 or go to the nearest ER should they begin having any SI/HI, or if any urgent concerns arise. No medication side effects or related complaints today.    This APRN has discussed with the patient/guardian about the possibility of serotonin syndrome when certain medications are taken together, as is the case with this patient.  This APRN has provided the patient/guardian with a list of symptoms of serotonin syndrome including symptoms of autonomic instability, altered sensorium, confusion, restlessness, agitation, myoclonus, hyperreflexia, hyperthermia, diaphoresis, tremor, chills, diarrhea and cramps, ataxia, headache, migraines, seizures, and insomnia; which could lead to permanent hyperthermic brain damage, cardiovascular collapse, coma, or even death.  The patient/guardian are instructed to stop medications immediately and either contact this  APRN/this office during regular office hours, or go to the emergency department/call 911, if they begin to experience any of the symptoms discussed.  The benefits and risks of the current medication regimen are discussed with the patient/guardian, and they feel that the benefits out weigh the risks.  The patient/guardian verbalized understanding and agreement in their own words.    This APRN has discussed the benefits and risks of taking Lamictal (Lamotrigine).  The side effects of Lamictal can include a benign rash, blurred or double vision, dizziness, ataxia, sedation, headache, tremor, insomnia, poor coordination, fatigue,  nausea, vomiting, dyspepsia, rhinitis, infection, pharyngitis, asthenia, a rare but serious rash, rare multi-organ failure associated with Moraes-Juan F Syndrome, toxic epidermal necrolysis, drug hypersensitivity syndrome, rare blood dyscrasias, rare aseptic meningitis, rare sudden unexplained deaths in people with epilepsy, withdrawal seizures upon abrupt withdrawal, and rare activation of suicidal ideation and behavior (suicidality).  This APRN has discussed that a very slow dose titration when starting, or changing doses, of Lamictal may reduce the incidence of skin rash and other side effects.  The dosage should not be titrated upwards or increased faster than recommended due to the possibility of the discussed side effects and risk of development of a skin rash (which can become life threatening).    This APRN has also discussed that if the patient stops taking the Lamictal for 5 days or longer, it will be necessary to restart the drug with an initial dose titration, as rashes have been reported on reexposure.  If the patient/guardian and Provider decide to stop the Lamictal, the patient/guardian will follow the directions of this APRN/this office as a guided taper over about two weeks is appropriate due to the risk of relapse in bipolar disorder with those with bipolar disorder, the  risk of seizures in those with epilepsy, and discontinuation symptoms upon rapid discontinuation of Lamictal.    The patient/guardian verbalizes understanding of benefits and risks as discussed, the patient/guardian feels the benefits outweigh the risks and is agreeable to continue/take Lamictal as discussed.  The patient/guardian is advised should any side effects or rash develops they are to stop the Lamictal immediately and contact this APRN/this office or go to the emergency department immediately.  The patient/guardian verbalizes understanding and agreement with treatment plan in their own words.                  [facility] No Show Policy:  We understand unexpected circumstances arise; however, anytime you miss your appointment we are unable to provide you appropriate care.  In addition, each appointment missed could have been used to provide care for others.  We ask that you call at least 24 hours in advance to cancel or reschedule an appointment.  We would like to take this opportunity to remind you of our policy stating patients who miss THREE or more appointments without cancelling or rescheduling 24 hours in advance of the appointment may be subject to cancellation of any further visits with our clinic and recommendation to seek in-person services/visits.    Please call [phone] to reschedule your appointment. If there are reasons that make it difficult for you to keep the appointments, please call and let us know how we can help.  Please understand that medication prescribing will not continue without seeing your provider.      [facility]'s No Show Policy reviewed with patient at today's visit. Patient verbalized understanding of this policy. Discussed with patient that in the event that there are three or more no show visits, it will be recommended that they pursue in-person services/visits as noncompliance with telehealth visits indicates that patient  is not an appropriate candidate for telemedicine and would likely be more appropriate for in-person services/visits. Patient verbalizes understanding and is agreeable to this.           MEDS ORDERED DURING VISIT:  New Medications Ordered This Visit   Medications   • atomoxetine (STRATTERA) 25 MG capsule     Sig: Take 1 capsule by mouth Daily.     Dispense:  30 capsule     Refill:  0   • lamoTRIgine (LaMICtal) 150 MG tablet     Sig: Take 1 tablet by mouth Every Morning.     Dispense:  30 tablet     Refill:  0   • escitalopram (LEXAPRO) 20 MG tablet     Sig: Take 1 tablet by mouth Every Morning.     Dispense:  90 tablet     Refill:  0       Return in about 4 weeks (around 5/2/2022), or if symptoms worsen or fail to improve, for Recheck.        Patient will follow-up in 4 weeks, highly encouraged the patient if she had any questions or concerns to contact the behavioral health [facility] clinic for sooner appointment patient verbalized understanding.      Functional Status: Moderate impairment     Prognosis: Fair with Ongoing Treatment             This document has been electronically signed by VIVIANE Dash  April 4, 2022 13:58 EDT      Some of the data in this electronic note has been brought forward from a previous encounter, any necessary changes have been made, it has been reviewed by this APRN, and it is accurate.       Part of this note may be an electronic transcription/translation of spoken language to printed text using the Dragon Dictation System.

## 2022-05-02 ENCOUNTER — TELEMEDICINE (OUTPATIENT)
Dept: PSYCHIATRY | Facility: CLINIC | Age: 27
End: 2022-05-02

## 2022-05-02 DIAGNOSIS — F41.1 GENERALIZED ANXIETY DISORDER: Chronic | ICD-10-CM

## 2022-05-02 DIAGNOSIS — F33.0 MILD EPISODE OF RECURRENT MAJOR DEPRESSIVE DISORDER: Chronic | ICD-10-CM

## 2022-05-02 DIAGNOSIS — F90.8 ADHD, ADULT RESIDUAL TYPE: Primary | Chronic | ICD-10-CM

## 2022-05-02 PROCEDURE — 99214 OFFICE O/P EST MOD 30 MIN: CPT | Performed by: NURSE PRACTITIONER

## 2022-05-02 RX ORDER — LAMOTRIGINE 100 MG/1
100 TABLET ORAL EVERY MORNING
Qty: 30 TABLET | Refills: 0 | Status: SHIPPED | OUTPATIENT
Start: 2022-05-02 | End: 2022-06-01 | Stop reason: SDUPTHER

## 2022-05-02 NOTE — PROGRESS NOTES
"This provider is located at the [facility] (through [facility]), [address], [place], [address] using a secure [employer] Video Visit through [employer]. Patient is being seen remotely via telehealth at their home address in [place], and stated they are in a secure environment for this session. The patient's condition being diagnosed/treated is appropriate for telemedicine. The provider identified herself as well as her credentials. The patient, and/or patients guardian, consent to be seen remotely, and when consent is given they understand that the consent allows for patient identifiable information to be sent to a third party as needed. They may refuse to be seen remotely at any time. The electronic data is encrypted and password protected, and the patient and/or guardian has been advised of the potential risks to privacy not withstanding such measures.    You have chosen to receive care through a telehealth visit.  Do you consent to use a video/audio connection for your medical care today? Yes     Subjective   Isabell Oakes is a 26 y.o. female who is here today for medication management follow up.    Chief Complaint: ADHD, depression, anxiety    History of Present Illness:  The patient describes her mood as \"good\" over the last few weeks.  The patient endorses that she \"does not feel any different at all\" since last visit with the medication changes/adjustments that were made at that time.  The patient states that she did not notice any changes with decreasing the Lamictal and denies any destabilization of mood and/or exacerbation of symptoms.  The patient states that she also has not noticed any improvements in ADHD symptoms with the Strattera.  The patient endorses that her biggest concern at this time is her ADHD.  The patient states that she is continuing to struggle with ADHD symptoms and endorses that they have been problematic for her both in the " "workplace and at home, to the point that it is affecting her relationship with her .  The patient endorses that she has not been having as much fatigue as she was shortly after beginning the Strattera, but endorses that fatigue has still been present.  The patient reports her appetite as good.  The patient reports her sleep as good.  The patient states that she has been sleeping better recently.  She states that she hasn't been waking up in the nighttime, which is an improvement for her.  The patient denies any nightmares or bad dreams.  The patient denies any new medical problems or changes in medications since last appointment with this facility.  The patient reports compliance with current medication regimen.  The patient denies any abnormal muscle movements or tics.  The patient rates her ADHD at a 7-8/10 on a 0-10 scale, with 10 being the worst.  The patient rates her depression at a 1-2/10 on a 0-10 scale, with 10 being the worst.  The patient rates her anxiety at a 4/10 on a 0-10 scale, with 10 being the worst.  The patient endorses that her anxiety has been somewhat higher recently, but endorses that she is dealing with life stressors that have contributed to this.  The patient states for example that she is contemplating resigning from her job, so endorses that she has been stressed about this and what she is going to do in the future.  The patient states that although she has had some increase in anxiety she endorses that \"it's not interfering with anything, it's just there\".  The patient rates hopelessness at a 0/10 on a 0-10 scale with 10 being the worst.  The patient denies suicidal ideations and homicidal ideations, and is convincing.  The patient denies any auditory hallucinations or visual hallucinations.  The patient does not endorse significant symptoms consistent with bipolar disorder or a psychotic illness.                  LMP:  States that she doesn't have menstrual cycles due to her " "birth control.  The patient denies any chance of pregnancy at this time.  The patient was educated that her prescribed medications can have potential risk to a developing fetus. The patient is advised to contact this APRN/this office if she becomes pregnant or plans to become pregnant.  Pt verbalizes understanding and acknowledged agreement with this plan in her own words.        Prior Psychiatric Medications:  Zoloft - initially it was effective for depression and anxiety, but endorses that eventually it lost efficacy and she was switched to Lexapro.  Vraylar - caused severe exacerbation of anxiety  Latuda - reports made her \"feel weird\", caused excessive sedation and GI side effects  Wellbutrin  mg daily - ineffective for ADHD and increased anxiety   Strattera 25 mg daily - ineffective for ADHD and caused fatigue       The following portions of the patient's history were reviewed and updated as appropriate: allergies, current medications, past family history, past medical history, past social history, past surgical history and problem list.    Review of Systems   Constitutional: Positive for fatigue. Negative for activity change, appetite change and unexpected weight change.   Psychiatric/Behavioral: Positive for decreased concentration and dysphoric mood. Negative for agitation, behavioral problems, confusion, hallucinations, self-injury, sleep disturbance and suicidal ideas. The patient is nervous/anxious and is hyperactive.        Objective   Physical Exam  Constitutional:       Appearance: Normal appearance.   Neurological:      Mental Status: She is alert.   Psychiatric:         Attention and Perception: Perception normal. She is inattentive.         Mood and Affect: Affect normal. Mood is anxious. Mood is not depressed.         Speech: Speech normal.         Behavior: Behavior normal. Behavior is cooperative.         Thought Content: Thought content normal. Thought content does not include homicidal or " suicidal ideation. Thought content does not include homicidal or suicidal plan.         Cognition and Memory: Cognition and memory normal.         Judgment: Judgment normal.       There were no vitals taken for this visit.    The patient was seen remotely today via a MyChart Video Visit through [facility].  Unable to obtain vital signs due to nature of remote visit.  Height stated at 67 inches.  Weight stated at 214 pounds.     No Known Allergies    No results found for this or any previous visit (from the past 2016 hour(s)).    Current Medications:   Current Outpatient Medications   Medication Sig Dispense Refill   • escitalopram (LEXAPRO) 20 MG tablet Take 1 tablet by mouth Every Morning. 90 tablet 0   • Tessa 1.5/30 1.5-30 MG-MCG tablet TAKE ONE TABLET BY MOUTH DAILY 1 each 5   • hydroCHLOROthiazide (HYDRODIURIL) 25 MG tablet Take 1 tablet by mouth Daily. 90 tablet 1   • lamoTRIgine (LaMICtal) 100 MG tablet Take 1 tablet by mouth Every Morning. 30 tablet 0   • levothyroxine (SYNTHROID, LEVOTHROID) 75 MCG tablet TAKE ONE TABLET BY MOUTH DAILY 90 tablet 1   • metFORMIN (GLUCOPHAGE) 500 MG tablet TAKE ONE TABLET BY MOUTH DAILY 90 tablet 1   • Tremfya 100 MG/ML solution pen-injector Every 3 (Three) Months.     • Viloxazine HCl  MG capsule sustained-release 24 hr Take 1 capsule by mouth Daily for 7 days, THEN 2 capsules Daily for 23 days. 53 capsule 0     No current facility-administered medications for this visit.       Mental Status Exam:   Hygiene:   good  Cooperation:  Cooperative  Eye Contact:  Good  Psychomotor Behavior:  Appropriate  Affect:  Full range  Hopelessness: Denies  Speech:  Normal  Thought Process:  Goal directed  Thought Content:  Normal  Suicidal:  None  Homicidal:  None  Hallucinations:  None  Delusion:  None  Memory:  Intact  Orientation:  Person, Place, Time and Situation  Reliability:  good  Insight:  Good  Judgement:  Good  Impulse Control:  Good  Physical/Medical Issues:  Yes See medical  history          Assessment/Plan   Diagnoses and all orders for this visit:    1. ADHD, adult residual type (Primary)  -     Viloxazine HCl  MG capsule sustained-release 24 hr; Take 1 capsule by mouth Daily for 7 days, THEN 2 capsules Daily for 23 days.  Dispense: 53 capsule; Refill: 0    2. Mild episode of recurrent major depressive disorder (HCC)  -     lamoTRIgine (LaMICtal) 100 MG tablet; Take 1 tablet by mouth Every Morning.  Dispense: 30 tablet; Refill: 0    3. Generalized anxiety disorder  -     lamoTRIgine (LaMICtal) 100 MG tablet; Take 1 tablet by mouth Every Morning.  Dispense: 30 tablet; Refill: 0            Visit Diagnoses:    ICD-10-CM ICD-9-CM   1. ADHD, adult residual type  F90.8 314.01   2. Mild episode of recurrent major depressive disorder (HCC)  F33.0 296.31   3. Generalized anxiety disorder  F41.1 300.02       GOALS:  Short Term Goals: Patient will be compliant with medication, and patient will have no significant medication related side effects.  Patient will be engaged in psychotherapy as indicated.  Patient will report subjective improvement of symptoms.  Long term goals: To stabilize mood and treat/improve subjective symptoms, the patient will stay out of the hospital, the patient will be at an optimal level of functioning, and the patient will take all medications as prescribed.  The patient verbalized understanding and agreement with goals that were mutually set.      SUICIDE RISK ASSESSMENT: Unalterable demographics and a history of mental health intervention indicate this patient is in a high risk category compared to the general population. At present, the patient denies active SI/HI, intentions, or plans at this time and agrees to seek immediate care should such thoughts develop. The patient verbalizes understanding of how to access emergency care if needed and agrees to do so. Consideration of suicide risk and protective factors such as history, current presentation, individual  strengths and weaknesses, psychosocial and environmental stressors and variables, psychiatric illness and symptoms, medical conditions and pain, took place in this interview. Based on those considerations, the patient is determined: within individual baseline and presenting no imminent risk for suicide or homicide. Other recommendations: The patient does not meet the criteria for inpatient admission and is not a safety risk to self or others at today's visit. Inpatient treatment offers no significant advantages over outpatient treatment for this patient at today's visit.      SAFETY PLAN:  Patient was given ample time for questions and fully participated in treatment planning.  Patient was encouraged to call the clinic with any questions or concerns.  Patient was informed of access to emergency care. If patient were to develop any significant symptomatology, suicidal ideation, homicidal ideation, any concerns, or feel unsafe at any time they are to call the clinic and if unable to get immediate assistance should immediately call 911 or go to the nearest emergency room.  The patient is advised to remove or secure (lock away) all lethal weapons (including guns) and sharps (including razors, scissors, knives, etc.).  All medications (including any prescribed and any over the counter medications) should be stored in a safe and secured location that is not obtainable by children/adolescents.  Patient was given an opportunity and encouraged to ask questions about their medication, illness, and treatment. Patient contracted verbally for the following: If you are experiencing an emotional crisis or have thoughts of harming yourself or others, please go to your nearest local emergency room or call 911. Will continue to re-assess medication response and side effects frequently to establish efficacy and ensure safety. Risks, any black box warnings, side effects, off label usage, and benefits of medication and treatment  discussed with patient, along with potential adverse side effects of current and/or newly prescribed medication, alternative treatment options, and OTC medications.  Patient verbalized understanding of potential risks, any off label use of medication, any black box warnings, and any side effects in their own words. The patient verbalized understanding and agreed to comply with the safety plan discussed in their own words.  Patient given the number to the office. Number also available to the 24- hour suicide hotline.       TREATMENT PLAN/GOALS: Continue medications and treatment plan as indicated. Treatment and medication options discussed during today's visit. Patient acknowledged and verbally consented to continue with current treatment plan and was educated on the importance of compliance with treatment and follow-up appointments.        -Decrease Lamictal to 100 mg daily.  Planning to taper off this medication as tolerated due to likely misdiagnosis of bipolar disorder and possibility that Lamictal is contributing to fatigue.  Discussed with patient that while it appears that previous diagnosis of bipolar disorder is inaccurate, if she notices any destabilization of mood and/or exacerbation of symptoms with tapering off of Lamictal to notify this APRN immediately.  The patient verbalizes understanding and endorses that she is agreeable to this.  -Discontinue Staterra 25 mg daily due to inefficacy/side effects  -Begin Qelbree 100 mg daily x1 week then increase to 200 mg daily for ADHD  -Continue Lexapro 20 mg daily for depression and anxiety        MEDICATION ISSUES: Discussed medication options and treatment plan of prescribed medication, any off label use of medication, as well as the risks, benefits, any black box warnings including increased suicidality, and side effects including but not limited to potential falls, dizziness, possible impaired driving, GI side effects (change in appetite, abdominal  discomfort, nausea, vomiting, diarrhea, and/or constipation), dry mouth, somnolence, sedation, insomnia, activation, agitation, irritation, tremors, abnormal muscle movements or disorders, headache, sweating, possible bruising or rare bleeding, electrolyte and/or fluid abnormalities, change in blood pressure/heart rate/and or heart rhythm, sexual dysfunction, and metabolic adversities among others. Patient and/or guardian agreeable to call the office with any worsening of symptoms or onset of side effects, or if any concerns or questions arise.  The contact information for the office is made available to the patient and/or guardian.  Patient and/or guardian agreeable to call 911 or go to the nearest ER should they begin having any SI/HI, or if any urgent concerns arise. No medication side effects or related complaints today.    This APRN has discussed with the patient/guardian about the possibility of serotonin syndrome when certain medications are taken together, as is the case with this patient.  This APRN has provided the patient/guardian with a list of symptoms of serotonin syndrome including symptoms of autonomic instability, altered sensorium, confusion, restlessness, agitation, myoclonus, hyperreflexia, hyperthermia, diaphoresis, tremor, chills, diarrhea and cramps, ataxia, headache, migraines, seizures, and insomnia; which could lead to permanent hyperthermic brain damage, cardiovascular collapse, coma, or even death.  The patient/guardian are instructed to stop medications immediately and either contact this APRN/this office during regular office hours, or go to the emergency department/call 911, if they begin to experience any of the symptoms discussed.  The benefits and risks of the current medication regimen are discussed with the patient/guardian, and they feel that the benefits out weigh the risks.  The patient/guardian verbalized understanding and agreement in their own words.    This APRN has  discussed the benefits and risks of taking Lamictal (Lamotrigine).  The side effects of Lamictal can include a benign rash, blurred or double vision, dizziness, ataxia, sedation, headache, tremor, insomnia, poor coordination, fatigue,  nausea, vomiting, dyspepsia, rhinitis, infection, pharyngitis, asthenia, a rare but serious rash, rare multi-organ failure associated with Moraes-Juan F Syndrome, toxic epidermal necrolysis, drug hypersensitivity syndrome, rare blood dyscrasias, rare aseptic meningitis, rare sudden unexplained deaths in people with epilepsy, withdrawal seizures upon abrupt withdrawal, and rare activation of suicidal ideation and behavior (suicidality).  This APRN has discussed that a very slow dose titration when starting, or changing doses, of Lamictal may reduce the incidence of skin rash and other side effects.  The dosage should not be titrated upwards or increased faster than recommended due to the possibility of the discussed side effects and risk of development of a skin rash (which can become life threatening).    This APRN has also discussed that if the patient stops taking the Lamictal for 5 days or longer, it will be necessary to restart the drug with an initial dose titration, as rashes have been reported on reexposure.  If the patient/guardian and Provider decide to stop the Lamictal, the patient/guardian will follow the directions of this APRN/this office as a guided taper over about two weeks is appropriate due to the risk of relapse in bipolar disorder with those with bipolar disorder, the risk of seizures in those with epilepsy, and discontinuation symptoms upon rapid discontinuation of Lamictal.    The patient/guardian verbalizes understanding of benefits and risks as discussed, the patient/guardian feels the benefits outweigh the risks and is agreeable to continue/take Lamictal as discussed.  The patient/guardian is advised should any side effects or rash develops they are to stop  the Lamictal immediately and contact this APRN/this office or go to the emergency department immediately.  The patient/guardian verbalizes understanding and agreement with treatment plan in their own words.                  [facility] No Show Policy:  We understand unexpected circumstances arise; however, anytime you miss your appointment we are unable to provide you appropriate care.  In addition, each appointment missed could have been used to provide care for others.  We ask that you call at least 24 hours in advance to cancel or reschedule an appointment.  We would like to take this opportunity to remind you of our policy stating patients who miss THREE or more appointments without cancelling or rescheduling 24 hours in advance of the appointment may be subject to cancellation of any further visits with our clinic and recommendation to seek in-person services/visits.    Please call [phone] to reschedule your appointment. If there are reasons that make it difficult for you to keep the appointments, please call and let us know how we can help.  Please understand that medication prescribing will not continue without seeing your provider.      [facility]'s No Show Policy reviewed with patient at today's visit. Patient verbalized understanding of this policy. Discussed with patient that in the event that there are three or more no show visits, it will be recommended that they pursue in-person services/visits as noncompliance with telehealth visits indicates that patient is not an appropriate candidate for telemedicine and would likely be more appropriate for in-person services/visits. Patient verbalizes understanding and is agreeable to this.           MEDS ORDERED DURING VISIT:  New Medications Ordered This Visit   Medications   • lamoTRIgine (LaMICtal) 100 MG tablet     Sig: Take 1 tablet by mouth Every Morning.     Dispense:  30 tablet     Refill:  0   •  Viloxazine HCl  MG capsule sustained-release 24 hr     Sig: Take 1 capsule by mouth Daily for 7 days, THEN 2 capsules Daily for 23 days.     Dispense:  53 capsule     Refill:  0       Return in about 4 weeks (around 5/30/2022), or if symptoms worsen or fail to improve, for Recheck.        Patient will follow-up in 4 weeks, highly encouraged the patient if she had any questions or concerns to contact the behavioral health virtual clinic for sooner appointment patient verbalized understanding.      Functional Status: Moderate impairment     Prognosis: Fair with Ongoing Treatment             This document has been electronically signed by VIVIANE Dash  May 2, 2022 14:01 EDT      Some of the data in this electronic note has been brought forward from a previous encounter, any necessary changes have been made, it has been reviewed by this APRN, and it is accurate.       Part of this note may be an electronic transcription/translation of spoken language to printed text using the Dragon Dictation System.

## 2022-05-04 ENCOUNTER — TELEPHONE (OUTPATIENT)
Dept: PSYCHIATRY | Facility: CLINIC | Age: 27
End: 2022-05-04

## 2022-05-17 ENCOUNTER — TELEPHONE (OUTPATIENT)
Dept: PSYCHIATRY | Facility: CLINIC | Age: 27
End: 2022-05-17

## 2022-05-17 DIAGNOSIS — R11.0 NAUSEA: Primary | ICD-10-CM

## 2022-05-17 RX ORDER — ONDANSETRON 4 MG/1
4 TABLET, ORALLY DISINTEGRATING ORAL EVERY 8 HOURS PRN
Qty: 30 TABLET | Refills: 0 | Status: SHIPPED | OUTPATIENT
Start: 2022-05-17 | End: 2022-06-01

## 2022-05-17 NOTE — TELEPHONE ENCOUNTER
Patient called in regards to the Qelbree.  Patient states that she is taking it at night but it makes her nauseous around lunch time.  Patient did not notice any difference on the Qelbree.  She would like to know if she should continue taking it even though it makes her sick or if she should change the time of taking it.  Please advise.

## 2022-05-17 NOTE — TELEPHONE ENCOUNTER
Patient called and reported nausea as side effect from starting Qelbree.  Zofran 4 mg sublingually every 8 hours at needed for nausea or vomiting sent in at this time.  Support staff instructed to notify patient.

## 2022-05-24 ENCOUNTER — TELEPHONE (OUTPATIENT)
Dept: PSYCHIATRY | Facility: CLINIC | Age: 27
End: 2022-05-24

## 2022-05-24 NOTE — TELEPHONE ENCOUNTER
Patient left a message stating that the Qelbree is making her sick to the point that she has stopped taking it.  She stated that the zofran you called in didn't give her any relief. Patient would like to know what she needs to do next.  Please advise.

## 2022-06-01 ENCOUNTER — PRIOR AUTHORIZATION (OUTPATIENT)
Dept: PSYCHIATRY | Facility: CLINIC | Age: 27
End: 2022-06-01

## 2022-06-01 ENCOUNTER — TELEMEDICINE (OUTPATIENT)
Dept: PSYCHIATRY | Facility: CLINIC | Age: 27
End: 2022-06-01

## 2022-06-01 DIAGNOSIS — F33.0 MILD EPISODE OF RECURRENT MAJOR DEPRESSIVE DISORDER: Chronic | ICD-10-CM

## 2022-06-01 DIAGNOSIS — F90.8 ADHD, ADULT RESIDUAL TYPE: Primary | Chronic | ICD-10-CM

## 2022-06-01 DIAGNOSIS — Z79.899 MEDICATION MANAGEMENT: ICD-10-CM

## 2022-06-01 DIAGNOSIS — F41.1 GENERALIZED ANXIETY DISORDER: Chronic | ICD-10-CM

## 2022-06-01 PROCEDURE — 99214 OFFICE O/P EST MOD 30 MIN: CPT | Performed by: NURSE PRACTITIONER

## 2022-06-01 RX ORDER — LAMOTRIGINE 100 MG/1
100 TABLET ORAL EVERY MORNING
Qty: 30 TABLET | Refills: 0 | Status: SHIPPED | OUTPATIENT
Start: 2022-06-01 | End: 2022-06-16 | Stop reason: SDUPTHER

## 2022-06-01 RX ORDER — ESCITALOPRAM OXALATE 20 MG/1
20 TABLET ORAL EVERY MORNING
Qty: 90 TABLET | Refills: 0 | Status: SHIPPED | OUTPATIENT
Start: 2022-06-01 | End: 2022-06-16 | Stop reason: SDUPTHER

## 2022-06-01 RX ORDER — DEXTROAMPHETAMINE SACCHARATE, AMPHETAMINE ASPARTATE, DEXTROAMPHETAMINE SULFATE AND AMPHETAMINE SULFATE 1.25; 1.25; 1.25; 1.25 MG/1; MG/1; MG/1; MG/1
5 TABLET ORAL 2 TIMES DAILY
Qty: 30 TABLET | Refills: 0 | Status: SHIPPED | OUTPATIENT
Start: 2022-06-01 | End: 2022-06-16 | Stop reason: SDUPTHER

## 2022-06-01 NOTE — PROGRESS NOTES
"This provider is located at the [facility] (through [facility]), [address], [place], [address] using a secure [employer] Video Visit through [employer]. Patient is being seen remotely via telehealth at their home address in [place], and stated they are in a secure environment for this session. The patient's condition being diagnosed/treated is appropriate for telemedicine. The provider identified herself as well as her credentials. The patient, and/or patients guardian, consent to be seen remotely, and when consent is given they understand that the consent allows for patient identifiable information to be sent to a third party as needed. They may refuse to be seen remotely at any time. The electronic data is encrypted and password protected, and the patient and/or guardian has been advised of the potential risks to privacy not withstanding such measures.    You have chosen to receive care through a telehealth visit.  Do you consent to use a video/audio connection for your medical care today? Yes     Subjective   Isabell Oakes is a 26 y.o. female who is here today for medication management follow up.    Chief Complaint: ADHD, depression, anxiety    History of Present Illness:  The patient describes her mood as \"good\" over the last few weeks.  The patient endorses that overall she has been doing okay over the past few weeks, but states that she was unable to tolerate Qelbree and discontinued a couple of weeks ago.  The patient states that medication was causing significant nausea.  The patient states that she tried using Zofran to help manage this, but states that it was ineffective.  The patient states that the nausea was so severe that at one point she had to leave work.  The patient states that she also experienced some vomiting as the nodules are severe.  The patient states that this resolved after discontinuing the medication.  Patient endorses that ADHD " "symptoms were not managed with this medication either.  The patient states that ADHD symptoms have actually been a little worse over the past few weeks.  The patient states that school ended so now her schedule is much less structure, so endorses that this seems to have worsened ADHD symptoms somewhat.  The patient states that she tolerated beginning the decreased dose of Lamictal very well.  The patient endorses that she did not notice any mood destabilization, exacerbation of symptoms, and/for side effects with beginning the decreased dose.  The patient endorses that her depression and anxiety have continued to be very well managed.  The patient describes her depression and anxiety as being \"minimal\" and manageable\" currently.  She endorses that her biggest concern at this time is her ADHD and obtaining better management of the symptoms.  The patient reports her appetite as good.  The patient reports her sleep as good.  The patient denies any nightmares or bad dreams.  The patient denies any abnormal muscle movements or tics.  The patient rates her ADHD at a 8/10 on a 0-10 scale, with 10 being the worst.  The patient rates her depression at a 1/10 on a 0-10 scale, with 10 being the worst.  The patient rates her anxiety at a 2-3/10 on a 0-10 scale, with 10 being the worst.  The patient rates hopelessness at a 0/10 on a 0-10 scale with 10 being the worst.  The patient denies suicidal ideations and homicidal ideations, and is convincing.  The patient denies any auditory hallucinations or visual hallucinations.  The patient does not endorse significant symptoms consistent with bipolar disorder or a psychotic illness.              LMP:  States that she doesn't have menstrual cycles due to her birth control.  The patient denies any chance of pregnancy at this time.  The patient was educated that her prescribed medications can have potential risk to a developing fetus. The patient is advised to contact this APRN/this " "office if she becomes pregnant or plans to become pregnant.  Pt verbalizes understanding and acknowledged agreement with this plan in her own words.        Prior Psychiatric Medications:  Zoloft - initially it was effective for depression and anxiety, but endorses that eventually it lost efficacy and she was switched to Lexapro.  Vraylar - caused severe exacerbation of anxiety  Latuda - reports made her \"feel weird\", caused excessive sedation and GI side effects  Wellbutrin  mg daily - ineffective for ADHD and increased anxiety   Strattera 25 mg daily - ineffective for ADHD and caused fatigue   Qelbree - caused significant nausea      The following portions of the patient's history were reviewed and updated as appropriate: allergies, current medications, past family history, past medical history, past social history, past surgical history and problem list.    Review of Systems   Constitutional: Positive for fatigue. Negative for activity change, appetite change and unexpected weight change.   Psychiatric/Behavioral: Positive for decreased concentration and dysphoric mood. Negative for agitation, behavioral problems, confusion, hallucinations, self-injury, sleep disturbance and suicidal ideas. The patient is nervous/anxious and is hyperactive.        Objective   Physical Exam  Constitutional:       Appearance: Normal appearance.   Neurological:      Mental Status: She is alert.   Psychiatric:         Attention and Perception: Perception normal. She is inattentive.         Mood and Affect: Mood and affect normal. Mood is not anxious or depressed.         Speech: Speech normal.         Behavior: Behavior normal. Behavior is cooperative.         Thought Content: Thought content normal. Thought content does not include homicidal or suicidal ideation. Thought content does not include homicidal or suicidal plan.         Cognition and Memory: Cognition and memory normal.         Judgment: Judgment normal.       There " were no vitals taken for this visit.    The patient was seen remotely today via a MyChart Video Visit through [facility].  Unable to obtain vital signs due to nature of remote visit.  Height stated at 67 inches.  Weight stated at 214 pounds.     No Known Allergies    No results found for this or any previous visit (from the past 2016 hour(s)).    Current Medications:   Current Outpatient Medications   Medication Sig Dispense Refill   • escitalopram (LEXAPRO) 20 MG tablet Take 1 tablet by mouth Every Morning. 90 tablet 0   • Tessa 1.5/30 1.5-30 MG-MCG tablet TAKE ONE TABLET BY MOUTH DAILY 1 each 5   • hydroCHLOROthiazide (HYDRODIURIL) 25 MG tablet Take 1 tablet by mouth Daily. 90 tablet 1   • lamoTRIgine (LaMICtal) 100 MG tablet Take 1 tablet by mouth Every Morning. 30 tablet 0   • levothyroxine (SYNTHROID, LEVOTHROID) 75 MCG tablet TAKE ONE TABLET BY MOUTH DAILY 90 tablet 1   • metFORMIN (GLUCOPHAGE) 500 MG tablet TAKE ONE TABLET BY MOUTH DAILY 90 tablet 1   • Tremfya 100 MG/ML solution pen-injector Every 3 (Three) Months.     • amphetamine-dextroamphetamine (ADDERALL) 5 MG tablet Take 1 tablet by mouth 2 (Two) Times a Day. 30 tablet 0     No current facility-administered medications for this visit.       Mental Status Exam:   Hygiene:   good  Cooperation:  Cooperative  Eye Contact:  Good  Psychomotor Behavior:  Appropriate  Affect:  Full range  Hopelessness: Denies  Speech:  Normal  Thought Process:  Goal directed  Thought Content:  Normal  Suicidal:  None  Homicidal:  None  Hallucinations:  None  Delusion:  None  Memory:  Intact  Orientation:  Person, Place, Time and Situation  Reliability:  good  Insight:  Good  Judgement:  Good  Impulse Control:  Good  Physical/Medical Issues:  Yes See medical history          Assessment & Plan   Diagnoses and all orders for this visit:    1. ADHD, adult residual type (Primary)  -     amphetamine-dextroamphetamine (ADDERALL) 5 MG tablet; Take 1 tablet by mouth 2 (Two) Times a Day.   Dispense: 30 tablet; Refill: 0    2. Mild episode of recurrent major depressive disorder (HCC)  -     escitalopram (LEXAPRO) 20 MG tablet; Take 1 tablet by mouth Every Morning.  Dispense: 90 tablet; Refill: 0  -     lamoTRIgine (LaMICtal) 100 MG tablet; Take 1 tablet by mouth Every Morning.  Dispense: 30 tablet; Refill: 0    3. Generalized anxiety disorder  -     escitalopram (LEXAPRO) 20 MG tablet; Take 1 tablet by mouth Every Morning.  Dispense: 90 tablet; Refill: 0  -     lamoTRIgine (LaMICtal) 100 MG tablet; Take 1 tablet by mouth Every Morning.  Dispense: 30 tablet; Refill: 0    4. Medication management  -     Urine Drug Screen - Urine, Clean Catch; Future            Visit Diagnoses:    ICD-10-CM ICD-9-CM   1. ADHD, adult residual type  F90.8 314.01   2. Mild episode of recurrent major depressive disorder (HCC)  F33.0 296.31   3. Generalized anxiety disorder  F41.1 300.02   4. Medication management  Z79.899 V58.69       GOALS:  Short Term Goals: Patient will be compliant with medication, and patient will have no significant medication related side effects.  Patient will be engaged in psychotherapy as indicated.  Patient will report subjective improvement of symptoms.  Long term goals: To stabilize mood and treat/improve subjective symptoms, the patient will stay out of the hospital, the patient will be at an optimal level of functioning, and the patient will take all medications as prescribed.  The patient verbalized understanding and agreement with goals that were mutually set.      SUICIDE RISK ASSESSMENT: Unalterable demographics and a history of mental health intervention indicate this patient is in a high risk category compared to the general population. At present, the patient denies active SI/HI, intentions, or plans at this time and agrees to seek immediate care should such thoughts develop. The patient verbalizes understanding of how to access emergency care if needed and agrees to do so. Consideration  of suicide risk and protective factors such as history, current presentation, individual strengths and weaknesses, psychosocial and environmental stressors and variables, psychiatric illness and symptoms, medical conditions and pain, took place in this interview. Based on those considerations, the patient is determined: within individual baseline and presenting no imminent risk for suicide or homicide. Other recommendations: The patient does not meet the criteria for inpatient admission and is not a safety risk to self or others at today's visit. Inpatient treatment offers no significant advantages over outpatient treatment for this patient at today's visit.      SAFETY PLAN:  Patient was given ample time for questions and fully participated in treatment planning.  Patient was encouraged to call the clinic with any questions or concerns.  Patient was informed of access to emergency care. If patient were to develop any significant symptomatology, suicidal ideation, homicidal ideation, any concerns, or feel unsafe at any time they are to call the clinic and if unable to get immediate assistance should immediately call 911 or go to the nearest emergency room.  The patient is advised to remove or secure (lock away) all lethal weapons (including guns) and sharps (including razors, scissors, knives, etc.).  All medications (including any prescribed and any over the counter medications) should be stored in a safe and secured location that is not obtainable by children/adolescents.  Patient was given an opportunity and encouraged to ask questions about their medication, illness, and treatment. Patient contracted verbally for the following: If you are experiencing an emotional crisis or have thoughts of harming yourself or others, please go to your nearest local emergency room or call 911. Will continue to re-assess medication response and side effects frequently to establish efficacy and ensure safety. Risks, any black box  warnings, side effects, off label usage, and benefits of medication and treatment discussed with patient, along with potential adverse side effects of current and/or newly prescribed medication, alternative treatment options, and OTC medications.  Patient verbalized understanding of potential risks, any off label use of medication, any black box warnings, and any side effects in their own words. The patient verbalized understanding and agreed to comply with the safety plan discussed in their own words.  Patient given the number to the office. Number also available to the 24- hour suicide hotline.       TREATMENT PLAN/GOALS: Continue medications and treatment plan as indicated. Treatment and medication options discussed during today's visit. Patient acknowledged and verbally consented to continue with current treatment plan and was educated on the importance of compliance with treatment and follow-up appointments.        -Discontinue Qelbree due to side effects.  Patient discontinued medication approximately 2 weeks ago due to intolerability of side effects (nausea).  -Complete UDS for monitoring due to beginning treatment with controlled substance at this time.  Instructed patient to complete UDS prior to beginning Adderall.  The patient verbalized understanding and endorses that she is agreeable to this.  Sent patient a [employer] message with list of [facility] labs in her area where she can complete UDS.  -Begin Adderall 2.5-5 mg twice daily for ADHD.  Discussed with the patient that due to side effects/intolerability with several nonstimulants we will trial stimulant for ADHD at low dose at this time.  Instructed the patient to begin with 2.5 mg twice daily to ensure tolerability and then increase to 5 mg twice daily as tolerated if symptoms are not well-controlled with lower dose.  The patient verbalizes understanding and endorses that she is agreeable to this.   -Continue Lamictal 100 mg daily as adjunct for  depression/anxiety.  Discussed with the patient that we will pause tapering of Lamictal at this time since she is starting a new medication.  Discussed with patient that if we make adjustments with her Lamictal while starting a new medication it is difficult to definitively determine the cause of side effects and/or exacerbation of symptoms if they occur, so discussed that we will pause tapering Lamictal at this time.  Discussed with patient that if she tolerates beginning Adderall and has not experience any side effects and/or exacerbation of symptoms we will likely resume tapering of Lamictal at that time as it appears previous diagnosis of bipolar disorder is inaccurate and this medication could be contributing to fatigue.  Again discussed with patient that while it appears that previous diagnosis of bipolar disorder is inaccurate, if she notices any destabilization of mood and/or exacerbation of symptoms with starting Adderall or tapering off of Lamictal when this is resumed, she should notify this APRN immediately.  The patient verbalizes understanding and endorses that she is agreeable to this.  -Continue Lexapro 20 mg daily for depression and anxiety        MEDICATION ISSUES: Discussed medication options and treatment plan of prescribed medication, any off label use of medication, as well as the risks, benefits, any black box warnings including increased suicidality, and side effects including but not limited to potential falls, dizziness, possible impaired driving, GI side effects (change in appetite, abdominal discomfort, nausea, vomiting, diarrhea, and/or constipation), dry mouth, somnolence, sedation, insomnia, activation, agitation, irritation, tremors, abnormal muscle movements or disorders, headache, sweating, possible bruising or rare bleeding, electrolyte and/or fluid abnormalities, change in blood pressure/heart rate/and or heart rhythm, sexual dysfunction, and metabolic adversities among others.  Patient and/or guardian agreeable to call the office with any worsening of symptoms or onset of side effects, or if any concerns or questions arise.  The contact information for the office is made available to the patient and/or guardian.  Patient and/or guardian agreeable to call 911 or go to the nearest ER should they begin having any SI/HI, or if any urgent concerns arise. No medication side effects or related complaints today.    This APRN has discussed the benefits and risks of taking Lamictal (Lamotrigine).  The side effects of Lamictal can include a benign rash, blurred or double vision, dizziness, ataxia, sedation, headache, tremor, insomnia, poor coordination, fatigue,  nausea, vomiting, dyspepsia, rhinitis, infection, pharyngitis, asthenia, a rare but serious rash, rare multi-organ failure associated with Moraes-Juan F Syndrome, toxic epidermal necrolysis, drug hypersensitivity syndrome, rare blood dyscrasias, rare aseptic meningitis, rare sudden unexplained deaths in people with epilepsy, withdrawal seizures upon abrupt withdrawal, and rare activation of suicidal ideation and behavior (suicidality).  This APRN has discussed that a very slow dose titration when starting, or changing doses, of Lamictal may reduce the incidence of skin rash and other side effects.  The dosage should not be titrated upwards or increased faster than recommended due to the possibility of the discussed side effects and risk of development of a skin rash (which can become life threatening).    This APRN has also discussed that if the patient stops taking the Lamictal for 5 days or longer, it will be necessary to restart the drug with an initial dose titration, as rashes have been reported on reexposure.  If the patient/guardian and Provider decide to stop the Lamictal, the patient/guardian will follow the directions of this APRN/this office as a guided taper over about two weeks is appropriate due to the risk of relapse in  bipolar disorder with those with bipolar disorder, the risk of seizures in those with epilepsy, and discontinuation symptoms upon rapid discontinuation of Lamictal.    The patient/guardian verbalizes understanding of benefits and risks as discussed, the patient/guardian feels the benefits outweigh the risks and is agreeable to continue/take Lamictal as discussed.  The patient/guardian is advised should any side effects or rash develops they are to stop the Lamictal immediately and contact this APRN/this office or go to the emergency department immediately.  The patient/guardian verbalizes understanding and agreement with treatment plan in their own words.    The patient is being prescribed a controlled substance as part of the treatment plan. The patient/guardian has been educated of appropriate use of the medication(s), including risks and side effects such as insomnia, headache, exacerbation of tics, nervousness, irritability, overstimulation, tremor, dizziness, anorexia or change in appetite, nausea, dry mouth, constipation, diarrhea, weight loss, sexual dysfunction, psychotic episodes, seizures, palpitations, tachycardia, hypertension, rare activation (activation of hypomania, rozina, and/or suicidal ideations), cardiovascular adverse effects including sudden death (especially patients with pre-existing structural abnormalities often associated with a family history of cardiac disease), may slow normal growth in children, weight gain is reported but not expected, sedation is possible but activation is much more common, metabolic adversities, and overdose among others. Patient/guardian is also informed that the medication is to be used by the patient only, the medication is to be used only as directed, and the medication should not be combined with other substances unless directed by a Provider/Prescriber. The patient/guardian verbalized understanding and agreement with this in their own words, and wish to  continue with current treatment plan.                    [facility] No Show Policy:  We understand unexpected circumstances arise; however, anytime you miss your appointment we are unable to provide you appropriate care.  In addition, each appointment missed could have been used to provide care for others.  We ask that you call at least 24 hours in advance to cancel or reschedule an appointment.  We would like to take this opportunity to remind you of our policy stating patients who miss THREE or more appointments without cancelling or rescheduling 24 hours in advance of the appointment may be subject to cancellation of any further visits with our clinic and recommendation to seek in-person services/visits.    Please call [phone] to reschedule your appointment. If there are reasons that make it difficult for you to keep the appointments, please call and let us know how we can help.  Please understand that medication prescribing will not continue without seeing your provider.      [facility]'s No Show Policy reviewed with patient at today's visit. Patient verbalized understanding of this policy. Discussed with patient that in the event that there are three or more no show visits, it will be recommended that they pursue in-person services/visits as noncompliance with telehealth visits indicates that patient is not an appropriate candidate for telemedicine and would likely be more appropriate for in-person services/visits. Patient verbalizes understanding and is agreeable to this.           MEDS ORDERED DURING VISIT:  New Medications Ordered This Visit   Medications   • escitalopram (LEXAPRO) 20 MG tablet     Sig: Take 1 tablet by mouth Every Morning.     Dispense:  90 tablet     Refill:  0   • lamoTRIgine (LaMICtal) 100 MG tablet     Sig: Take 1 tablet by mouth Every Morning.     Dispense:  30 tablet     Refill:  0   • amphetamine-dextroamphetamine (ADDERALL) 5 MG  tablet     Sig: Take 1 tablet by mouth 2 (Two) Times a Day.     Dispense:  30 tablet     Refill:  0       Return in about 2 weeks (around 6/15/2022), or if symptoms worsen or fail to improve, for Recheck.        Patient will follow-up in 2 weeks, highly encouraged the patient if she had any questions or concerns to contact the behavioral health virtual clinic for sooner appointment patient verbalized understanding.      Functional Status: Moderate impairment     Prognosis: Guarded with Ongoing Treatment            This document has been electronically signed by VIVIANE Dash  June 1, 2022 14:10 EDT      Some of the data in this electronic note has been brought forward from a previous encounter, any necessary changes have been made, it has been reviewed by this APRN, and it is accurate.       Part of this note may be an electronic transcription/translation of spoken language to printed text using the Dragon Dictation System.

## 2022-06-02 ENCOUNTER — LAB (OUTPATIENT)
Dept: LAB | Facility: HOSPITAL | Age: 27
End: 2022-06-02

## 2022-06-02 DIAGNOSIS — Z79.899 MEDICATION MANAGEMENT: ICD-10-CM

## 2022-06-02 LAB
AMPHET+METHAMPHET UR QL: NEGATIVE
AMPHETAMINES UR QL: NEGATIVE
BARBITURATES UR QL SCN: NEGATIVE
BENZODIAZ UR QL SCN: NEGATIVE
BUPRENORPHINE SERPL-MCNC: NEGATIVE NG/ML
CANNABINOIDS SERPL QL: NEGATIVE
COCAINE UR QL: NEGATIVE
METHADONE UR QL SCN: NEGATIVE
OPIATES UR QL: NEGATIVE
OXYCODONE UR QL SCN: NEGATIVE
PCP UR QL SCN: NEGATIVE
PROPOXYPH UR QL: NEGATIVE
TRICYCLICS UR QL SCN: NEGATIVE

## 2022-06-02 PROCEDURE — 80306 DRUG TEST PRSMV INSTRMNT: CPT

## 2022-06-02 RX ORDER — NORETHINDRONE ACETATE AND ETHINYL ESTRADIOL 1.5; .03 MG/1; MG/1
TABLET ORAL
Qty: 63 TABLET | Refills: 1 | Status: SHIPPED | OUTPATIENT
Start: 2022-06-02 | End: 2022-11-16 | Stop reason: SDUPTHER

## 2022-06-02 NOTE — TELEPHONE ENCOUNTER
Rx Refill Note  Requested Prescriptions     Pending Prescriptions Disp Refills   • Abril 1.5/30 1.5-30 MG-MCG tablet [Pharmacy Med Name: ABRIL 21 1.5 MG-30 MCG TAB] 63 tablet      Sig: TAKE ONE TABLET BY MOUTH DAILY      Last office visit with prescribing clinician: 4/1/2022      Next office visit with prescribing clinician: 7/25/2022            Radha Lopez LPN  06/02/22, 07:48 EDT

## 2022-06-16 DIAGNOSIS — F41.1 GENERALIZED ANXIETY DISORDER: Chronic | ICD-10-CM

## 2022-06-16 DIAGNOSIS — F90.8 ADHD, ADULT RESIDUAL TYPE: Chronic | ICD-10-CM

## 2022-06-16 DIAGNOSIS — F33.0 MILD EPISODE OF RECURRENT MAJOR DEPRESSIVE DISORDER: Chronic | ICD-10-CM

## 2022-06-16 RX ORDER — LAMOTRIGINE 100 MG/1
100 TABLET ORAL EVERY MORNING
Qty: 30 TABLET | Refills: 0 | Status: SHIPPED | OUTPATIENT
Start: 2022-06-16 | End: 2022-07-05 | Stop reason: SDUPTHER

## 2022-06-16 RX ORDER — ESCITALOPRAM OXALATE 20 MG/1
20 TABLET ORAL EVERY MORNING
Qty: 90 TABLET | Refills: 0 | Status: SHIPPED | OUTPATIENT
Start: 2022-06-16 | End: 2022-09-08 | Stop reason: SDUPTHER

## 2022-06-16 RX ORDER — DEXTROAMPHETAMINE SACCHARATE, AMPHETAMINE ASPARTATE, DEXTROAMPHETAMINE SULFATE AND AMPHETAMINE SULFATE 1.25; 1.25; 1.25; 1.25 MG/1; MG/1; MG/1; MG/1
5 TABLET ORAL 2 TIMES DAILY
Qty: 30 TABLET | Refills: 0 | Status: SHIPPED | OUTPATIENT
Start: 2022-06-16 | End: 2022-07-05

## 2022-07-05 ENCOUNTER — TELEMEDICINE (OUTPATIENT)
Dept: PSYCHIATRY | Facility: CLINIC | Age: 27
End: 2022-07-05

## 2022-07-05 DIAGNOSIS — F90.8 ADHD, ADULT RESIDUAL TYPE: Primary | Chronic | ICD-10-CM

## 2022-07-05 DIAGNOSIS — F41.1 GENERALIZED ANXIETY DISORDER: Chronic | ICD-10-CM

## 2022-07-05 DIAGNOSIS — F33.0 MILD EPISODE OF RECURRENT MAJOR DEPRESSIVE DISORDER: Chronic | ICD-10-CM

## 2022-07-05 PROCEDURE — 99214 OFFICE O/P EST MOD 30 MIN: CPT | Performed by: NURSE PRACTITIONER

## 2022-07-05 RX ORDER — LAMOTRIGINE 100 MG/1
100 TABLET ORAL EVERY MORNING
Qty: 30 TABLET | Refills: 0 | Status: SHIPPED | OUTPATIENT
Start: 2022-07-05 | End: 2022-08-02 | Stop reason: SDUPTHER

## 2022-07-05 RX ORDER — DEXTROAMPHETAMINE SACCHARATE, AMPHETAMINE ASPARTATE MONOHYDRATE, DEXTROAMPHETAMINE SULFATE AND AMPHETAMINE SULFATE 5; 5; 5; 5 MG/1; MG/1; MG/1; MG/1
20 CAPSULE, EXTENDED RELEASE ORAL EVERY MORNING
Qty: 30 CAPSULE | Refills: 0 | Status: SHIPPED | OUTPATIENT
Start: 2022-07-05 | End: 2022-08-02 | Stop reason: SDUPTHER

## 2022-07-05 NOTE — PROGRESS NOTES
"This provider is located at the [facility] (through [facility]), [address], [facility], [address] using a secure [employer] Video Visit through [employer]. Patient is being seen remotely via telehealth at their home address in [place], and stated they are in a secure environment for this session. The patient's condition being diagnosed/treated is appropriate for telemedicine. The provider identified herself as well as her credentials. The patient, and/or patients guardian, consent to be seen remotely, and when consent is given they understand that the consent allows for patient identifiable information to be sent to a third party as needed. They may refuse to be seen remotely at any time. The electronic data is encrypted and password protected, and the patient and/or guardian has been advised of the potential risks to privacy not withstanding such measures.    You have chosen to receive care through a telehealth visit.  Do you consent to use a video/audio connection for your medical care today? Yes     Subjective   Isabell Oakes is a 26 y.o. female who is here today for medication management follow up.    Chief Complaint: ADHD, depression, anxiety    History of Present Illness:  The patient describes her mood as \"good\" over the last few weeks.  Patient endorses that she has been doing well over the past few weeks.  The patient states that she has tolerated starting Adderall well and denies any side effects with this medication.  The patient denies any mood changes or mood destabilization since starting the Adderall.  The patient also denies any exacerbations of anxiety with starting this medication.  The patient endorses that she has noticed improvement in ADHD symptoms since beginning the Adderall.  The patient states that symptoms are not quite as well controlled as she would like with the medication, but states that overall there has been improvement in these " symptoms from baseline.  The patient reports that she would like to discuss potentially switching to once daily dosing of this medication as it is sometimes difficult for her to remember taking the second dose.  The patient reports her appetite as good.  The patient reports her sleep as good.  The patient denies any nightmares or bad dreams.  The patient denies any new medical problems or changes in medications since last appointment with this facility.  The patient reports compliance with current medication regimen.  The patient denies any current side effects from her current medication regimen.  The patient denies any abnormal muscle movements or tics. The patient rates her ADHD at a 3-4/10 on a 0-10 scale, with 10 being the worst.  The patient rates her depression at a 0/10 on a 0-10 scale, with 10 being the worst.  The patient rates her anxiety at a 1-2/10 on a 0-10 scale, with 10 being the worst.  The patient rates hopelessness at a 0/10 on a 0-10 scale with 10 being the worst.  The patient denies suicidal ideations and homicidal ideations, and is convincing.  The patient denies any auditory hallucinations or visual hallucinations.  The patient does not endorse significant symptoms consistent with bipolar disorder or a psychotic illness.            LMP:  States that she doesn't have menstrual cycles due to her birth control.  The patient denies any chance of pregnancy at this time.  The patient was educated that her prescribed medications can have potential risk to a developing fetus. The patient is advised to contact this APRN/this office if she becomes pregnant or plans to become pregnant.  Pt verbalizes understanding and acknowledged agreement with this plan in her own words.        Prior Psychiatric Medications:  Zoloft - initially it was effective for depression and anxiety, but endorses that eventually it lost efficacy and she was switched to Lexapro.  Vraylar - caused severe exacerbation of  "anxiety  Latuda - reports made her \"feel weird\", caused excessive sedation and GI side effects  Wellbutrin  mg daily - ineffective for ADHD and increased anxiety   Strattera 25 mg daily - ineffective for ADHD and caused fatigue   Qelbree - caused significant nausea      The following portions of the patient's history were reviewed and updated as appropriate: allergies, current medications, past family history, past medical history, past social history, past surgical history and problem list.    Review of Systems   Constitutional: Positive for fatigue. Negative for activity change, appetite change and unexpected weight change.   Psychiatric/Behavioral: Positive for decreased concentration. Negative for agitation, behavioral problems, confusion, dysphoric mood, hallucinations, self-injury, sleep disturbance and suicidal ideas. The patient is nervous/anxious and is hyperactive.        Objective   Physical Exam  Constitutional:       Appearance: Normal appearance.   Neurological:      Mental Status: She is alert.   Psychiatric:         Attention and Perception: Perception normal. She is inattentive.         Mood and Affect: Mood and affect normal. Mood is not anxious or depressed.         Speech: Speech normal.         Behavior: Behavior normal. Behavior is cooperative.         Thought Content: Thought content normal. Thought content does not include homicidal or suicidal ideation. Thought content does not include homicidal or suicidal plan.         Cognition and Memory: Cognition and memory normal.         Judgment: Judgment normal.       There were no vitals taken for this visit.    The patient was seen remotely today via a MyChart Video Visit through [facility].  Unable to obtain vital signs due to nature of remote visit.  Height stated at 67 inches.  Weight stated at 214 pounds.     No Known Allergies    Recent Results (from the past 2016 hour(s))   Urine Drug Screen - Urine, Clean Catch    Collection Time: 06/02/22 "  2:56 PM    Specimen: Urine, Clean Catch   Result Value Ref Range    THC, Screen, Urine Negative Negative    Phencyclidine (PCP), Urine Negative Negative    Cocaine Screen, Urine Negative Negative    Methamphetamine, Ur Negative Negative    Opiate Screen Negative Negative    Amphetamine Screen, Urine Negative Negative    Benzodiazepine Screen, Urine Negative Negative    Tricyclic Antidepressants Screen Negative Negative    Methadone Screen, Urine Negative Negative    Barbiturates Screen, Urine Negative Negative    Oxycodone Screen, Urine Negative Negative    Propoxyphene Screen Negative Negative    Buprenorphine, Screen, Urine Negative Negative       Current Medications:   Current Outpatient Medications   Medication Sig Dispense Refill   • escitalopram (LEXAPRO) 20 MG tablet Take 1 tablet by mouth Every Morning. 90 tablet 0   • Tessa 1.5/30 1.5-30 MG-MCG tablet TAKE ONE TABLET BY MOUTH DAILY 63 tablet 1   • hydroCHLOROthiazide (HYDRODIURIL) 25 MG tablet Take 1 tablet by mouth Daily. 90 tablet 1   • lamoTRIgine (LaMICtal) 100 MG tablet Take 1 tablet by mouth Every Morning. 30 tablet 0   • levothyroxine (SYNTHROID, LEVOTHROID) 75 MCG tablet TAKE ONE TABLET BY MOUTH DAILY 90 tablet 1   • metFORMIN (GLUCOPHAGE) 500 MG tablet TAKE ONE TABLET BY MOUTH DAILY 90 tablet 1   • Tremfya 100 MG/ML solution pen-injector Every 3 (Three) Months.     • amphetamine-dextroamphetamine XR (ADDERALL XR) 20 MG 24 hr capsule Take 1 capsule by mouth Every Morning 30 capsule 0     No current facility-administered medications for this visit.       Mental Status Exam:   Hygiene:   good  Cooperation:  Cooperative  Eye Contact:  Good  Psychomotor Behavior:  Appropriate  Affect:  Full range  Hopelessness: Denies  Speech:  Normal  Thought Process:  Goal directed  Thought Content:  Normal  Suicidal:  None  Homicidal:  None  Hallucinations:  None  Delusion:  None  Memory:  Intact  Orientation:  Person, Place, Time and Situation  Reliability:   good  Insight:  Good  Judgement:  Good  Impulse Control:  Good  Physical/Medical Issues:  Yes See medical history        The [facility] report, request number [id number], of the past 12 months were reviewed and is appropriate.  The patient/guardian reports taking the medication only as prescribed.  The patient/guardian denies any abuse or misuse of the medication.  The patient/guardian denies any other substance use or issues.  There are no apparent substance related issues.  The patient reports no side effects of the current medication usage.  The patient/guardian has reported significant improvement with medication usage and wishes to continue medication as prescribed.  The patient/guardian is appropriate to continue with current medication usage at this time.  Reinforced risks and side effects of medication usage, patient and/or guardian verbalize understanding in their own words and are in agreement with current plan.        Assessment & Plan   Diagnoses and all orders for this visit:    1. ADHD, adult residual type (Primary)  -     amphetamine-dextroamphetamine XR (ADDERALL XR) 20 MG 24 hr capsule; Take 1 capsule by mouth Every Morning  Dispense: 30 capsule; Refill: 0    2. Mild episode of recurrent major depressive disorder (HCC)  -     lamoTRIgine (LaMICtal) 100 MG tablet; Take 1 tablet by mouth Every Morning.  Dispense: 30 tablet; Refill: 0    3. Generalized anxiety disorder  -     lamoTRIgine (LaMICtal) 100 MG tablet; Take 1 tablet by mouth Every Morning.  Dispense: 30 tablet; Refill: 0            Visit Diagnoses:    ICD-10-CM ICD-9-CM   1. ADHD, adult residual type  F90.8 314.01   2. Mild episode of recurrent major depressive disorder (HCC)  F33.0 296.31   3. Generalized anxiety disorder  F41.1 300.02       GOALS:  Short Term Goals: Patient will be compliant with medication, and patient will have no significant medication related side effects.  Patient will be engaged in psychotherapy as indicated.  Patient  will report subjective improvement of symptoms.  Long term goals: To stabilize mood and treat/improve subjective symptoms, the patient will stay out of the hospital, the patient will be at an optimal level of functioning, and the patient will take all medications as prescribed.  The patient verbalized understanding and agreement with goals that were mutually set.      SUICIDE RISK ASSESSMENT: Unalterable demographics and a history of mental health intervention indicate this patient is in a high risk category compared to the general population. At present, the patient denies active SI/HI, intentions, or plans at this time and agrees to seek immediate care should such thoughts develop. The patient verbalizes understanding of how to access emergency care if needed and agrees to do so. Consideration of suicide risk and protective factors such as history, current presentation, individual strengths and weaknesses, psychosocial and environmental stressors and variables, psychiatric illness and symptoms, medical conditions and pain, took place in this interview. Based on those considerations, the patient is determined: within individual baseline and presenting no imminent risk for suicide or homicide. Other recommendations: The patient does not meet the criteria for inpatient admission and is not a safety risk to self or others at today's visit. Inpatient treatment offers no significant advantages over outpatient treatment for this patient at today's visit.      SAFETY PLAN:  Patient was given ample time for questions and fully participated in treatment planning.  Patient was encouraged to call the clinic with any questions or concerns.  Patient was informed of access to emergency care. If patient were to develop any significant symptomatology, suicidal ideation, homicidal ideation, any concerns, or feel unsafe at any time they are to call the clinic and if unable to get immediate assistance should immediately call 911 or  go to the nearest emergency room.  The patient is advised to remove or secure (lock away) all lethal weapons (including guns) and sharps (including razors, scissors, knives, etc.).  All medications (including any prescribed and any over the counter medications) should be stored in a safe and secured location that is not obtainable by children/adolescents.  Patient was given an opportunity and encouraged to ask questions about their medication, illness, and treatment. Patient contracted verbally for the following: If you are experiencing an emotional crisis or have thoughts of harming yourself or others, please go to your nearest local emergency room or call 911. Will continue to re-assess medication response and side effects frequently to establish efficacy and ensure safety. Risks, any black box warnings, side effects, off label usage, and benefits of medication and treatment discussed with patient, along with potential adverse side effects of current and/or newly prescribed medication, alternative treatment options, and OTC medications.  Patient verbalized understanding of potential risks, any off label use of medication, any black box warnings, and any side effects in their own words. The patient verbalized understanding and agreed to comply with the safety plan discussed in their own words.  Patient given the number to the office. Number also available to the 24- hour suicide hotline.       TREATMENT PLAN/GOALS: Continue medications and treatment plan as indicated. Treatment and medication options discussed during today's visit. Patient acknowledged and verbally consented to continue with current treatment plan and was educated on the importance of compliance with treatment and follow-up appointments.        -Discontinue Adderall 5 mg twice daily.  Discussed with the patient that since tolerability with this medication has been established we will switch to XR formulation of this medication to help with  compliance as the patient endorses that it is difficult to remember twice daily dosing.  Discussed with patient that we will plan to increase the dose of this medication to 20 mg daily, but we will be switching to XR formulation of this medication it will be 20 mg daily in a single dose of the XR formulation rather than 10 mg twice daily with immediate release formulation.  The patient verbalizes understanding and endorses that she is agreeable to this.  -Begin Adderall XR 20 mg daily for ADHD  -Continue Lamictal 100 mg daily as adjunct for depression/anxiety.  Discussed with the patient that we will pause tapering of Lamictal at this time since she is starting a new medication.  Discussed with patient that if we make adjustments with her Lamictal while starting a new medication it is difficult to definitively determine the cause of side effects and/or exacerbation of symptoms if they occur, so discussed that we will pause tapering Lamictal at this time.  Discussed with patient that if she tolerates beginning Adderall XR and has not experience any side effects and/or exacerbation of symptoms we will likely resume tapering of Lamictal at that time as it appears previous diagnosis of bipolar disorder is inaccurate and this medication could be contributing to fatigue.  Again discussed with patient that while it appears that previous diagnosis of bipolar disorder is inaccurate, if she notices any destabilization of mood and/or exacerbation of symptoms with starting Adderall or tapering off of Lamictal when this is resumed, she should notify this APRN immediately.  The patient verbalizes understanding and endorses that she is agreeable to this.  -Continue Lexapro 20 mg daily for depression/anxiety        MEDICATION ISSUES: Discussed medication options and treatment plan of prescribed medication, any off label use of medication, as well as the risks, benefits, any black box warnings including increased suicidality, and  side effects including but not limited to potential falls, dizziness, possible impaired driving, GI side effects (change in appetite, abdominal discomfort, nausea, vomiting, diarrhea, and/or constipation), dry mouth, somnolence, sedation, insomnia, activation, agitation, irritation, tremors, abnormal muscle movements or disorders, headache, sweating, possible bruising or rare bleeding, electrolyte and/or fluid abnormalities, change in blood pressure/heart rate/and or heart rhythm, sexual dysfunction, and metabolic adversities among others. Patient and/or guardian agreeable to call the office with any worsening of symptoms or onset of side effects, or if any concerns or questions arise.  The contact information for the office is made available to the patient and/or guardian.  Patient and/or guardian agreeable to call 911 or go to the nearest ER should they begin having any SI/HI, or if any urgent concerns arise. No medication side effects or related complaints today.    This APRN has discussed the benefits and risks of taking Lamictal (Lamotrigine).  The side effects of Lamictal can include a benign rash, blurred or double vision, dizziness, ataxia, sedation, headache, tremor, insomnia, poor coordination, fatigue,  nausea, vomiting, dyspepsia, rhinitis, infection, pharyngitis, asthenia, a rare but serious rash, rare multi-organ failure associated with Moraes-Juan F Syndrome, toxic epidermal necrolysis, drug hypersensitivity syndrome, rare blood dyscrasias, rare aseptic meningitis, rare sudden unexplained deaths in people with epilepsy, withdrawal seizures upon abrupt withdrawal, and rare activation of suicidal ideation and behavior (suicidality).  This APRN has discussed that a very slow dose titration when starting, or changing doses, of Lamictal may reduce the incidence of skin rash and other side effects.  The dosage should not be titrated upwards or increased faster than recommended due to the possibility of  the discussed side effects and risk of development of a skin rash (which can become life threatening).    This APRN has also discussed that if the patient stops taking the Lamictal for 5 days or longer, it will be necessary to restart the drug with an initial dose titration, as rashes have been reported on reexposure.  If the patient/guardian and Provider decide to stop the Lamictal, the patient/guardian will follow the directions of this APRN/this office as a guided taper over about two weeks is appropriate due to the risk of relapse in bipolar disorder with those with bipolar disorder, the risk of seizures in those with epilepsy, and discontinuation symptoms upon rapid discontinuation of Lamictal.    The patient/guardian verbalizes understanding of benefits and risks as discussed, the patient/guardian feels the benefits outweigh the risks and is agreeable to continue/take Lamictal as discussed.  The patient/guardian is advised should any side effects or rash develops they are to stop the Lamictal immediately and contact this APRN/this office or go to the emergency department immediately.  The patient/guardian verbalizes understanding and agreement with treatment plan in their own words.    The patient is being prescribed a controlled substance as part of the treatment plan. The patient/guardian has been educated of appropriate use of the medication(s), including risks and side effects such as insomnia, headache, exacerbation of tics, nervousness, irritability, overstimulation, tremor, dizziness, anorexia or change in appetite, nausea, dry mouth, constipation, diarrhea, weight loss, sexual dysfunction, psychotic episodes, seizures, palpitations, tachycardia, hypertension, rare activation (activation of hypomania, rozina, and/or suicidal ideations), cardiovascular adverse effects including sudden death (especially patients with pre-existing structural abnormalities often associated with a family history of cardiac  disease), may slow normal growth in children, weight gain is reported but not expected, sedation is possible but activation is much more common, metabolic adversities, and overdose among others. Patient/guardian is also informed that the medication is to be used by the patient only, the medication is to be used only as directed, and the medication should not be combined with other substances unless directed by a Provider/Prescriber. The patient/guardian verbalized understanding and agreement with this in their own words, and wish to continue with current treatment plan.                      [facility] No Show Policy:  We understand unexpected circumstances arise; however, anytime you miss your appointment we are unable to provide you appropriate care.  In addition, each appointment missed could have been used to provide care for others.  We ask that you call at least 24 hours in advance to cancel or reschedule an appointment.  We would like to take this opportunity to remind you of our policy stating patients who miss THREE or more appointments without cancelling or rescheduling 24 hours in advance of the appointment may be subject to cancellation of any further visits with our clinic and recommendation to seek in-person services/visits.    Please call [phone] to reschedule your appointment. If there are reasons that make it difficult for you to keep the appointments, please call and let us know how we can help.  Please understand that medication prescribing will not continue without seeing your provider.      [facility]'s No Show Policy reviewed with patient at today's visit. Patient verbalized understanding of this policy. Discussed with patient that in the event that there are three or more no show visits, it will be recommended that they pursue in-person services/visits as noncompliance with telehealth visits indicates that patient is not an appropriate  candidate for telemedicine and would likely be more appropriate for in-person services/visits. Patient verbalizes understanding and is agreeable to this.           MEDS ORDERED DURING VISIT:  New Medications Ordered This Visit   Medications   • lamoTRIgine (LaMICtal) 100 MG tablet     Sig: Take 1 tablet by mouth Every Morning.     Dispense:  30 tablet     Refill:  0   • amphetamine-dextroamphetamine XR (ADDERALL XR) 20 MG 24 hr capsule     Sig: Take 1 capsule by mouth Every Morning     Dispense:  30 capsule     Refill:  0       Return in about 4 weeks (around 8/2/2022), or if symptoms worsen or fail to improve, for Recheck.        Patient will follow-up in 4 weeks, highly encouraged the patient if she had any questions or concerns to contact the behavioral health virtual clinic for sooner appointment patient verbalized understanding.      Functional Status: Moderate impairment     Prognosis: Fair with Ongoing Treatment             This document has been electronically signed by VIVIANE Dash  July 5, 2022 10:47 EDT      Some of the data in this electronic note has been brought forward from a previous encounter, any necessary changes have been made, it has been reviewed by this APRN, and it is accurate.       Part of this note may be an electronic transcription/translation of spoken language to printed text using the Dragon Dictation System.

## 2022-07-13 RX ORDER — HYDROCHLOROTHIAZIDE 25 MG/1
TABLET ORAL
Qty: 90 TABLET | Refills: 1 | Status: SHIPPED | OUTPATIENT
Start: 2022-07-13 | End: 2022-11-16 | Stop reason: SDUPTHER

## 2022-07-24 DIAGNOSIS — E11.9 DIABETES MELLITUS, NEW ONSET: ICD-10-CM

## 2022-07-25 ENCOUNTER — OFFICE VISIT (OUTPATIENT)
Dept: INTERNAL MEDICINE | Facility: CLINIC | Age: 27
End: 2022-07-25

## 2022-07-25 VITALS
WEIGHT: 218 LBS | TEMPERATURE: 98.4 F | HEART RATE: 88 BPM | BODY MASS INDEX: 34.21 KG/M2 | DIASTOLIC BLOOD PRESSURE: 76 MMHG | SYSTOLIC BLOOD PRESSURE: 116 MMHG | HEIGHT: 67 IN

## 2022-07-25 DIAGNOSIS — Z11.59 ENCOUNTER FOR HEPATITIS C SCREENING TEST FOR LOW RISK PATIENT: ICD-10-CM

## 2022-07-25 DIAGNOSIS — I10 BENIGN ESSENTIAL HYPERTENSION: ICD-10-CM

## 2022-07-25 DIAGNOSIS — E11.9 TYPE 2 DIABETES MELLITUS WITHOUT COMPLICATION, WITHOUT LONG-TERM CURRENT USE OF INSULIN: Primary | ICD-10-CM

## 2022-07-25 DIAGNOSIS — R05.1 ACUTE COUGH: ICD-10-CM

## 2022-07-25 DIAGNOSIS — Z23 NEED FOR PROPHYLACTIC VACCINATION WITH STREPTOCOCCUS PNEUMONIAE (PNEUMOCOCCUS) AND INFLUENZA VACCINES: ICD-10-CM

## 2022-07-25 DIAGNOSIS — E03.9 HYPOTHYROIDISM, UNSPECIFIED TYPE: ICD-10-CM

## 2022-07-25 DIAGNOSIS — N92.6 ABNORMAL MENSTRUAL PERIODS: ICD-10-CM

## 2022-07-25 PROBLEM — E66.811 OBESITY (BMI 30.0-34.9): Status: ACTIVE | Noted: 2022-07-25

## 2022-07-25 PROBLEM — E66.9 OBESITY (BMI 30.0-34.9): Status: ACTIVE | Noted: 2022-07-25

## 2022-07-25 LAB
EXPIRATION DATE: ABNORMAL
EXPIRATION DATE: NORMAL
FLUAV AG UPPER RESP QL IA.RAPID: NOT DETECTED
FLUBV AG UPPER RESP QL IA.RAPID: NOT DETECTED
HBA1C MFR BLD: 6.2 % (ref 4.8–5.6)
INTERNAL CONTROL: NORMAL
Lab: ABNORMAL
Lab: NORMAL
SARS-COV-2 RNA RESP QL NAA+PROBE: NOT DETECTED

## 2022-07-25 PROCEDURE — 90471 IMMUNIZATION ADMIN: CPT | Performed by: NURSE PRACTITIONER

## 2022-07-25 PROCEDURE — 83036 HEMOGLOBIN GLYCOSYLATED A1C: CPT | Performed by: NURSE PRACTITIONER

## 2022-07-25 PROCEDURE — 99214 OFFICE O/P EST MOD 30 MIN: CPT | Performed by: NURSE PRACTITIONER

## 2022-07-25 PROCEDURE — 87636 SARSCOV2 & INF A&B AMP PRB: CPT | Performed by: NURSE PRACTITIONER

## 2022-07-25 PROCEDURE — 90677 PCV20 VACCINE IM: CPT | Performed by: NURSE PRACTITIONER

## 2022-07-25 NOTE — TELEPHONE ENCOUNTER
Rx Refill Note  Requested Prescriptions     Pending Prescriptions Disp Refills   • metFORMIN (GLUCOPHAGE) 500 MG tablet [Pharmacy Med Name: metFORMIN  MG TABLET] 90 tablet 1     Sig: TAKE ONE TABLET BY MOUTH DAILY      Last office visit with prescribing clinician: 4/1/2022      Next office visit with prescribing clinician: 7/25/2022            Kathie Vilchis RN  07/25/22, 10:26 EDT

## 2022-07-25 NOTE — PROGRESS NOTES
Isabell Oakes  1995  [id number]  Patient Care Team:  Yamilex Khan APRN as PCP - General (Internal Medicine)    Isabell Oakes is a pleasant 26 y.o. female who presents for evaluation of Hypertension, Diabetes, and Sinus Problem (Cough, congestion )    Chief Complaint   Patient presents with   • Hypertension   • Diabetes   • Sinus Problem     Cough, congestion        HPI:   Juanjo has a pertinent medical history including diabetes, hypothyroidism, hypertension, hyperlipidemia, morbid obesity, and psoriasis. She is working full time at [employer] after resigning from full time job as a .  She sees mental health provider at [facility]. She also sees a dietician, Idalia Katz.  She has acute URI sx, overall mild.    She describes her diet as well balanced with a few added treats because of summer. She exercises 2-3 times a week at [employer].  A1C today is 6.2, up from 5.6 in Jan.  Had normal eye exam in March.    She does not check her BP at home. She does not check her BS at home.    Juanjo is in need of a GYN referral.    Past Medical History:   Diagnosis Date   • Anxiety    • Bipolar disorder (HCC)    • Depression    • Diabetes mellitus type II, controlled (HCC)    • Disease of thyroid gland    • Hypertension      Past Surgical History:   Procedure Laterality Date   • TONSILLECTOMY  2001     Family History   Problem Relation Age of Onset   • Hypertension Mother    • Bipolar disorder Father    • Depression Father    • Arthritis Maternal Grandmother    • Depression Maternal Grandmother    • Hypertension Maternal Grandmother    • Hyperlipidemia Maternal Grandmother    • Cancer Maternal Grandfather         melanoma?   • Arthritis Paternal Grandmother    • Depression Paternal Grandmother    • Hypertension Paternal Grandmother    • Cancer Paternal Grandfather         lung (smoker)   • Heart disease Paternal Grandfather      Social History     Tobacco Use   Smoking  "Status Never Smoker   Smokeless Tobacco Never Used     No Known Allergies    Current Outpatient Medications:   •  amphetamine-dextroamphetamine XR (ADDERALL XR) 20 MG 24 hr capsule, Take 1 capsule by mouth Every Morning, Disp: 30 capsule, Rfl: 0  •  escitalopram (LEXAPRO) 20 MG tablet, Take 1 tablet by mouth Every Morning., Disp: 90 tablet, Rfl: 0  •  Tessa 1.5/30 1.5-30 MG-MCG tablet, TAKE ONE TABLET BY MOUTH DAILY, Disp: 63 tablet, Rfl: 1  •  hydroCHLOROthiazide (HYDRODIURIL) 25 MG tablet, TAKE ONE TABLET BY MOUTH DAILY, Disp: 90 tablet, Rfl: 1  •  lamoTRIgine (LaMICtal) 100 MG tablet, Take 1 tablet by mouth Every Morning., Disp: 30 tablet, Rfl: 0  •  levothyroxine (SYNTHROID, LEVOTHROID) 75 MCG tablet, TAKE ONE TABLET BY MOUTH DAILY, Disp: 90 tablet, Rfl: 1  •  Tremfya 100 MG/ML solution pen-injector, Every 3 (Three) Months., Disp: , Rfl:   •  metFORMIN (GLUCOPHAGE) 500 MG tablet, TAKE ONE TABLET BY MOUTH DAILY, Disp: 90 tablet, Rfl: 1    Review of Systems   Constitutional: Negative for fatigue.   HENT: Positive for rhinorrhea, sinus pressure and sneezing. Negative for sore throat.    Respiratory: Negative for apnea, cough, chest tightness, shortness of breath and wheezing.    Cardiovascular: Negative for chest pain, palpitations and leg swelling.   Gastrointestinal: Negative for constipation, diarrhea, nausea, vomiting and GERD.   Neurological: Negative for weakness and headache.   Psychiatric/Behavioral: Negative for stress.     Review of systems was completed, and pertinent findings are noted in the HPI.  /76 (BP Location: Left arm, Patient Position: Sitting, Cuff Size: Adult)   Pulse 88   Temp 98.4 °F (36.9 °C) (Temporal)   Ht 170 cm (66.93\")   Wt 98.9 kg (218 lb)   BMI 34.22 kg/m²     Physical Exam  Constitutional:       Appearance: She is well-developed.   HENT:      Head: Normocephalic and atraumatic.      Comments: *wearing mask  Eyes:      Conjunctiva/sclera: Conjunctivae normal.      Pupils: " Pupils are equal, round, and reactive to light.   Cardiovascular:      Rate and Rhythm: Normal rate and regular rhythm.      Pulses: Normal pulses.      Heart sounds: Normal heart sounds.   Pulmonary:      Effort: Pulmonary effort is normal.      Breath sounds: Normal breath sounds.   Musculoskeletal:         General: Normal range of motion.      Cervical back: Normal range of motion and neck supple.   Skin:     General: Skin is warm and dry.   Neurological:      Mental Status: She is alert and oriented to person, place, and time.   Psychiatric:         Mood and Affect: Mood normal.         Behavior: Behavior normal.         Thought Content: Thought content normal.         Judgment: Judgment normal.         Procedures    PHQ-9 Total Score:      Assessment/Plan:  Diagnoses and all orders for this visit:    1. Type 2 diabetes mellitus without complication, without long-term current use of insulin (HCC) (Primary)  Comments:  Start taking blood sugars regularly and record. Continue metformin 500 mg daily.  Orders:  -     POC Glycosylated Hemoglobin (Hb A1C)  -     Lipid Panel; Future  -     Microalbumin / Creatinine Urine Ratio - Urine, Clean Catch; Future    2. Acute cough  Comments:  Covid test today neg.  continue otc meds for sx mgmt  Orders:  -     SHIRAZ FLU + SARS PCR    3. Benign essential hypertension  Comments:  Monitor BP regularly and record.  Continue hydrochlorothiazide 25 mg daily.  Orders:  -     Comprehensive Metabolic Panel; Future  -     Microalbumin / Creatinine Urine Ratio - Urine, Clean Catch; Future    4. Hypothyroidism, unspecified type  Comments:  Continue Synthroid 75 mcg daily.  Orders:  -     TSH Rfx On Abnormal To Free T4; Future    5. Need for prophylactic vaccination with Streptococcus pneumoniae (Pneumococcus) and Influenza vaccines  -     Pneumococcal Conjugate Vaccine 20-Valent All    6. Encounter for hepatitis C screening test for low risk patient  -     Hepatitis C Antibody;  Future    7. Abnormal menstrual periods  -     Ambulatory Referral to Gynecology       There are no Patient Instructions on file for this visit.  Plan of care reviewed with patient at the conclusion of today's visit. Education was provided regarding diagnosis, management and any prescribed or recommended OTC medications.  Patient verbalizes understanding of and agreement with management plan.    Return in about 3 months (around 10/25/2022) for Annual physical.    Dictated Utilizing Dragon Dictation.    VIVIANE Rowan          Answers for HPI/ROS submitted by the patient on 7/25/2022  What is the primary reason for your visit?: Physical

## 2022-08-02 ENCOUNTER — TELEMEDICINE (OUTPATIENT)
Dept: PSYCHIATRY | Facility: CLINIC | Age: 27
End: 2022-08-02

## 2022-08-02 DIAGNOSIS — F33.42 RECURRENT MAJOR DEPRESSIVE DISORDER, IN FULL REMISSION: Chronic | ICD-10-CM

## 2022-08-02 DIAGNOSIS — F90.8 ADHD, ADULT RESIDUAL TYPE: Primary | Chronic | ICD-10-CM

## 2022-08-02 DIAGNOSIS — F51.04 INSOMNIA, PSYCHOPHYSIOLOGICAL: Chronic | ICD-10-CM

## 2022-08-02 DIAGNOSIS — F41.1 GENERALIZED ANXIETY DISORDER: Chronic | ICD-10-CM

## 2022-08-02 PROCEDURE — 99214 OFFICE O/P EST MOD 30 MIN: CPT | Performed by: NURSE PRACTITIONER

## 2022-08-02 RX ORDER — CLONIDINE HYDROCHLORIDE 0.1 MG/1
0.1 TABLET ORAL NIGHTLY
Qty: 30 TABLET | Refills: 0 | Status: SHIPPED | OUTPATIENT
Start: 2022-08-02 | End: 2022-09-06

## 2022-08-02 RX ORDER — DEXTROAMPHETAMINE SACCHARATE, AMPHETAMINE ASPARTATE MONOHYDRATE, DEXTROAMPHETAMINE SULFATE AND AMPHETAMINE SULFATE 5; 5; 5; 5 MG/1; MG/1; MG/1; MG/1
20 CAPSULE, EXTENDED RELEASE ORAL EVERY MORNING
Qty: 30 CAPSULE | Refills: 0 | Status: SHIPPED | OUTPATIENT
Start: 2022-08-02 | End: 2022-09-08

## 2022-08-02 RX ORDER — LAMOTRIGINE 25 MG/1
TABLET ORAL
Qty: 30 TABLET | Refills: 0 | Status: SHIPPED | OUTPATIENT
Start: 2022-08-02 | End: 2022-08-29

## 2022-08-02 NOTE — PROGRESS NOTES
"This provider is located at the [facility] (formerly [facility]3] (through [facility]), [address], [address] using a secure [employer] Video Visit through [employer]. Patient is being seen remotely via telehealth at their home address in [place], and stated they are in a secure environment for this session. The patient's condition being diagnosed/treated is appropriate for telemedicine. The provider identified herself as well as her credentials. The patient, and/or patients guardian, consent to be seen remotely, and when consent is given they understand that the consent allows for patient identifiable information to be sent to a third party as needed. They may refuse to be seen remotely at any time. The electronic data is encrypted and password protected, and the patient and/or guardian has been advised of the potential risks to privacy not withstanding such measures.    You have chosen to receive care through a telehealth visit.  Do you consent to use a video/audio connection for your medical care today? Yes     Subjective   Isabell Oakes is a 26 y.o. female who is here today for medication management follow up.    Chief Complaint: ADHD, depression, anxiety    History of Present Illness:  The patient describes her mood as \"good\" over the last few weeks.  The patient endorses that she had a career change recently.  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  The patient endorses that she is very happy in her new job and this has been going really well.  The patient endorses that she has been doing really well over the past few weeks.  The patient states that she really likes the Adderall XR compared to the Adderall IR because she only has to take it once daily compared to having to remember to take the Adderall IR twice " "daily.  The patient endorses that she has tolerated the Adderall XR well and denies any side effects.  The patient denies any destabilization of mood or symptoms consistent with hypomania/rozina.  The patient endorses that the increased dose of Adderall XR has been more helpful for managing ADHD symptoms, but states that she does continue to have some difficulty with the symptoms.  The patient states that specifically she has noticed she is still struggling with impulse control, specifically when conversation.  The patient states for example that when she is conversation she wants herself to stop talking, but states that she has difficulty actually stopping herself.  The patient endorses that she also has noticed that she has difficulty with \"attention to detail\" and getting distracted easily.  The patient endorses that she feels that the symptoms have been much more well-controlled compared to baseline since beginning the Adderall XR, but endorses that these are the symptoms that are still most concerning for her at this time.  She endorses that she has been making lists at work to stay on task and ensure that she completes everything she wants to do to help manage this, which she endorses has been effective.  The patient endorses that the Adderall seems to be effective for approximately 8 to 10 hours after taking the medication, so endorses that it is effective throughout her workday.  The patient endorses that she would like to eventually discuss increasing Adderall XR for further management of ADHD symptoms, but states that she feels that she is \"at a good place\" in regards to management of ADHD symptoms with the Adderall XR at current dose and endorses that she would prefer to resume tapering of Lamictal at this time.  The patient reports her appetite as good.  The patient reports her sleep as fair.  The patient endorses that she falls asleep easily, but states that she awakens frequently throughout the night.  " "The patient states that when she does awaken she can be back asleep easily, but states that it is difficult for her to maintain sleep for extended periods of time without awakening.  The patient endorses that she is unsure why she would consider the night, but states that she has done this ever since she was a child.  The patient endorses that she has been taking melatonin and that sometimes it seems to be more effective than others, but states that recently she does not feel that it has been very effective and she has continued to have multiple nighttime awakenings.  The patient denies any nightmares or bad dreams.  The patient denies any new medical problems or changes in medications since last appointment with this facility.  The patient reports compliance with current medication regimen.  The patient denies any current side effects from her current medication regimen.  The patient denies any abnormal muscle movements or tics.  The patient rates her ADHD at a 5/10 on a 0-10 scale, with 10 being the worst.  The patient rates her depression at a 0/10 on a 0-10 scale, with 10 being the worst.  The patient rates her anxiety at a 4-5/10 on a 0-10 scale, with 10 being the worst.  The patient endorses that she has continued to have some anxiety, but denies any recent worsening of symptoms.  The patient states that she feels that anxiety \"is just always there\".  The patient endorses that she has noticed that her anxiety is more situational now and \"not constant like it used to be\".  The patient endorses that overall she does feel that anxiety has been more well-controlled as her ADHD symptoms have become more well-controlled.  The patient rates hopelessness at a 0/10 on a 0-10 scale with 10 being the worst.  The patient denies suicidal ideations and homicidal ideations, and is convincing.  The patient denies any auditory hallucinations or visual hallucinations.  The patient does not endorse significant symptoms " "consistent with bipolar disorder or a psychotic illness.                LMP:  States that she doesn't have menstrual cycles due to her birth control.  The patient denies any chance of pregnancy at this time.  The patient was educated that her prescribed medications can have potential risk to a developing fetus. The patient is advised to contact this APRN/this office if she becomes pregnant or plans to become pregnant.  Pt verbalizes understanding and acknowledged agreement with this plan in her own words.        Prior Psychiatric Medications:  Zoloft - initially it was effective for depression and anxiety, but endorses that eventually it lost efficacy and she was switched to Lexapro.  Vraylar - caused severe exacerbation of anxiety  Latuda - reports made her \"feel weird\", caused excessive sedation and GI side effects  Wellbutrin  mg daily - ineffective for ADHD and increased anxiety   Strattera 25 mg daily - ineffective for ADHD and caused fatigue   Qelbree - caused significant nausea      The following portions of the patient's history were reviewed and updated as appropriate: allergies, current medications, past family history, past medical history, past social history, past surgical history and problem list.    Review of Systems   Constitutional: Negative for activity change, appetite change, fatigue and unexpected weight change.   Psychiatric/Behavioral: Positive for decreased concentration. Negative for agitation, behavioral problems, confusion, dysphoric mood, hallucinations, self-injury, sleep disturbance and suicidal ideas. The patient is nervous/anxious and is hyperactive.        Objective   Physical Exam  Constitutional:       Appearance: Normal appearance.   Neurological:      Mental Status: She is alert.   Psychiatric:         Attention and Perception: Perception normal. She is inattentive.         Mood and Affect: Affect normal. Mood is anxious.         Speech: Speech normal.         Behavior: " Behavior normal. Behavior is cooperative.         Thought Content: Thought content normal. Thought content does not include homicidal or suicidal ideation. Thought content does not include homicidal or suicidal plan.         Cognition and Memory: Cognition and memory normal.         Judgment: Judgment normal.       There were no vitals taken for this visit.    The patient was seen remotely today via a MyChart [facility].  Unable to obtain vital signs due to nature of remote visit.  Height stated at 67 inches.  Weight stated at 218 pounds.     No Known Allergies    Recent Results (from the past 2016 hour(s))   Urine Drug Screen - Urine, Clean Catch    Collection Time: 06/02/22  2:56 PM    Specimen: Urine, Clean Catch   Result Value Ref Range    THC, Screen, Urine Negative Negative    Phencyclidine (PCP), Urine Negative Negative    Cocaine Screen, Urine Negative Negative    Methamphetamine, Ur Negative Negative    Opiate Screen Negative Negative    Amphetamine Screen, Urine Negative Negative    Benzodiazepine Screen, Urine Negative Negative    Tricyclic Antidepressants Screen Negative Negative    Methadone Screen, Urine Negative Negative    Barbiturates Screen, Urine Negative Negative    Oxycodone Screen, Urine Negative Negative    Propoxyphene Screen Negative Negative    Buprenorphine, Screen, Urine Negative Negative   POC Glycosylated Hemoglobin (Hb A1C)    Collection Time: 07/25/22 11:50 AM    Specimen: Blood   Result Value Ref Range    Hemoglobin A1C 6.2 (A) 4.8 - 5.6 %    Lot Number [id number]     Expiration Date 03/15/2024    SHIRAZ FLU + SARS PCR    Collection Time: 07/25/22 11:52 AM    Specimen: Nasal Cavity; Swab   Result Value Ref Range    COVID19 Not Detected Not Detected - Ref. Range    Influenza A Antigen ANN Not Detected Not Detected    Influenza B Antigen ANN Not Detected Not Detected    Internal Control Passed Passed    Lot Number [id number]     Expiration Date 2/4/23        Current  Medications:   Current Outpatient Medications   Medication Sig Dispense Refill   • amphetamine-dextroamphetamine XR (ADDERALL XR) 20 MG 24 hr capsule Take 1 capsule by mouth Every Morning 30 capsule 0   • escitalopram (LEXAPRO) 20 MG tablet Take 1 tablet by mouth Every Morning. 90 tablet 0   • Tessa 1.5/30 1.5-30 MG-MCG tablet TAKE ONE TABLET BY MOUTH DAILY 63 tablet 1   • hydroCHLOROthiazide (HYDRODIURIL) 25 MG tablet TAKE ONE TABLET BY MOUTH DAILY 90 tablet 1   • lamoTRIgine (LaMICtal) 25 MG tablet Take 2 tablets by mouth Daily x2 weeks then discontinue. 30 tablet 0   • levothyroxine (SYNTHROID, LEVOTHROID) 75 MCG tablet TAKE ONE TABLET BY MOUTH DAILY 90 tablet 1   • metFORMIN (GLUCOPHAGE) 500 MG tablet TAKE ONE TABLET BY MOUTH DAILY 90 tablet 1   • Tremfya 100 MG/ML solution pen-injector Every 3 (Three) Months.     • cloNIDine (CATAPRES) 0.1 MG tablet Take 1 tablet by mouth Every Night. 30 tablet 0     No current facility-administered medications for this visit.       Mental Status Exam:   Hygiene:   good  Cooperation:  Cooperative  Eye Contact:  Good  Psychomotor Behavior:  Appropriate  Affect:  Full range  Hopelessness: Denies  Speech:  Normal  Thought Process:  Goal directed  Thought Content:  Normal  Suicidal:  None  Homicidal:  None  Hallucinations:  None  Delusion:  None  Memory:  Intact  Orientation:  Person, Place, Time and Situation  Reliability:  good  Insight:  Good  Judgement:  Good  Impulse Control:  Good  Physical/Medical Issues:  Yes See medical history        Assessment & Plan   Diagnoses and all orders for this visit:    1. ADHD, adult residual type (Primary)  -     amphetamine-dextroamphetamine XR (ADDERALL XR) 20 MG 24 hr capsule; Take 1 capsule by mouth Every Morning  Dispense: 30 capsule; Refill: 0  -     cloNIDine (CATAPRES) 0.1 MG tablet; Take 1 tablet by mouth Every Night.  Dispense: 30 tablet; Refill: 0    2. Generalized anxiety disorder  -     lamoTRIgine (LaMICtal) 25 MG tablet; Take 2  tablets by mouth Daily x2 weeks then discontinue.  Dispense: 30 tablet; Refill: 0  -     cloNIDine (CATAPRES) 0.1 MG tablet; Take 1 tablet by mouth Every Night.  Dispense: 30 tablet; Refill: 0    3. Recurrent major depressive disorder, in full remission (HCC)  -     lamoTRIgine (LaMICtal) 25 MG tablet; Take 2 tablets by mouth Daily x2 weeks then discontinue.  Dispense: 30 tablet; Refill: 0    4. Insomnia, psychophysiological  -     cloNIDine (CATAPRES) 0.1 MG tablet; Take 1 tablet by mouth Every Night.  Dispense: 30 tablet; Refill: 0            Visit Diagnoses:    ICD-10-CM ICD-9-CM   1. ADHD, adult residual type  F90.8 314.01   2. Generalized anxiety disorder  F41.1 300.02   3. Recurrent major depressive disorder, in full remission (HCC)  F33.42 296.36   4. Insomnia, psychophysiological  F51.04 307.42       GOALS:  Short Term Goals: Patient will be compliant with medication, and patient will have no significant medication related side effects.  Patient will be engaged in psychotherapy as indicated.  Patient will report subjective improvement of symptoms.  Long term goals: To stabilize mood and treat/improve subjective symptoms, the patient will stay out of the hospital, the patient will be at an optimal level of functioning, and the patient will take all medications as prescribed.  The patient verbalized understanding and agreement with goals that were mutually set.      SUICIDE RISK ASSESSMENT: Unalterable demographics and a history of mental health intervention indicate this patient is in a high risk category compared to the general population. At present, the patient denies active SI/HI, intentions, or plans at this time and agrees to seek immediate care should such thoughts develop. The patient verbalizes understanding of how to access emergency care if needed and agrees to do so. Consideration of suicide risk and protective factors such as history, current presentation, individual strengths and weaknesses,  psychosocial and environmental stressors and variables, psychiatric illness and symptoms, medical conditions and pain, took place in this interview. Based on those considerations, the patient is determined: within individual baseline and presenting no imminent risk for suicide or homicide. Other recommendations: The patient does not meet the criteria for inpatient admission and is not a safety risk to self or others at today's visit. Inpatient treatment offers no significant advantages over outpatient treatment for this patient at today's visit.      SAFETY PLAN:  Patient was given ample time for questions and fully participated in treatment planning.  Patient was encouraged to call the clinic with any questions or concerns.  Patient was informed of access to emergency care. If patient were to develop any significant symptomatology, suicidal ideation, homicidal ideation, any concerns, or feel unsafe at any time they are to call the clinic and if unable to get immediate assistance should immediately call 911 or go to the nearest emergency room.  The patient is advised to remove or secure (lock away) all lethal weapons (including guns) and sharps (including razors, scissors, knives, etc.).  All medications (including any prescribed and any over the counter medications) should be stored in a safe and secured location that is not obtainable by children/adolescents.  Patient was given an opportunity and encouraged to ask questions about their medication, illness, and treatment. Patient contracted verbally for the following: If you are experiencing an emotional crisis or have thoughts of harming yourself or others, please go to your nearest local emergency room or call 911. Will continue to re-assess medication response and side effects frequently to establish efficacy and ensure safety. Risks, any black box warnings, side effects, off label usage, and benefits of medication and treatment discussed with patient, along  with potential adverse side effects of current and/or newly prescribed medication, alternative treatment options, and OTC medications.  Patient verbalized understanding of potential risks, any off label use of medication, any black box warnings, and any side effects in their own words. The patient verbalized understanding and agreed to comply with the safety plan discussed in their own words.  Patient given the number to the office. Number also available to the 24- hour suicide hotline.       TREATMENT PLAN/GOALS: Continue medications and treatment plan as indicated. Treatment and medication options discussed during today's visit. Patient acknowledged and verbally consented to continue with current treatment plan and was educated on the importance of compliance with treatment and follow-up appointments.        -Decrease Lamictal to 50 mg daily x2 weeks then discontinue.  Discussed with the patient that we will resume tapering off of Lamictal as tolerated at this time as she has tolerated starting the Adderall XR very well and has not experienced any mood destabilization or worsening of symptoms.  Again discussed with the patient that it does not appear that previous diagnosis of bipolar disorder is accurate and symptoms are better explain by diagnosis of ADHD rather than bipolar disorder, which is why we are choosing to taper off of Lamictal as tolerated and treating her for ADHD.  Again discussed with patient that while it appears that previous diagnosis of bipolar disorder is inaccurate, if she notices any destabilization of mood and/or exacerbation of symptoms with tapering off of Lamictal, she should notify this APRN immediately.  The patient verbalizes understanding and endorses that she is agreeable to this.  -Begin Clonidine 0.1 mg nightly for insomnia and as adjunct for ADHD/anxiety   -Continue Adderall XR 20 mg daily for ADHD.  Discussed with the patient that we will first determine how she tolerates  tapering off of Lamictal before making any adjustments with her Adderall XR as she has endorsed that she would prefer to resume tapering off of Lamictal at this time.  Discussed with patient that if she tolerates this well and mood remains stable and depression/anxiety continue to be well-controlled, we will discuss potentially increasing Adderall XR for further management of ADHD symptoms as tolerated if necessary at that time.  The patient verbalizes understanding and endorses that she is agreeable to this.  -Continue Lexapro 20 mg daily for depression/anxiety        MEDICATION ISSUES: Discussed medication options and treatment plan of prescribed medication, any off label use of medication, as well as the risks, benefits, any black box warnings including increased suicidality, and side effects including but not limited to potential falls, dizziness, possible impaired driving, GI side effects (change in appetite, abdominal discomfort, nausea, vomiting, diarrhea, and/or constipation), dry mouth, somnolence, sedation, insomnia, activation, agitation, irritation, tremors, abnormal muscle movements or disorders, headache, sweating, possible bruising or rare bleeding, electrolyte and/or fluid abnormalities, change in blood pressure/heart rate/and or heart rhythm, sexual dysfunction, and metabolic adversities among others. Patient and/or guardian agreeable to call the office with any worsening of symptoms or onset of side effects, or if any concerns or questions arise.  The contact information for the office is made available to the patient and/or guardian.  Patient and/or guardian agreeable to call 911 or go to the nearest ER should they begin having any SI/HI, or if any urgent concerns arise. No medication side effects or related complaints today.    This APRN has discussed the benefits and risks of taking Lamictal (Lamotrigine).  The side effects of Lamictal can include a benign rash, blurred or double vision,  dizziness, ataxia, sedation, headache, tremor, insomnia, poor coordination, fatigue,  nausea, vomiting, dyspepsia, rhinitis, infection, pharyngitis, asthenia, a rare but serious rash, rare multi-organ failure associated with Moraes-Juan F Syndrome, toxic epidermal necrolysis, drug hypersensitivity syndrome, rare blood dyscrasias, rare aseptic meningitis, rare sudden unexplained deaths in people with epilepsy, withdrawal seizures upon abrupt withdrawal, and rare activation of suicidal ideation and behavior (suicidality).  This APRN has discussed that a very slow dose titration when starting, or changing doses, of Lamictal may reduce the incidence of skin rash and other side effects.  The dosage should not be titrated upwards or increased faster than recommended due to the possibility of the discussed side effects and risk of development of a skin rash (which can become life threatening).    This APRN has also discussed that if the patient stops taking the Lamictal for 5 days or longer, it will be necessary to restart the drug with an initial dose titration, as rashes have been reported on reexposure.  If the patient/guardian and Provider decide to stop the Lamictal, the patient/guardian will follow the directions of this APRN/this office as a guided taper over about two weeks is appropriate due to the risk of relapse in bipolar disorder with those with bipolar disorder, the risk of seizures in those with epilepsy, and discontinuation symptoms upon rapid discontinuation of Lamictal.    The patient/guardian verbalizes understanding of benefits and risks as discussed, the patient/guardian feels the benefits outweigh the risks and is agreeable to continue/take Lamictal as discussed.  The patient/guardian is advised should any side effects or rash develops they are to stop the Lamictal immediately and contact this APRN/this office or go to the emergency department immediately.  The patient/guardian verbalizes  understanding and agreement with treatment plan in their own words.    The patient is being prescribed a controlled substance as part of the treatment plan. The patient/guardian has been educated of appropriate use of the medication(s), including risks and side effects such as insomnia, headache, exacerbation of tics, nervousness, irritability, overstimulation, tremor, dizziness, anorexia or change in appetite, nausea, dry mouth, constipation, diarrhea, weight loss, sexual dysfunction, psychotic episodes, seizures, palpitations, tachycardia, hypertension, rare activation (activation of hypomania, rozina, and/or suicidal ideations), cardiovascular adverse effects including sudden death (especially patients with pre-existing structural abnormalities often associated with a family history of cardiac disease), may slow normal growth in children, weight gain is reported but not expected, sedation is possible but activation is much more common, metabolic adversities, and overdose among others. Patient/guardian is also informed that the medication is to be used by the patient only, the medication is to be used only as directed, and the medication should not be combined with other substances unless directed by a Provider/Prescriber. The patient/guardian verbalized understanding and agreement with this in their own words, and wish to continue with current treatment plan.                      [facility] No Show Policy:  We understand unexpected circumstances arise; however, anytime you miss your appointment we are unable to provide you appropriate care.  In addition, each appointment missed could have been used to provide care for others.  We ask that you call at least 24 hours in advance to cancel or reschedule an appointment.  We would like to take this opportunity to remind you of our policy stating patients who miss THREE or more appointments without cancelling or rescheduling 24 hours in advance of  the appointment may be subject to cancellation of any further visits with our clinic and recommendation to seek in-person services/visits.    Please call [phone] to reschedule your appointment. If there are reasons that make it difficult for you to keep the appointments, please call and let us know how we can help.  Please understand that medication prescribing will not continue without seeing your provider.      [facility]'s No Show Policy reviewed with patient at today's visit. Patient verbalized understanding of this policy. Discussed with patient that in the event that there are three or more no show visits, it will be recommended that they pursue in-person services/visits as noncompliance with telehealth visits indicates that patient is not an appropriate candidate for telemedicine and would likely be more appropriate for in-person services/visits. Patient verbalizes understanding and is agreeable to this.           MEDS ORDERED DURING VISIT:  New Medications Ordered This Visit   Medications   • amphetamine-dextroamphetamine XR (ADDERALL XR) 20 MG 24 hr capsule     Sig: Take 1 capsule by mouth Every Morning     Dispense:  30 capsule     Refill:  0   • lamoTRIgine (LaMICtal) 25 MG tablet     Sig: Take 2 tablets by mouth Daily x2 weeks then discontinue.     Dispense:  30 tablet     Refill:  0   • cloNIDine (CATAPRES) 0.1 MG tablet     Sig: Take 1 tablet by mouth Every Night.     Dispense:  30 tablet     Refill:  0       Return in about 4 weeks (around 8/30/2022), or if symptoms worsen or fail to improve, for Recheck.        Patient will follow-up in 4 weeks, highly encouraged the patient if she had any questions or concerns to contact the behavioral health virtual clinic for sooner appointment patient verbalized understanding.      Functional Status: Moderate impairment     Prognosis: Fair with Ongoing Treatment             This document has been electronically signed by Sherri  VIVIANE Zuñiga  August 2, 2022 11:36 EDT      Some of the data in this electronic note has been brought forward from a previous encounter, any necessary changes have been made, it has been reviewed by this APRN, and it is accurate.       Part of this note may be an electronic transcription/translation of spoken language to printed text using the Dragon Dictation System.

## 2022-08-29 ENCOUNTER — LAB (OUTPATIENT)
Dept: LAB | Facility: HOSPITAL | Age: 27
End: 2022-08-29

## 2022-08-29 ENCOUNTER — OFFICE VISIT (OUTPATIENT)
Dept: OBSTETRICS AND GYNECOLOGY | Facility: CLINIC | Age: 27
End: 2022-08-29

## 2022-08-29 VITALS
DIASTOLIC BLOOD PRESSURE: 88 MMHG | SYSTOLIC BLOOD PRESSURE: 130 MMHG | BODY MASS INDEX: 33.3 KG/M2 | HEIGHT: 67 IN | WEIGHT: 212.2 LBS

## 2022-08-29 DIAGNOSIS — R79.89 ELEVATED LFTS: ICD-10-CM

## 2022-08-29 DIAGNOSIS — R39.15 URINARY URGENCY: ICD-10-CM

## 2022-08-29 DIAGNOSIS — Z01.419 PAP TEST, AS PART OF ROUTINE GYNECOLOGICAL EXAMINATION: ICD-10-CM

## 2022-08-29 DIAGNOSIS — E13.9 DIABETES 1.5, MANAGED AS TYPE 2: ICD-10-CM

## 2022-08-29 DIAGNOSIS — E03.9 HYPOTHYROIDISM (ACQUIRED): ICD-10-CM

## 2022-08-29 DIAGNOSIS — E78.5 HYPERLIPIDEMIA, UNSPECIFIED HYPERLIPIDEMIA TYPE: ICD-10-CM

## 2022-08-29 DIAGNOSIS — R35.0 URINARY FREQUENCY: ICD-10-CM

## 2022-08-29 DIAGNOSIS — N89.8 VAGINAL DISCHARGE: ICD-10-CM

## 2022-08-29 DIAGNOSIS — Z01.411 ENCOUNTER FOR GYNECOLOGICAL EXAMINATION WITH ABNORMAL FINDING: Primary | ICD-10-CM

## 2022-08-29 DIAGNOSIS — I10 HYPERTENSION, UNSPECIFIED TYPE: ICD-10-CM

## 2022-08-29 DIAGNOSIS — R10.2 PELVIC PAIN: ICD-10-CM

## 2022-08-29 LAB
BILIRUB UR QL STRIP: NEGATIVE
CLARITY UR: CLEAR
COLOR UR: YELLOW
GLUCOSE UR STRIP-MCNC: NEGATIVE MG/DL
HGB UR QL STRIP.AUTO: NEGATIVE
KETONES UR QL STRIP: NEGATIVE
LEUKOCYTE ESTERASE UR QL STRIP.AUTO: NEGATIVE
NITRITE UR QL STRIP: NEGATIVE
PH UR STRIP.AUTO: 7 [PH] (ref 5–8)
PROT UR QL STRIP: NEGATIVE
SP GR UR STRIP: 1.02 (ref 1–1.03)
UROBILINOGEN UR QL STRIP: NORMAL

## 2022-08-29 PROCEDURE — 81003 URINALYSIS AUTO W/O SCOPE: CPT

## 2022-08-29 PROCEDURE — 99395 PREV VISIT EST AGE 18-39: CPT | Performed by: NURSE PRACTITIONER

## 2022-08-29 NOTE — PROGRESS NOTES
Chief Complaint  Isabell Oakes is a 26 y.o.  female presenting for Gynecologic Exam (New GYN, establish care.  Patient on birth control pills and has no menses.  /Concerned about vaginal malodor.  No itching, burning, or increased vaginal discharge.) and Abdominal Pain (LLQ pain, states that this is mostly mid-cycle (according to birth control pills).)    History of Present Illness  Juanjo is a very pleasant 27yo nulligravid woman who is here to establish gyn care.  She is new to our department.  She has been on COCPs for ~ 10 years.  She has amenorrhea on the current pill (30 mcg COCP).  We discussed her health conditions (HTN, well controlled; Hypothyroidism, compensated;  DM2, with recent A1C of 6.2; Elevated LFTs (7 mo ago, without follow up labs yet; she plans to do this week); Hyperlipidemia; and BMI 33.  We discussed Medical Eligibility Criteria, and that her risk factors increase the risks for thromboembolic events.  (She is a MEC3 for the HTN)  We discussed LARCs and progestin-only methods being safer.  She is leaning away from an IUD of any kind, as she has a lot of friends who have had problems with IUDs.  She was willing to take booklets re: Kyleena & Nexplanon.  We discussed POPs (97% efficacy) and DepoProvera (99%) also.      The following portions of the patient's history were reviewed and updated as appropriate: allergies, current medications, past family history, past medical history, past social history, past surgical history and problem list.    No Known Allergies      Current Outpatient Medications:   •  amphetamine-dextroamphetamine XR (ADDERALL XR) 20 MG 24 hr capsule, Take 1 capsule by mouth Every Morning, Disp: 30 capsule, Rfl: 0  •  cloNIDine (CATAPRES) 0.1 MG tablet, Take 1 tablet by mouth Every Night., Disp: 30 tablet, Rfl: 0  •  escitalopram (LEXAPRO) 20 MG tablet, Take 1 tablet by mouth Every Morning., Disp: 90 tablet, Rfl: 0  •  Tessa 1.5/30 1.5-30 MG-MCG tablet, TAKE  "ONE TABLET BY MOUTH DAILY, Disp: 63 tablet, Rfl: 1  •  hydroCHLOROthiazide (HYDRODIURIL) 25 MG tablet, TAKE ONE TABLET BY MOUTH DAILY, Disp: 90 tablet, Rfl: 1  •  levothyroxine (SYNTHROID, LEVOTHROID) 75 MCG tablet, TAKE ONE TABLET BY MOUTH DAILY, Disp: 90 tablet, Rfl: 1  •  metFORMIN (GLUCOPHAGE) 500 MG tablet, TAKE ONE TABLET BY MOUTH DAILY, Disp: 90 tablet, Rfl: 1  •  Tremfya 100 MG/ML solution pen-injector, Every 3 (Three) Months., Disp: , Rfl:     Past Medical History:   Diagnosis Date   • Anxiety    • Bipolar disorder (HCC)    • Depression    • Diabetes mellitus type II, controlled (HCC)    • Disease of thyroid gland    • Hypertension         Past Surgical History:   Procedure Laterality Date   • TONSILLECTOMY  2001       Objective  /88   Ht 170.2 cm (67\")   Wt 96.3 kg (212 lb 3.2 oz)   LMP  (LMP Unknown) Comment: amenorrhea on BCP's  Breastfeeding No   BMI 33.24 kg/m²     Physical Exam  Exam conducted with a chaperone present.   Constitutional:       Appearance: Normal appearance.   HENT:      Head: Normocephalic.   Neck:      Thyroid: No thyroid mass or thyromegaly.   Cardiovascular:      Rate and Rhythm: Normal rate and regular rhythm.      Heart sounds: Normal heart sounds.   Pulmonary:      Effort: Pulmonary effort is normal.      Breath sounds: Normal breath sounds.   Chest:   Breasts:      Right: No inverted nipple, mass, nipple discharge, axillary adenopathy or supraclavicular adenopathy.      Left: No inverted nipple, mass, nipple discharge, axillary adenopathy or supraclavicular adenopathy.       Abdominal:      Palpations: Abdomen is soft. There is no mass.      Tenderness: There is no abdominal tenderness.   Genitourinary:     General: Normal vulva.      Labia:         Right: No rash, tenderness or lesion.         Left: No rash, tenderness or lesion.       Vagina: Normal. No erythema.      Cervix: No discharge, lesion or erythema.      Uterus: Not enlarged and not tender.       Adnexa:   "       Right: No mass or tenderness.          Left: No mass or tenderness.        Comments: Anus appears wnl.  No rectal exam performed.  Lymphadenopathy:      Upper Body:      Right upper body: No supraclavicular or axillary adenopathy.      Left upper body: No supraclavicular or axillary adenopathy.   Neurological:      Mental Status: She is alert.         Assessment/Plan   Diagnoses and all orders for this visit:    1. Encounter for gynecological examination with abnormal finding (Primary)    2. Pap test, as part of routine gynecological examination  -     LIQUID-BASED PAP SMEAR, [facility] ([facility])    3. Hypertension, unspecified type    4. Hypothyroidism (acquired)    5. Diabetes 1.5, managed as type 2 (HCC)    6. Elevated LFTs    7. Hyperlipidemia, unspecified hyperlipidemia type    8. Vaginal discharge  -     OneSwab - Swab, Vagina; Future    9. Pelvic pain  -     US Non-ob Transvaginal; Future    10. Urinary urgency  -     Urinalysis With Culture If Indicated -; Future    11. Urinary frequency  -     Urinalysis With Culture If Indicated -; Future    Lengthy discussion re: Risks for COCPs (especially if LFTs still elevated).  Strongly encouraged progestin only methods instead.  She will call me back re: her choices.    Procedures    19 to 39: Counseling/Anticipatory Guidance Discussed: nutrition, family planning/contraception, physical activity, immunizations and breast cancer and self breast exams    Return in about 3 months (around 11/29/2022) for Recheck.    Darling Watson, APRN  08/29/2022

## 2022-08-30 LAB — REF LAB TEST METHOD: NORMAL

## 2022-08-31 ENCOUNTER — PATIENT ROUNDING (BHMG ONLY) (OUTPATIENT)
Dept: OBSTETRICS AND GYNECOLOGY | Facility: CLINIC | Age: 27
End: 2022-08-31

## 2022-08-31 NOTE — PROGRESS NOTES
A [employer] message has been sent to the patient for PATIENT ROUNDING with [facility].

## 2022-09-01 ENCOUNTER — LAB (OUTPATIENT)
Dept: LAB | Facility: HOSPITAL | Age: 27
End: 2022-09-01

## 2022-09-01 DIAGNOSIS — E11.9 TYPE 2 DIABETES MELLITUS WITHOUT COMPLICATION, WITHOUT LONG-TERM CURRENT USE OF INSULIN: ICD-10-CM

## 2022-09-01 DIAGNOSIS — I10 BENIGN ESSENTIAL HYPERTENSION: ICD-10-CM

## 2022-09-01 DIAGNOSIS — E03.9 HYPOTHYROIDISM, UNSPECIFIED TYPE: ICD-10-CM

## 2022-09-01 DIAGNOSIS — Z11.59 ENCOUNTER FOR HEPATITIS C SCREENING TEST FOR LOW RISK PATIENT: ICD-10-CM

## 2022-09-01 PROCEDURE — 84443 ASSAY THYROID STIM HORMONE: CPT

## 2022-09-01 PROCEDURE — 80061 LIPID PANEL: CPT

## 2022-09-01 PROCEDURE — 80053 COMPREHEN METABOLIC PANEL: CPT

## 2022-09-01 PROCEDURE — 82043 UR ALBUMIN QUANTITATIVE: CPT

## 2022-09-01 PROCEDURE — 86803 HEPATITIS C AB TEST: CPT

## 2022-09-01 PROCEDURE — 82570 ASSAY OF URINE CREATININE: CPT

## 2022-09-02 LAB
ALBUMIN SERPL-MCNC: 4.1 G/DL (ref 3.5–5.2)
ALBUMIN UR-MCNC: <1.2 MG/DL
ALBUMIN/GLOB SERPL: 1.3 G/DL
ALP SERPL-CCNC: 92 U/L (ref 39–117)
ALT SERPL W P-5'-P-CCNC: 102 U/L (ref 1–33)
ANION GAP SERPL CALCULATED.3IONS-SCNC: 14.1 MMOL/L (ref 5–15)
AST SERPL-CCNC: 73 U/L (ref 1–32)
BILIRUB SERPL-MCNC: 0.5 MG/DL (ref 0–1.2)
BUN SERPL-MCNC: 12 MG/DL (ref 6–20)
BUN/CREAT SERPL: 15.6 (ref 7–25)
CALCIUM SPEC-SCNC: 10.1 MG/DL (ref 8.6–10.5)
CHLORIDE SERPL-SCNC: 102 MMOL/L (ref 98–107)
CHOLEST SERPL-MCNC: 210 MG/DL (ref 0–200)
CO2 SERPL-SCNC: 22.9 MMOL/L (ref 22–29)
CREAT SERPL-MCNC: 0.77 MG/DL (ref 0.57–1)
CREAT UR-MCNC: 155.5 MG/DL
EGFRCR SERPLBLD CKD-EPI 2021: 109.9 ML/MIN/1.73
GLOBULIN UR ELPH-MCNC: 3.2 GM/DL
GLUCOSE SERPL-MCNC: 103 MG/DL (ref 65–99)
HCV AB SER DONR QL: NORMAL
HDLC SERPL-MCNC: 41 MG/DL (ref 40–60)
LDLC SERPL CALC-MCNC: 144 MG/DL (ref 0–100)
LDLC/HDLC SERPL: 3.44 {RATIO}
MICROALBUMIN/CREAT UR: NORMAL MG/G{CREAT}
POTASSIUM SERPL-SCNC: 4.7 MMOL/L (ref 3.5–5.2)
PROT SERPL-MCNC: 7.3 G/DL (ref 6–8.5)
SODIUM SERPL-SCNC: 139 MMOL/L (ref 136–145)
TRIGL SERPL-MCNC: 140 MG/DL (ref 0–150)
TSH SERPL DL<=0.05 MIU/L-ACNC: 1.16 UIU/ML (ref 0.27–4.2)
VLDLC SERPL-MCNC: 25 MG/DL (ref 5–40)

## 2022-09-06 DIAGNOSIS — F90.8 ADHD, ADULT RESIDUAL TYPE: Chronic | ICD-10-CM

## 2022-09-06 DIAGNOSIS — F51.04 INSOMNIA, PSYCHOPHYSIOLOGICAL: Chronic | ICD-10-CM

## 2022-09-06 DIAGNOSIS — F41.1 GENERALIZED ANXIETY DISORDER: Chronic | ICD-10-CM

## 2022-09-06 RX ORDER — NITROFURANTOIN 25; 75 MG/1; MG/1
100 CAPSULE ORAL 2 TIMES DAILY
Qty: 14 CAPSULE | Refills: 0 | Status: SHIPPED | OUTPATIENT
Start: 2022-09-06 | End: 2022-10-05

## 2022-09-06 RX ORDER — CLONIDINE HYDROCHLORIDE 0.1 MG/1
TABLET ORAL
Qty: 30 TABLET | Refills: 0 | Status: SHIPPED | OUTPATIENT
Start: 2022-09-06 | End: 2022-09-08 | Stop reason: SDUPTHER

## 2022-09-07 DIAGNOSIS — R79.89 ELEVATED LIVER FUNCTION TESTS: Primary | ICD-10-CM

## 2022-09-08 ENCOUNTER — TELEMEDICINE (OUTPATIENT)
Dept: PSYCHIATRY | Facility: CLINIC | Age: 27
End: 2022-09-08

## 2022-09-08 DIAGNOSIS — F33.0 MILD EPISODE OF RECURRENT MAJOR DEPRESSIVE DISORDER: Chronic | ICD-10-CM

## 2022-09-08 DIAGNOSIS — F90.8 ADHD, ADULT RESIDUAL TYPE: Primary | Chronic | ICD-10-CM

## 2022-09-08 DIAGNOSIS — F41.1 GENERALIZED ANXIETY DISORDER: Chronic | ICD-10-CM

## 2022-09-08 DIAGNOSIS — F51.04 INSOMNIA, PSYCHOPHYSIOLOGICAL: Chronic | ICD-10-CM

## 2022-09-08 PROCEDURE — 99214 OFFICE O/P EST MOD 30 MIN: CPT | Performed by: NURSE PRACTITIONER

## 2022-09-08 RX ORDER — DEXTROAMPHETAMINE SACCHARATE, AMPHETAMINE ASPARTATE MONOHYDRATE, DEXTROAMPHETAMINE SULFATE AND AMPHETAMINE SULFATE 6.25; 6.25; 6.25; 6.25 MG/1; MG/1; MG/1; MG/1
25 CAPSULE, EXTENDED RELEASE ORAL EVERY MORNING
Qty: 30 CAPSULE | Refills: 0 | Status: SHIPPED | OUTPATIENT
Start: 2022-09-08 | End: 2022-10-05 | Stop reason: SDUPTHER

## 2022-09-08 RX ORDER — LEVOTHYROXINE SODIUM 0.07 MG/1
TABLET ORAL
Qty: 90 TABLET | Refills: 1 | Status: SHIPPED | OUTPATIENT
Start: 2022-09-08 | End: 2023-02-22 | Stop reason: SDUPTHER

## 2022-09-08 RX ORDER — ESCITALOPRAM OXALATE 20 MG/1
20 TABLET ORAL EVERY MORNING
Qty: 90 TABLET | Refills: 0 | Status: SHIPPED | OUTPATIENT
Start: 2022-09-08 | End: 2023-01-24 | Stop reason: SDUPTHER

## 2022-09-08 RX ORDER — CLONIDINE HYDROCHLORIDE 0.1 MG/1
0.1 TABLET ORAL NIGHTLY
Qty: 90 TABLET | Refills: 0 | Status: SHIPPED | OUTPATIENT
Start: 2022-09-08 | End: 2023-01-16

## 2022-09-08 NOTE — PROGRESS NOTES
"This provider is located at the [facility] (through [facility]), [address], [facility], [address] using a secure [employer] Video Visit through [employer]. Patient is being seen remotely via telehealth at their home address in [place], and stated they are in a secure environment for this session. The patient's condition being diagnosed/treated is appropriate for telemedicine. The provider identified herself as well as her credentials. The patient, and/or patients guardian, consent to be seen remotely, and when consent is given they understand that the consent allows for patient identifiable information to be sent to a third party as needed. They may refuse to be seen remotely at any time. The electronic data is encrypted and password protected, and the patient and/or guardian has been advised of the potential risks to privacy not withstanding such measures.    You have chosen to receive care through a telehealth visit.  Do you consent to use a video/audio connection for your medical care today? Yes     Subjective   Isabell Oakes is a 26 y.o. female who is here today for medication management follow up.    Chief Complaint: ADHD, depression, anxiety    History of Present Illness:  The patient describes her mood as \"good\" over the last few weeks.  The patient endorses that she has been generally well over the past few weeks.  The patient states that the clonidine has been extremely helpful for sleep.  The patient states that she is able to sleep through the night without multiple nighttime awakenings as she was having prior to beginning the medication.  The patient states that she did run out of medication for a few days and endorses that sleep was much more difficult and she had several nighttime awakenings but when she resumed the medication sleep significantly improved again.  The patient states that she has tolerated the medication very well and denies any side " effects.  The patient endorses that she tolerated tapering off the Lamictal very well.  The patient denies any destabilization of mood and/or exacerbation of symptoms.  The patient endorses that she actually cannot tell a difference between taking the medication versus discontinuing it.  She endorses that her depression and anxiety have continued to be very well controlled recently.  The patient states that she has actually noticed improvements in anxiety recently and it seems to be more well-controlled.  The patient endorses that her biggest concern at this time is that ADHD symptoms are not as well managed as she would like.  The patient states that her  and friends have actually made comments to her that it does not appear that her symptoms are as well managed currently as they were when she began taking the Adderall XR initially.  The patient endorses that she thinks she would benefit from increasing her Adderall XR at this time for further management of ADHD symptoms.  The patient reports her appetite as good.  The patient denies any new medical problems or changes in medications since last appointment with this facility.  The patient reports compliance with current medication regimen.  The patient denies any current side effects from her current medication regimen.  The patient denies any abnormal muscle movements or tics.  The patient rates her ADHD at a 5-6/10 on a 0-10 scale, with 10 being the worst.  The patient rates her depression at a 0/10 on a 0-10 scale, with 10 being the worst.  The patient rates her anxiety at a 0-1/10 on a 0-10 scale, with 10 being the worst.  The patient rates hopelessness at a 0/10 on a 0-10 scale with 10 being the worst.  The patient denies suicidal ideations and homicidal ideations, and is convincing.  The patient denies any auditory hallucinations or visual hallucinations.  The patient does not endorse significant symptoms consistent with bipolar disorder or a psychotic  "illness.              LMP:  States that she doesn't have regular menstrual cycles due to her birth control.  The patient denies any chance of pregnancy at this time.  The patient was educated that her prescribed medications can have potential risk to a developing fetus. The patient is advised to contact this APRN/this office if she becomes pregnant or plans to become pregnant.  Pt verbalizes understanding and acknowledged agreement with this plan in her own words.        Prior Psychiatric Medications:  Zoloft - initially it was effective for depression and anxiety, but endorses that eventually it lost efficacy and she was switched to Lexapro.  Vraylar - caused severe exacerbation of anxiety  Latuda - reports made her \"feel weird\", caused excessive sedation and GI side effects  Wellbutrin  mg daily - ineffective for ADHD and increased anxiety   Strattera 25 mg daily - ineffective for ADHD and caused fatigue   Qelbree - caused significant nausea      The following portions of the patient's history were reviewed and updated as appropriate: allergies, current medications, past family history, past medical history, past social history, past surgical history and problem list.    Review of Systems   Constitutional: Negative for activity change, appetite change, fatigue and unexpected weight change.   Psychiatric/Behavioral: Positive for decreased concentration. Negative for agitation, behavioral problems, confusion, dysphoric mood, hallucinations, self-injury, sleep disturbance and suicidal ideas. The patient is hyperactive. The patient is not nervous/anxious.        Objective   Physical Exam  Constitutional:       Appearance: Normal appearance.   Neurological:      Mental Status: She is alert.   Psychiatric:         Attention and Perception: Perception normal. She is inattentive.         Mood and Affect: Mood and affect normal.         Speech: Speech normal.         Behavior: Behavior normal. Behavior is " cooperative.         Thought Content: Thought content normal. Thought content does not include homicidal or suicidal ideation. Thought content does not include homicidal or suicidal plan.         Cognition and Memory: Cognition and memory normal.         Judgment: Judgment normal.       not currently breastfeeding.    The patient was seen remotely today via a MyChart Video Visit through [facility].  Unable to obtain vital signs due to nature of remote visit.  Height stated at 67 inches.  Weight stated at 212 pounds.     No Known Allergies    Recent Results (from the past 2016 hour(s))   POC Glycosylated Hemoglobin (Hb A1C)    Collection Time: 22 11:50 AM    Specimen: Blood   Result Value Ref Range    Hemoglobin A1C 6.2 (A) 4.8 - 5.6 %    Lot Number [id number]     Expiration Date 03/15/2024    SHIRAZ FLU + SARS PCR    Collection Time: 22 11:52 AM    Specimen: Nasal Cavity; Swab   Result Value Ref Range    COVID19 Not Detected Not Detected - Ref. Range    Influenza A Antigen ANN Not Detected Not Detected    Influenza B Antigen ANN Not Detected Not Detected    Internal Control Passed Passed    Lot Number [id number]     Expiration Date 23    LIQUID-BASED PAP SMEAR, [facility] ([facility]OLINE,[facility])    Collection Time: 22  2:49 PM    Specimen: ThinPrep Vial   Result Value Ref Range    Reference Lab Report       [facility]  [address] [place] [address]  Phone: [phone] or [phone]  Fax: [phone]  Darrin Crane M.D., Medical Director    PATIENT NAME                                     LABORATORY NO.  ASHISH FUNES.                        [id number]  [id number]                                 AGE                    SEX   SSN              CLIENT REF #  [facility]             26        1995      F     [id number]      [id number]    [address]           REQUESTING M.D.           ATTENDING M.D.         [place] [address]                        JAMES PRATER  DATE COLLECTED            DATE RECEIVED          DATE REPORTED  08/29/2022 08/29/2022 08/30/2022    ThinPrep Pap with [facility]    DIAGNOSIS:  Negative for intraepithelial lesion or malignancy    Multiple factors can influence accuracy of Pap tests; therefore, screening at  regular  intervals is necessary for early cancer detection.    COMMENT:     Benign cellular changes associated with inflammation are present.    SPECIMEN ADEQUACY:  SATISFACTORY FOR EVALUATION  Transformation zone is present.  SOURCE OF SPECIMEN:       CERVICAL/ENDOCERVICAL  SLIDES:  1  CLINICAL HISTORY:  Pap test, as part of routine gynecological examination  Amenorrhea, Birth control pills    CYTOTECHNOLOGIST:             ROMA PADILLA (ASCP)    CPT CODES:  50633     Urinalysis With Culture If Indicated - Urine, Clean Catch    Collection Time: 08/29/22  5:39 PM    Specimen: Urine, Clean Catch   Result Value Ref Range    Color, UA Yellow Yellow, Straw    Appearance, UA Clear Clear    pH, UA 7.0 5.0 - 8.0    Specific Gravity, UA 1.021 1.005 - 1.030    Glucose, UA Negative Negative    Ketones, UA Negative Negative    Bilirubin, UA Negative Negative    Blood, UA Negative Negative    Protein, UA Negative Negative    Leuk Esterase, UA Negative Negative    Nitrite, UA Negative Negative    Urobilinogen, UA 0.2 E.U./dL 0.2 - 1.0 E.U./dL   Comprehensive Metabolic Panel    Collection Time: 09/01/22 12:18 PM    Specimen: Blood   Result Value Ref Range    Glucose 103 (H) 65 - 99 mg/dL    BUN 12 6 - 20 mg/dL    Creatinine 0.77 0.57 - 1.00 mg/dL    Sodium 139 136 - 145 mmol/L    Potassium 4.7 3.5 - 5.2 mmol/L    Chloride 102 98 - 107 mmol/L    CO2 22.9 22.0 - 29.0 mmol/L    Calcium 10.1 8.6 - 10.5 mg/dL    Total Protein 7.3 6.0 - 8.5 g/dL    Albumin 4.10 3.50 - 5.20 g/dL    ALT (SGPT) 102 (H) 1 - 33 U/L    AST (SGOT) 73 (H) 1 - 32 U/L    Alkaline Phosphatase 92 39 - 117 U/L     Total Bilirubin 0.5 0.0 - 1.2 mg/dL    Globulin 3.2 gm/dL    A/G Ratio 1.3 g/dL    BUN/Creatinine Ratio 15.6 7.0 - 25.0    Anion Gap 14.1 5.0 - 15.0 mmol/L    eGFR 109.9 >60.0 mL/min/1.73   Lipid Panel    Collection Time: 09/01/22 12:18 PM    Specimen: Blood   Result Value Ref Range    Total Cholesterol 210 (H) 0 - 200 mg/dL    Triglycerides 140 0 - 150 mg/dL    HDL Cholesterol 41 40 - 60 mg/dL    LDL Cholesterol  144 (H) 0 - 100 mg/dL    VLDL Cholesterol 25 5 - 40 mg/dL    LDL/HDL Ratio 3.44    TSH Rfx On Abnormal To Free T4    Collection Time: 09/01/22 12:18 PM    Specimen: Blood   Result Value Ref Range    TSH 1.160 0.270 - 4.200 uIU/mL   Microalbumin / Creatinine Urine Ratio - Urine, Clean Catch    Collection Time: 09/01/22 12:18 PM    Specimen: Urine, Clean Catch   Result Value Ref Range    Microalbumin/Creatinine Ratio      Creatinine, Urine 155.5 mg/dL    Microalbumin, Urine <1.2 mg/dL   Hepatitis C Antibody    Collection Time: 09/01/22 12:18 PM    Specimen: Blood   Result Value Ref Range    Hepatitis C Ab Non-Reactive Non-Reactive       Current Medications:   Current Outpatient Medications   Medication Sig Dispense Refill   • cloNIDine (CATAPRES) 0.1 MG tablet Take 1 tablet by mouth Every Night. 90 tablet 0   • escitalopram (LEXAPRO) 20 MG tablet Take 1 tablet by mouth Every Morning. 90 tablet 0   • Tessa 1.5/30 1.5-30 MG-MCG tablet TAKE ONE TABLET BY MOUTH DAILY 63 tablet 1   • hydroCHLOROthiazide (HYDRODIURIL) 25 MG tablet TAKE ONE TABLET BY MOUTH DAILY 90 tablet 1   • levothyroxine (SYNTHROID, LEVOTHROID) 75 MCG tablet TAKE ONE TABLET BY MOUTH DAILY 90 tablet 1   • metFORMIN (GLUCOPHAGE) 500 MG tablet TAKE ONE TABLET BY MOUTH DAILY 90 tablet 1   • nitrofurantoin, macrocrystal-monohydrate, (Macrobid) 100 MG capsule Take 1 capsule by mouth 2 (Two) Times a Day. 14 capsule 0   • Tremfya 100 MG/ML solution pen-injector Every 3 (Three) Months.     • amphetamine-dextroamphetamine XR (ADDERALL XR) 25 MG 24 hr  capsule Take 1 capsule by mouth Every Morning 30 capsule 0     No current facility-administered medications for this visit.       Mental Status Exam:   Hygiene:   good  Cooperation:  Cooperative  Eye Contact:  Good  Psychomotor Behavior:  Appropriate  Affect:  Full range  Hopelessness: Denies  Speech:  Normal  Thought Process:  Goal directed  Thought Content:  Normal  Suicidal:  None  Homicidal:  None  Hallucinations:  None  Delusion:  None  Memory:  Intact  Orientation:  Person, Place, Time and Situation  Reliability:  good  Insight:  Good  Judgement:  Good  Impulse Control:  Good  Physical/Medical Issues:  Yes See medical history            The [facility] report, request number [id number], of the past 12 months were reviewed and is appropriate.  The patient/guardian reports taking the medication only as prescribed.  The patient/guardian denies any abuse or misuse of the medication.  The patient/guardian denies any other substance use or issues.  There are no apparent substance related issues.  The patient reports no side effects of the current medication usage.  The patient/guardian has reported significant improvement with medication usage and wishes to continue medication as prescribed.  The patient/guardian is appropriate to continue with current medication usage at this time.  Reinforced risks and side effects of medication usage, patient and/or guardian verbalize understanding in their own words and are in agreement with current plan.        Assessment & Plan   Diagnoses and all orders for this visit:    1. ADHD, adult residual type (Primary)  -     cloNIDine (CATAPRES) 0.1 MG tablet; Take 1 tablet by mouth Every Night.  Dispense: 90 tablet; Refill: 0  -     amphetamine-dextroamphetamine XR (ADDERALL XR) 25 MG 24 hr capsule; Take 1 capsule by mouth Every Morning  Dispense: 30 capsule; Refill: 0    2. Mild episode of recurrent major depressive disorder (HCC)  -     escitalopram (LEXAPRO) 20 MG tablet; Take 1 tablet  by mouth Every Morning.  Dispense: 90 tablet; Refill: 0    3. Generalized anxiety disorder  -     escitalopram (LEXAPRO) 20 MG tablet; Take 1 tablet by mouth Every Morning.  Dispense: 90 tablet; Refill: 0  -     cloNIDine (CATAPRES) 0.1 MG tablet; Take 1 tablet by mouth Every Night.  Dispense: 90 tablet; Refill: 0    4. Insomnia, psychophysiological  -     cloNIDine (CATAPRES) 0.1 MG tablet; Take 1 tablet by mouth Every Night.  Dispense: 90 tablet; Refill: 0            Visit Diagnoses:    ICD-10-CM ICD-9-CM   1. ADHD, adult residual type  F90.8 314.01   2. Mild episode of recurrent major depressive disorder (HCC)  F33.0 296.31   3. Generalized anxiety disorder  F41.1 300.02   4. Insomnia, psychophysiological  F51.04 307.42       GOALS:  Short Term Goals: Patient will be compliant with medication, and patient will have no significant medication related side effects.  Patient will be engaged in psychotherapy as indicated.  Patient will report subjective improvement of symptoms.  Long term goals: To stabilize mood and treat/improve subjective symptoms, the patient will stay out of the hospital, the patient will be at an optimal level of functioning, and the patient will take all medications as prescribed.  The patient verbalized understanding and agreement with goals that were mutually set.      SUICIDE RISK ASSESSMENT: Unalterable demographics and a history of mental health intervention indicate this patient is in a high risk category compared to the general population. At present, the patient denies active SI/HI, intentions, or plans at this time and agrees to seek immediate care should such thoughts develop. The patient verbalizes understanding of how to access emergency care if needed and agrees to do so. Consideration of suicide risk and protective factors such as history, current presentation, individual strengths and weaknesses, psychosocial and environmental stressors and variables, psychiatric illness and  symptoms, medical conditions and pain, took place in this interview. Based on those considerations, the patient is determined: within individual baseline and presenting no imminent risk for suicide or homicide. Other recommendations: The patient does not meet the criteria for inpatient admission and is not a safety risk to self or others at today's visit. Inpatient treatment offers no significant advantages over outpatient treatment for this patient at today's visit.      SAFETY PLAN:  Patient was given ample time for questions and fully participated in treatment planning.  Patient was encouraged to call the clinic with any questions or concerns.  Patient was informed of access to emergency care. If patient were to develop any significant symptomatology, suicidal ideation, homicidal ideation, any concerns, or feel unsafe at any time they are to call the clinic and if unable to get immediate assistance should immediately call 911 or go to the nearest emergency room.  The patient is advised to remove or secure (lock away) all lethal weapons (including guns) and sharps (including razors, scissors, knives, etc.).  All medications (including any prescribed and any over the counter medications) should be stored in a safe and secured location that is not obtainable by children/adolescents.  Patient was given an opportunity and encouraged to ask questions about their medication, illness, and treatment. Patient contracted verbally for the following: If you are experiencing an emotional crisis or have thoughts of harming yourself or others, please go to your nearest local emergency room or call 911. Will continue to re-assess medication response and side effects frequently to establish efficacy and ensure safety. Risks, any black box warnings, side effects, off label usage, and benefits of medication and treatment discussed with patient, along with potential adverse side effects of current and/or newly prescribed medication,  alternative treatment options, and OTC medications.  Patient verbalized understanding of potential risks, any off label use of medication, any black box warnings, and any side effects in their own words. The patient verbalized understanding and agreed to comply with the safety plan discussed in their own words.  Patient given the number to the office. Number also available to the 24- hour suicide hotline.       TREATMENT PLAN/GOALS: Continue medications and treatment plan as indicated. Treatment and medication options discussed during today's visit. Patient acknowledged and verbally consented to continue with current treatment plan and was educated on the importance of compliance with treatment and follow-up appointments.        -Increase Adderall XR to 25 mg daily for ADHD  -Continue Lexapro 20 mg daily for depression/anxiety  -Continue Clonidine 0.1 mg nightly for insomnia and as adjunct for ADHD/anxiety         MEDICATION ISSUES: Discussed medication options and treatment plan of prescribed medication, any off label use of medication, as well as the risks, benefits, any black box warnings including increased suicidality, and side effects including but not limited to potential falls, dizziness, possible impaired driving, GI side effects (change in appetite, abdominal discomfort, nausea, vomiting, diarrhea, and/or constipation), dry mouth, somnolence, sedation, insomnia, activation, agitation, irritation, tremors, abnormal muscle movements or disorders, headache, sweating, possible bruising or rare bleeding, electrolyte and/or fluid abnormalities, change in blood pressure/heart rate/and or heart rhythm, sexual dysfunction, and metabolic adversities among others. Patient and/or guardian agreeable to call the office with any worsening of symptoms or onset of side effects, or if any concerns or questions arise.  The contact information for the office is made available to the patient and/or guardian.  Patient and/or  guardian agreeable to call 911 or go to the nearest ER should they begin having any SI/HI, or if any urgent concerns arise. No medication side effects or related complaints today.    The patient is being prescribed a controlled substance as part of the treatment plan. The patient/guardian has been educated of appropriate use of the medication(s), including risks and side effects such as insomnia, headache, exacerbation of tics, nervousness, irritability, overstimulation, tremor, dizziness, anorexia or change in appetite, nausea, dry mouth, constipation, diarrhea, weight loss, sexual dysfunction, psychotic episodes, seizures, palpitations, tachycardia, hypertension, rare activation (activation of hypomania, rozina, and/or suicidal ideations), cardiovascular adverse effects including sudden death (especially patients with pre-existing structural abnormalities often associated with a family history of cardiac disease), may slow normal growth in children, weight gain is reported but not expected, sedation is possible but activation is much more common, metabolic adversities, and overdose among others. Patient/guardian is also informed that the medication is to be used by the patient only, the medication is to be used only as directed, and the medication should not be combined with other substances unless directed by a Provider/Prescriber. The patient/guardian verbalized understanding and agreement with this in their own words, and wish to continue with current treatment plan.    Without the prescribed medication for ADHD, it is reported that the patient has problems with attention and focus including being easily distracted, easily losing objects, trouble with time management, trouble completing tasks because of distractions, procrastination, indecisiveness, careless mistakes in daily activities/work/school, and not finishing jobs that are started.  Patient denies any side effects, no worsening of insomnia, and no  worsening of anxiety on the medication dose.  Due to the reported problems without the medication usage, as well as the significant improvement in symptoms with the medication usage, the patient requests to remain on the prescribed medication for ADHD.                          [facility] No Show Policy:  We understand unexpected circumstances arise; however, anytime you miss your appointment we are unable to provide you appropriate care.  In addition, each appointment missed could have been used to provide care for others.  We ask that you call at least 24 hours in advance to cancel or reschedule an appointment.  We would like to take this opportunity to remind you of our policy stating patients who miss THREE or more appointments without cancelling or rescheduling 24 hours in advance of the appointment may be subject to cancellation of any further visits with our clinic and recommendation to seek in-person services/visits.    Please call [phone] to reschedule your appointment. If there are reasons that make it difficult for you to keep the appointments, please call and let us know how we can help.  Please understand that medication prescribing will not continue without seeing your provider.      [facility]'s No Show Policy reviewed with patient at today's visit. Patient verbalized understanding of this policy. Discussed with patient that in the event that there are three or more no show visits, it will be recommended that they pursue in-person services/visits as noncompliance with telehealth visits indicates that patient is not an appropriate candidate for telemedicine and would likely be more appropriate for in-person services/visits. Patient verbalizes understanding and is agreeable to this.           MEDS ORDERED DURING VISIT:  New Medications Ordered This Visit   Medications   • escitalopram (LEXAPRO) 20 MG tablet     Sig: Take 1 tablet by mouth Every  Morning.     Dispense:  90 tablet     Refill:  0   • cloNIDine (CATAPRES) 0.1 MG tablet     Sig: Take 1 tablet by mouth Every Night.     Dispense:  90 tablet     Refill:  0   • amphetamine-dextroamphetamine XR (ADDERALL XR) 25 MG 24 hr capsule     Sig: Take 1 capsule by mouth Every Morning     Dispense:  30 capsule     Refill:  0       Return in about 4 weeks (around 10/6/2022), or if symptoms worsen or fail to improve, for Recheck.        Patient will follow-up in 4 weeks, highly encouraged the patient if she had any questions or concerns to contact the behavioral health [facility] at [facility] for sooner appointment patient verbalized understanding.      Functional Status: Moderate impairment     Prognosis: Fair with Ongoing Treatment             This document has been electronically signed by VIVIANE Dash  September 8, 2022 10:54 EDT      Some of the data in this electronic note has been brought forward from a previous encounter, any necessary changes have been made, it has been reviewed by this APRN, and it is accurate.       Part of this note may be an electronic transcription/translation of spoken language to printed text using the Dragon Dictation System.

## 2022-09-22 ENCOUNTER — HOSPITAL ENCOUNTER (OUTPATIENT)
Dept: ULTRASOUND IMAGING | Facility: HOSPITAL | Age: 27
Discharge: HOME | End: 2022-09-22
Admitting: NURSE PRACTITIONER

## 2022-09-22 DIAGNOSIS — R79.89 ELEVATED LIVER FUNCTION TESTS: ICD-10-CM

## 2022-09-22 PROCEDURE — 76705 ECHO EXAM OF ABDOMEN: CPT

## 2022-09-25 PROBLEM — K76.0 HEPATIC STEATOSIS: Status: ACTIVE | Noted: 2022-09-25

## 2022-10-05 ENCOUNTER — TELEMEDICINE (OUTPATIENT)
Dept: PSYCHIATRY | Facility: CLINIC | Age: 27
End: 2022-10-05

## 2022-10-05 DIAGNOSIS — F90.8 ADHD, ADULT RESIDUAL TYPE: Primary | Chronic | ICD-10-CM

## 2022-10-05 DIAGNOSIS — F41.1 GENERALIZED ANXIETY DISORDER: Chronic | ICD-10-CM

## 2022-10-05 DIAGNOSIS — F33.0 MILD EPISODE OF RECURRENT MAJOR DEPRESSIVE DISORDER: Chronic | ICD-10-CM

## 2022-10-05 DIAGNOSIS — F51.04 INSOMNIA, PSYCHOPHYSIOLOGICAL: Chronic | ICD-10-CM

## 2022-10-05 DIAGNOSIS — Z79.899 LONG-TERM USE OF HIGH-RISK MEDICATION: ICD-10-CM

## 2022-10-05 PROCEDURE — 99214 OFFICE O/P EST MOD 30 MIN: CPT | Performed by: NURSE PRACTITIONER

## 2022-10-05 RX ORDER — DEXTROAMPHETAMINE SACCHARATE, AMPHETAMINE ASPARTATE MONOHYDRATE, DEXTROAMPHETAMINE SULFATE AND AMPHETAMINE SULFATE 6.25; 6.25; 6.25; 6.25 MG/1; MG/1; MG/1; MG/1
25 CAPSULE, EXTENDED RELEASE ORAL EVERY MORNING
Qty: 30 CAPSULE | Refills: 0 | Status: SHIPPED | OUTPATIENT
Start: 2022-10-05 | End: 2022-11-14 | Stop reason: SDUPTHER

## 2022-10-05 NOTE — PROGRESS NOTES
"This provider is located at the [facility] (through [facility]), [address] [facility] [address], [place], [address] using a secure [employer] Video Visit through [employer]. Patient is being seen remotely via telehealth at their home address in [place], and stated they are in a secure environment for this session. The patient's condition being diagnosed/treated is appropriate for telemedicine. The provider identified herself as well as her credentials. The patient, and/or patients guardian, consent to be seen remotely, and when consent is given they understand that the consent allows for patient identifiable information to be sent to a third party as needed. They may refuse to be seen remotely at any time. The electronic data is encrypted and password protected, and the patient and/or guardian has been advised of the potential risks to privacy not withstanding such measures.    You have chosen to receive care through a telehealth visit.  Do you consent to use a video/audio connection for your medical care today? Yes     Patient confirmed consent for today's encounter on 10/5/22.          Subjective   Isabell Oakes is a 26 y.o. female who is here today for medication management follow up.    Chief Complaint: ADHD, depression, anxiety, insomnia     History of Present Illness:  The patient describes her mood as \"good\" over the last few weeks.  The patient endorses that she has been doing really well over the past few weeks.  The patient endorses that her ADHD symptoms have been more well-controlled since beginning the increased dose of Adderall XR.  The patient endorses that her  states he has not noticed a significant change since she began the increased dose of Adderall XR, but states that she thinks this is because she needs to work on her coping mechanisms and habit she has developed as a result of untreated ADHD.  The patient endorses that she would be interested in beginning " "therapy at this time and requests a referral.  The patient reports her appetite as good.  The patient reports her sleep as good.  The patient denies any nightmares or bad dreams.  The patient denies any new medical problems or changes in medications since last appointment with this facility.  The patient reports compliance with current medication regimen.  The patient denies any current side effects from her current medication regimen.  The patient denies any abnormal muscle movements or tics.  The patient rates her ADHD at a 3-4/10 on a 0-10 scale, with 10 being the worst.  The patient rates her depression at a 0-1/10 on a 0-10 scale, with 10 being the worst.  The patient rates her anxiety at a 1/10 on a 0-10 scale, with 10 being the worst.  The patient rates hopelessness at a 0/10 on a 0-10 scale with 10 being the worst.  The patient would like to not adjust or change her medications at this visit.  The patient denies suicidal ideations and homicidal ideations, and is convincing.  The patient denies any auditory hallucinations or visual hallucinations.  The patient does not endorse significant symptoms consistent with bipolar disorder or a psychotic illness.                LMP:  States that she doesn't have regular menstrual cycles due to her birth control.  The patient denies any chance of pregnancy at this time.  The patient was educated that her prescribed medications can have potential risk to a developing fetus. The patient is advised to contact this APRN/this office if she becomes pregnant or plans to become pregnant.  Pt verbalizes understanding and acknowledged agreement with this plan in her own words.        Prior Psychiatric Medications:  Zoloft - initially it was effective for depression and anxiety, but endorses that eventually it lost efficacy and she was switched to Lexapro.  Vraylar - caused severe exacerbation of anxiety  Latuda - reports made her \"feel weird\", caused excessive sedation and GI " side effects  Wellbutrin  mg daily - ineffective for ADHD and increased anxiety   Strattera 25 mg daily - ineffective for ADHD and caused fatigue   Qelbree - caused significant nausea      The following portions of the patient's history were reviewed and updated as appropriate: allergies, current medications, past family history, past medical history, past social history, past surgical history and problem list.    Review of Systems   Constitutional: Negative for activity change, appetite change, fatigue and unexpected weight change.   Psychiatric/Behavioral: Positive for decreased concentration. Negative for agitation, behavioral problems, confusion, dysphoric mood, hallucinations, self-injury, sleep disturbance and suicidal ideas. The patient is not nervous/anxious and is not hyperactive.        Objective   Physical Exam  Constitutional:       Appearance: Normal appearance.   Neurological:      Mental Status: She is alert.   Psychiatric:         Attention and Perception: Attention and perception normal.         Mood and Affect: Mood and affect normal.         Speech: Speech normal.         Behavior: Behavior normal. Behavior is cooperative.         Thought Content: Thought content normal. Thought content does not include homicidal or suicidal ideation. Thought content does not include homicidal or suicidal plan.         Cognition and Memory: Cognition and memory normal.         Judgment: Judgment normal.       not currently breastfeeding.    The patient was seen remotely today via a MyChart Video Visit through [facility].  Unable to obtain vital signs due to nature of remote visit.  Height stated at 67 inches.  Weight stated at 212 pounds.     No Known Allergies    Recent Results (from the past 2016 hour(s))   POC Glycosylated Hemoglobin (Hb A1C)    Collection Time: 07/25/22 11:50 AM    Specimen: Blood   Result Value Ref Range    Hemoglobin A1C 6.2 (A) 4.8 - 5.6 %    Lot Number [id number]     Expiration Date  03/15/2024    SHIRAZ FLU + SARS PCR    Collection Time: 22 11:52 AM    Specimen: Nasal Cavity; Swab   Result Value Ref Range    COVID19 Not Detected Not Detected - Ref. Range    Influenza A Antigen ANN Not Detected Not Detected    Influenza B Antigen ANN Not Detected Not Detected    Internal Control Passed Passed    Lot Number [id number]     Expiration Date 23    LIQUID-BASED PAP SMEAR, [facility] ([facility])    Collection Time: 22  2:49 PM    Specimen: ThinPrep Vial   Result Value Ref Range    Reference Lab Report       [facility]  [address] [place] [address]  Phone: [phone] or [phone]  Fax: [phone]  Darrin Crane M.D., Medical Director    PATIENT NAME                                     LABORATORY NO.  ASHISH FUNES.                        [id number]  [id number]                                 AGE                    SEX   SSN              CLIENT REF #  [facility]             26        1995      F     [id number]      [id number]    [address]           REQUESTING M.SONALI.           ATTENDING M.D.         COPY TO.  [address]  DATE COLLECTED            DATE RECEIVED          DATE REPORTED  2022    ThinPrep Pap with [facility]    DIAGNOSIS:  Negative for intraepithelial lesion or malignancy    Multiple factors can influence accuracy of Pap tests; therefore, screening at  regular  intervals is necessary for early cancer detection.    COMMENT:     Benign cellular changes associated with inflammation are present.    SPECIMEN ADEQUACY:  SATISFACTORY FOR EVALUATION  Transformation zone is present.  SOURCE OF SPECIMEN:       CERVICAL/ENDOCERVICAL  SLIDES:  1  CLINICAL HISTORY:  Pap test, as part of routine gynecological examination  Amenorrhea, Birth control pills    CYTOTECHNOLOGIST:              ROMA PADILLA ([facility])    CPT CODES:  32089     Urinalysis With Culture If Indicated - Urine, Clean Catch    Collection Time: 08/29/22  5:39 PM    Specimen: Urine, Clean Catch   Result Value Ref Range    Color, UA Yellow Yellow, Straw    Appearance, UA Clear Clear    pH, UA 7.0 5.0 - 8.0    Specific Gravity, UA 1.021 1.005 - 1.030    Glucose, UA Negative Negative    Ketones, UA Negative Negative    Bilirubin, UA Negative Negative    Blood, UA Negative Negative    Protein, UA Negative Negative    Leuk Esterase, UA Negative Negative    Nitrite, UA Negative Negative    Urobilinogen, UA 0.2 E.U./dL 0.2 - 1.0 E.U./dL   Comprehensive Metabolic Panel    Collection Time: 09/01/22 12:18 PM    Specimen: Blood   Result Value Ref Range    Glucose 103 (H) 65 - 99 mg/dL    BUN 12 6 - 20 mg/dL    Creatinine 0.77 0.57 - 1.00 mg/dL    Sodium 139 136 - 145 mmol/L    Potassium 4.7 3.5 - 5.2 mmol/L    Chloride 102 98 - 107 mmol/L    CO2 22.9 22.0 - 29.0 mmol/L    Calcium 10.1 8.6 - 10.5 mg/dL    Total Protein 7.3 6.0 - 8.5 g/dL    Albumin 4.10 3.50 - 5.20 g/dL    ALT (SGPT) 102 (H) 1 - 33 U/L    AST (SGOT) 73 (H) 1 - 32 U/L    Alkaline Phosphatase 92 39 - 117 U/L    Total Bilirubin 0.5 0.0 - 1.2 mg/dL    Globulin 3.2 gm/dL    A/G Ratio 1.3 g/dL    BUN/Creatinine Ratio 15.6 7.0 - 25.0    Anion Gap 14.1 5.0 - 15.0 mmol/L    eGFR 109.9 >60.0 mL/min/1.73   Lipid Panel    Collection Time: 09/01/22 12:18 PM    Specimen: Blood   Result Value Ref Range    Total Cholesterol 210 (H) 0 - 200 mg/dL    Triglycerides 140 0 - 150 mg/dL    HDL Cholesterol 41 40 - 60 mg/dL    LDL Cholesterol  144 (H) 0 - 100 mg/dL    VLDL Cholesterol 25 5 - 40 mg/dL    LDL/HDL Ratio 3.44    TSH Rfx On Abnormal To Free T4    Collection Time: 09/01/22 12:18 PM    Specimen: Blood   Result Value Ref Range    TSH 1.160 0.270 - 4.200 uIU/mL   Microalbumin / Creatinine Urine Ratio - Urine, Clean Catch    Collection Time: 09/01/22 12:18 PM    Specimen: Urine, Clean Catch   Result  Value Ref Range    Microalbumin/Creatinine Ratio      Creatinine, Urine 155.5 mg/dL    Microalbumin, Urine <1.2 mg/dL   Hepatitis C Antibody    Collection Time: 09/01/22 12:18 PM    Specimen: Blood   Result Value Ref Range    Hepatitis C Ab Non-Reactive Non-Reactive       Current Medications:   Current Outpatient Medications   Medication Sig Dispense Refill   • amphetamine-dextroamphetamine XR (ADDERALL XR) 25 MG 24 hr capsule Take 1 capsule by mouth Every Morning 30 capsule 0   • cloNIDine (CATAPRES) 0.1 MG tablet Take 1 tablet by mouth Every Night. 90 tablet 0   • escitalopram (LEXAPRO) 20 MG tablet Take 1 tablet by mouth Every Morning. 90 tablet 0   • Tessa 1.5/30 1.5-30 MG-MCG tablet TAKE ONE TABLET BY MOUTH DAILY 63 tablet 1   • hydroCHLOROthiazide (HYDRODIURIL) 25 MG tablet TAKE ONE TABLET BY MOUTH DAILY 90 tablet 1   • levothyroxine (SYNTHROID, LEVOTHROID) 75 MCG tablet TAKE ONE TABLET BY MOUTH DAILY 90 tablet 1   • metFORMIN (GLUCOPHAGE) 500 MG tablet TAKE ONE TABLET BY MOUTH DAILY 90 tablet 1   • Tremfya 100 MG/ML solution pen-injector Every 3 (Three) Months.       No current facility-administered medications for this visit.       Mental Status Exam:   Hygiene:   good  Cooperation:  Cooperative  Eye Contact:  Good  Psychomotor Behavior:  Appropriate  Affect:  Full range  Hopelessness: Denies  Speech:  Normal  Thought Process:  Goal directed  Thought Content:  Normal  Suicidal:  None  Homicidal:  None  Hallucinations:  None  Delusion:  None  Memory:  Intact  Orientation:  Person, Place, Time and Situation  Reliability:  good  Insight:  Good  Judgement:  Good  Impulse Control:  Good  Physical/Medical Issues:  Yes See medical history          Assessment & Plan   Diagnoses and all orders for this visit:    1. ADHD, adult residual type (Primary)  -     amphetamine-dextroamphetamine XR (ADDERALL XR) 25 MG 24 hr capsule; Take 1 capsule by mouth Every Morning  Dispense: 30 capsule; Refill: 0  -     Ambulatory  Referral to Psychiatry    2. Mild episode of recurrent major depressive disorder (HCC)  -     Ambulatory Referral to Psychiatry    3. Generalized anxiety disorder  -     Ambulatory Referral to Psychiatry    4. Insomnia, psychophysiological    5. Long-term use of high-risk medication  -     ECG 12 Lead; Future            Visit Diagnoses:    ICD-10-CM ICD-9-CM   1. ADHD, adult residual type  F90.8 314.01   2. Mild episode of recurrent major depressive disorder (HCC)  F33.0 296.31   3. Generalized anxiety disorder  F41.1 300.02   4. Insomnia, psychophysiological  F51.04 307.42   5. Long-term use of high-risk medication  Z79.899 V58.69       GOALS:  Short Term Goals: Patient will be compliant with medication, and patient will have no significant medication related side effects.  Patient will be engaged in psychotherapy as indicated.  Patient will report subjective improvement of symptoms.  Long term goals: To stabilize mood and treat/improve subjective symptoms, the patient will stay out of the hospital, the patient will be at an optimal level of functioning, and the patient will take all medications as prescribed.  The patient verbalized understanding and agreement with goals that were mutually set.      SUICIDE RISK ASSESSMENT: Unalterable demographics and a history of mental health intervention indicate this patient is in a high risk category compared to the general population. At present, the patient denies active SI/HI, intentions, or plans at this time and agrees to seek immediate care should such thoughts develop. The patient verbalizes understanding of how to access emergency care if needed and agrees to do so. Consideration of suicide risk and protective factors such as history, current presentation, individual strengths and weaknesses, psychosocial and environmental stressors and variables, psychiatric illness and symptoms, medical conditions and pain, took place in this interview. Based on those  considerations, the patient is determined: within individual baseline and presenting no imminent risk for suicide or homicide. Other recommendations: The patient does not meet the criteria for inpatient admission and is not a safety risk to self or others at today's visit. Inpatient treatment offers no significant advantages over outpatient treatment for this patient at today's visit.      SAFETY PLAN:  Patient was given ample time for questions and fully participated in treatment planning.  Patient was encouraged to call the clinic with any questions or concerns.  Patient was informed of access to emergency care. If patient were to develop any significant symptomatology, suicidal ideation, homicidal ideation, any concerns, or feel unsafe at any time they are to call the clinic and if unable to get immediate assistance should immediately call 911 or go to the nearest emergency room.  The patient is advised to remove or secure (lock away) all lethal weapons (including guns) and sharps (including razors, scissors, knives, etc.).  All medications (including any prescribed and any over the counter medications) should be stored in a safe and secured location that is not obtainable by children/adolescents.  Patient was given an opportunity and encouraged to ask questions about their medication, illness, and treatment. Patient contracted verbally for the following: If you are experiencing an emotional crisis or have thoughts of harming yourself or others, please go to your nearest local emergency room or call 911. Will continue to re-assess medication response and side effects frequently to establish efficacy and ensure safety. Risks, any black box warnings, side effects, off label usage, and benefits of medication and treatment discussed with patient, along with potential adverse side effects of current and/or newly prescribed medication, alternative treatment options, and OTC medications.  Patient verbalized  understanding of potential risks, any off label use of medication, any black box warnings, and any side effects in their own words. The patient verbalized understanding and agreed to comply with the safety plan discussed in their own words.  Patient given the number to the office. Number also available to the 24- hour suicide hotline.       TREATMENT PLAN/GOALS: Continue medications and treatment plan as indicated. Treatment and medication options discussed during today's visit. Patient acknowledged and verbally consented to continue with current treatment plan and was educated on the importance of compliance with treatment and follow-up appointments.        -Continue Adderall XR 25 mg daily for ADHD  -Continue Lexapro 20 mg daily for depression/anxiety  -Continue Clonidine 0.1 mg nightly for insomnia and as adjunct for ADHD/anxiety   -Begin psychotherapy per patient's request.  Referral placed to begin therapy with the [facility] at today's encounter.   -12-lead ECG ordered at today's encounter, to be completed within the next 3 months, due to patient's continuation of long-term stimulant therapy for treatment of ADHD and to confirm safety/candidacy of patient to continue safely with current treatment regimen.  Discussed with the patient that no further refills of stimulant can be prescribed if ECG is not completed within the next 3 months.  Patient verbalized understanding and agreement of ECG testing to be obtained and completed within the next 3 months, and reports they plan to have ECG testing completed outpatient at [facility].        MEDICATION ISSUES: Discussed medication options and treatment plan of prescribed medication, any off label use of medication, as well as the risks, benefits, any black box warnings including increased suicidality, and side effects including but not limited to potential falls, dizziness, possible impaired driving, GI side effects (change in  appetite, abdominal discomfort, nausea, vomiting, diarrhea, and/or constipation), dry mouth, somnolence, sedation, insomnia, activation, agitation, irritation, tremors, abnormal muscle movements or disorders, headache, sweating, possible bruising or rare bleeding, electrolyte and/or fluid abnormalities, change in blood pressure/heart rate/and or heart rhythm, sexual dysfunction, and metabolic adversities among others. Patient and/or guardian agreeable to call the office with any worsening of symptoms or onset of side effects, or if any concerns or questions arise.  The contact information for the office is made available to the patient and/or guardian.  Patient and/or guardian agreeable to call 911 or go to the nearest ER should they begin having any SI/HI, or if any urgent concerns arise. No medication side effects or related complaints today.    The patient is being prescribed a controlled substance as part of the treatment plan. The patient/guardian has been educated of appropriate use of the medication(s), including risks and side effects such as insomnia, headache, exacerbation of tics, nervousness, irritability, overstimulation, tremor, dizziness, anorexia or change in appetite, nausea, dry mouth, constipation, diarrhea, weight loss, sexual dysfunction, psychotic episodes, seizures, palpitations, tachycardia, hypertension, rare activation (activation of hypomania, rozina, and/or suicidal ideations), cardiovascular adverse effects including sudden death (especially patients with pre-existing structural abnormalities often associated with a family history of cardiac disease), may slow normal growth in children, weight gain is reported but not expected, sedation is possible but activation is much more common, metabolic adversities, and overdose among others. Patient/guardian is also informed that the medication is to be used by the patient only, the medication is to be used only as directed, and the medication  should not be combined with other substances unless directed by a Provider/Prescriber. The patient/guardian verbalized understanding and agreement with this in their own words, and wish to continue with current treatment plan.    Without the prescribed medication for ADHD, it is reported that the patient has problems with attention and focus including being easily distracted, easily losing objects, trouble with time management, trouble completing tasks because of distractions, procrastination, indecisiveness, careless mistakes in daily activities/work/school, and not finishing jobs that are started.  Patient denies any side effects, no worsening of insomnia, and no worsening of anxiety on the medication dose.  Due to the reported problems without the medication usage, as well as the significant improvement in symptoms with the medication usage, the patient requests to remain on the prescribed medication for ADHD.                          [facility] No Show Policy:  We understand unexpected circumstances arise; however, anytime you miss your appointment we are unable to provide you appropriate care.  In addition, each appointment missed could have been used to provide care for others.  We ask that you call at least 24 hours in advance to cancel or reschedule an appointment.  We would like to take this opportunity to remind you of our policy stating patients who miss THREE or more appointments without cancelling or rescheduling 24 hours in advance of the appointment may be subject to cancellation of any further visits with our clinic and recommendation to seek in-person services/visits.    Please call [phone] to reschedule your appointment. If there are reasons that make it difficult for you to keep the appointments, please call and let us know how we can help.  Please understand that medication prescribing will not continue without seeing your provider.      [facility]  Clinic's No Show Policy reviewed with patient at today's visit. Patient verbalized understanding of this policy. Discussed with patient that in the event that there are three or more no show visits, it will be recommended that they pursue in-person services/visits as noncompliance with telehealth visits indicates that patient is not an appropriate candidate for telemedicine and would likely be more appropriate for in-person services/visits. Patient verbalizes understanding and is agreeable to this.           MEDS ORDERED DURING VISIT:  New Medications Ordered This Visit   Medications   • amphetamine-dextroamphetamine XR (ADDERALL XR) 25 MG 24 hr capsule     Sig: Take 1 capsule by mouth Every Morning     Dispense:  30 capsule     Refill:  0       Return in about 6 weeks (around 11/16/2022), or if symptoms worsen or fail to improve, for Recheck.        Patient will follow-up in 6 weeks, highly encouraged the patient if she had any questions or concerns to contact the behavioral health virtual clinic for sooner appointment patient verbalized understanding.      Functional Status: Mild impairment     Prognosis: Fair with Ongoing Treatment              This document has been electronically signed by VIVIANE Dash  October 5, 2022 08:53 EDT      Some of the data in this electronic note has been brought forward from a previous encounter, any necessary changes have been made, it has been reviewed by this APRN, and it is accurate.       Part of this note may be an electronic transcription/translation of spoken language to printed text using the Dragon Dictation System.

## 2022-11-14 DIAGNOSIS — F90.8 ADHD, ADULT RESIDUAL TYPE: Chronic | ICD-10-CM

## 2022-11-14 RX ORDER — DEXTROAMPHETAMINE SACCHARATE, AMPHETAMINE ASPARTATE MONOHYDRATE, DEXTROAMPHETAMINE SULFATE AND AMPHETAMINE SULFATE 6.25; 6.25; 6.25; 6.25 MG/1; MG/1; MG/1; MG/1
25 CAPSULE, EXTENDED RELEASE ORAL EVERY MORNING
Qty: 30 CAPSULE | Refills: 0 | Status: SHIPPED | OUTPATIENT
Start: 2022-11-14 | End: 2023-01-03 | Stop reason: SDUPTHER

## 2022-11-16 ENCOUNTER — OFFICE VISIT (OUTPATIENT)
Dept: INTERNAL MEDICINE | Facility: CLINIC | Age: 27
End: 2022-11-16

## 2022-11-16 VITALS
HEART RATE: 84 BPM | DIASTOLIC BLOOD PRESSURE: 66 MMHG | WEIGHT: 211 LBS | SYSTOLIC BLOOD PRESSURE: 134 MMHG | BODY MASS INDEX: 31.25 KG/M2 | TEMPERATURE: 97.7 F | HEIGHT: 69 IN

## 2022-11-16 DIAGNOSIS — E55.9 VITAMIN D DEFICIENCY: ICD-10-CM

## 2022-11-16 DIAGNOSIS — I10 BENIGN ESSENTIAL HYPERTENSION: ICD-10-CM

## 2022-11-16 DIAGNOSIS — Z79.899 LONG-TERM CURRENT USE OF STIMULANT: ICD-10-CM

## 2022-11-16 DIAGNOSIS — E11.9 TYPE 2 DIABETES MELLITUS WITHOUT COMPLICATION, WITHOUT LONG-TERM CURRENT USE OF INSULIN: Primary | ICD-10-CM

## 2022-11-16 LAB
EXPIRATION DATE: NORMAL
HBA1C MFR BLD: 6.2 %
Lab: NORMAL

## 2022-11-16 PROCEDURE — 83036 HEMOGLOBIN GLYCOSYLATED A1C: CPT | Performed by: NURSE PRACTITIONER

## 2022-11-16 PROCEDURE — 99214 OFFICE O/P EST MOD 30 MIN: CPT | Performed by: NURSE PRACTITIONER

## 2022-11-16 PROCEDURE — 93000 ELECTROCARDIOGRAM COMPLETE: CPT | Performed by: NURSE PRACTITIONER

## 2022-11-16 RX ORDER — NORETHINDRONE ACETATE AND ETHINYL ESTRADIOL .03; 1.5 MG/1; MG/1
1 TABLET ORAL DAILY
Qty: 63 TABLET | Refills: 1 | Status: SHIPPED | OUTPATIENT
Start: 2022-11-16

## 2022-11-16 RX ORDER — HYDROCHLOROTHIAZIDE 25 MG/1
25 TABLET ORAL DAILY
Qty: 90 TABLET | Refills: 1 | Status: SHIPPED | OUTPATIENT
Start: 2022-11-16 | End: 2023-02-22 | Stop reason: SDUPTHER

## 2022-11-16 NOTE — PROGRESS NOTES
Isabell Oakes  1995  [id number]  Patient Care Team:  Yamilex Khan APRN as PCP - General (Internal Medicine)  Walter, VIVIANE Santamaria as Nurse Practitioner (Obstetrics and Gynecology)  Sherri Zuñiga APRN as Nurse Practitioner (Nurse Practitioner)  Lisa Treviño APRN as Nurse Practitioner (Family Medicine)    Isabell Oakes is a 27 y.o. pleasant female here today for annual physical.  Chief Complaint   Patient presents with   • Annual Exam       HPI:   Juanjo has a pertinent medical history including diabetes, hypothyroidism, hypertension, hyperlipidemia, morbid obesity, and psoriasis. She is working full time at [employer] after resigning from full time job as a .  She sees mental health provider at [facility]. She also sees a dietician, Idalia Katz. She is here for a routine physical.    The patient states that her psych NP Breanne Zuñiga told her that she needed to get an EKG for her insurance to continue to approve her adderall. She states that she is feeling well on her regimen of medications. She states that she has been taking a low dose of clonidine at night and it has changed her sleeping pattern. She states that she is sleeping 8 to 9 hours a night. She states that she is working out 3 to 4 times a week.     The patient states that she is checking her blood pressure at home occasionally. She states that she is still taking hydrochlorothiazide 25 mg every morning.    The patient states that she is still doing well on her Tremfya. She states that she is trying to get in to see her dermatologist for her year checkup. She states that she typically sees VIVIANE Watson.    The patient states that she is still seeing Idalia Katz RD; however, not as frequently. She states that she has an appointment in 12/2022. She states that she does not check her blood sugar at home. She denies checking her weight at home.  A1C today is 6.2 (up from 5.6).  Weight is stable.  She had the pneumonia vaccination earlier this year. She wants to wait on the influenza vaccination. She feels she is up to date on her tetanus vaccination.     The patient states that she is seeing gynecology.  Past Medical History:   Diagnosis Date   • Anxiety    • Bipolar disorder (HCC)    • Depression    • Diabetes mellitus type II, controlled (HCC)    • Disease of thyroid gland    • Hypertension      Past Surgical History:   Procedure Laterality Date   • TONSILLECTOMY  2001     Family History   Problem Relation Age of Onset   • Hypertension Father    • Bipolar disorder Father    • Depression Father    • Hypertension Mother    • Cancer Paternal Grandfather         lung (smoker)   • Heart disease Paternal Grandfather    • Arthritis Paternal Grandmother    • Depression Paternal Grandmother    • Hypertension Paternal Grandmother    • Arthritis Maternal Grandmother    • Depression Maternal Grandmother    • Hypertension Maternal Grandmother    • Hyperlipidemia Maternal Grandmother    • Cancer Maternal Grandfather         melanoma?     Social History     Tobacco Use   Smoking Status Never   Smokeless Tobacco Never     No Known Allergies    Current Outpatient Medications:   •  amphetamine-dextroamphetamine XR (ADDERALL XR) 25 MG 24 hr capsule, Take 1 capsule by mouth Every Morning, Disp: 30 capsule, Rfl: 0  •  cloNIDine (CATAPRES) 0.1 MG tablet, Take 1 tablet by mouth Every Night., Disp: 90 tablet, Rfl: 0  •  escitalopram (LEXAPRO) 20 MG tablet, Take 1 tablet by mouth Every Morning., Disp: 90 tablet, Rfl: 0  •  hydroCHLOROthiazide (HYDRODIURIL) 25 MG tablet, Take 1 tablet by mouth Daily., Disp: 90 tablet, Rfl: 1  •  levothyroxine (SYNTHROID, LEVOTHROID) 75 MCG tablet, TAKE ONE TABLET BY MOUTH DAILY, Disp: 90 tablet, Rfl: 1  •  metFORMIN (GLUCOPHAGE) 500 MG tablet, TAKE ONE TABLET BY MOUTH DAILY, Disp: 90 tablet, Rfl: 1  •  Norethindrone Acet-Ethinyl Est (Tessa 1.5/30) 1.5-30 MG-MCG tablet, Take 1  "tablet by mouth Daily., Disp: 63 tablet, Rfl: 1  •  Tremfya 100 MG/ML solution pen-injector, Every 3 (Three) Months., Disp: , Rfl:     Review of Systems   Constitutional: Negative for activity change, appetite change, chills, diaphoresis, fatigue, fever, unexpected weight gain and unexpected weight loss.   HENT: Negative for ear discharge, ear pain, mouth sores, nosebleeds, sinus pressure, sneezing and sore throat.    Eyes: Negative for pain, discharge and itching.   Respiratory: Negative for cough, chest tightness, shortness of breath and wheezing.    Cardiovascular: Negative for chest pain, palpitations and leg swelling.   Gastrointestinal: Negative for abdominal pain, constipation, diarrhea, nausea and vomiting.   Endocrine: Negative for heat intolerance, polydipsia and polyphagia.   Genitourinary: Negative for dysuria, flank pain, frequency, hematuria and urgency.   Musculoskeletal: Negative for arthralgias, back pain, gait problem, joint swelling, myalgias, neck pain and neck stiffness.   Skin: Negative for color change, pallor and rash.   Allergic/Immunologic: Negative for immunocompromised state.   Neurological: Negative for seizures, speech difficulty, weakness and numbness.   Hematological: Negative for adenopathy.   Psychiatric/Behavioral: Negative for agitation, decreased concentration, sleep disturbance, suicidal ideas and depressed mood. The patient is not nervous/anxious.        /66 (BP Location: Right arm, Patient Position: Sitting, Cuff Size: Adult)   Pulse 84   Temp 97.7 °F (36.5 °C) (Temporal)   Ht 174 cm (68.5\")   Wt 95.7 kg (211 lb)   BMI 31.61 kg/m²     Physical Exam  Constitutional:       Appearance: She is well-developed. She is obese.   HENT:      Head: Normocephalic and atraumatic.      Comments: *wearing mask  Eyes:      Conjunctiva/sclera: Conjunctivae normal.      Pupils: Pupils are equal, round, and reactive to light.   Cardiovascular:      Rate and Rhythm: Normal rate and " regular rhythm.      Pulses: Normal pulses.      Heart sounds: Normal heart sounds.   Pulmonary:      Effort: Pulmonary effort is normal.      Breath sounds: Normal breath sounds.   Musculoskeletal:         General: Normal range of motion.      Cervical back: Normal range of motion and neck supple.      Right lower leg: No edema.      Left lower leg: No edema.   Skin:     General: Skin is warm and dry.   Neurological:      Mental Status: She is alert and oriented to person, place, and time.   Psychiatric:         Mood and Affect: Mood normal.         Behavior: Behavior normal.         Thought Content: Thought content normal.         Judgment: Judgment normal.         Results Review:  I reviewed the patient's new clinical results.    PHQ-9 Total Score:        ECG 12 Lead    Date/Time: 11/16/2022 1:59 PM  Performed by: Yamilex Khan APRN  Authorized by: Yamilex Khan APRN   Comparison: not compared with previous ECG   Previous ECG: no previous ECG available  Rhythm: sinus rhythm  Rate: normal  BPM: 74  QRS axis: normal    Clinical impression: normal ECG            Assessment/Plan:  Diagnoses and all orders for this visit:    1. Type 2 diabetes mellitus without complication, without long-term current use of insulin ([facility]) (Primary)  -     POC Glycosylated Hemoglobin (Hb A1C)    2. Benign essential hypertension  -     hydroCHLOROthiazide (HYDRODIURIL) 25 MG tablet; Take 1 tablet by mouth Daily.  Dispense: 90 tablet; Refill: 1  -     ECG 12 Lead    3. Vitamin D deficiency    4. Long-term current use of stimulant  -     ECG 12 Lead    Other orders  -     Norethindrone Acet-Ethinyl Est (Tessa 1.5/30) 1.5-30 MG-MCG tablet; Take 1 tablet by mouth Daily.  Dispense: 63 tablet; Refill: 1       Type 2 diabetes mellitus without complication, without long-term current use of insulin (HCC) (Primary)  Comments:  Continue metformin 500 mg daily.    Essential hypertension  Comments:  Continue hydrochlorothiazide 25 mg  daily.    Hypothyroidism, unspecified type  Comments:  Continue levothyroxine.    Attention deficit hyperactivity disorder (ADHD), predominantly inattentive type  Comments:  Continue Adderall.      Fatty liver disease, nonalcoholic  Comments:  Continue healthy diet and exercise.    There are no Patient Instructions on file for this visit.    Counseling/Anticipatory Guidance:   Plan of care reviewed with patient at the conclusion of today's visit. Education was provided in regards to diagnosis, diet and exercise, cervical cancer screening, self breast exams, breast cancer screening, and the importance of yearly mammograms.   Nutrition, family planning/contraception, physical activity, healthy weight,ways to reduce stress, adequate sleep, injury prevention, misuse of tobacco, alcohol and drugs, sexual behavior and STD's, dental health, mental health, and immunizations.    Management and any prescribed or recommended OTC medications.  Patient verbalizes understanding of and agreement with management plan.    Return in about 6 months (around 5/16/2023) for Next scheduled follow up, Labs next visit.    Dictated Utilizing Dragon Dictation.      VIVIANE Rowan          Transcribed from ambient dictation for VIVIANE Rowan by Marta Kirkpatrick.  11/16/22   14:56 EST    Patient or patient representative verbalized consent to the visit recording.  I have personally performed the services described in this document as transcribed by the above individual, and it is both accurate and complete.

## 2022-11-22 ENCOUNTER — TELEMEDICINE (OUTPATIENT)
Dept: INTERNAL MEDICINE | Facility: CLINIC | Age: 27
End: 2022-11-22

## 2022-11-22 DIAGNOSIS — R50.9 FEVER AND CHILLS: Chronic | ICD-10-CM

## 2022-11-22 DIAGNOSIS — R05.1 ACUTE COUGH: Primary | ICD-10-CM

## 2022-11-22 DIAGNOSIS — J01.40 ACUTE NON-RECURRENT PANSINUSITIS: ICD-10-CM

## 2022-11-22 PROCEDURE — 99214 OFFICE O/P EST MOD 30 MIN: CPT | Performed by: INTERNAL MEDICINE

## 2022-11-22 RX ORDER — AMOXICILLIN AND CLAVULANATE POTASSIUM 875; 125 MG/1; MG/1
1 TABLET, FILM COATED ORAL 2 TIMES DAILY
Qty: 14 TABLET | Refills: 0 | Status: SHIPPED | OUTPATIENT
Start: 2022-11-22 | End: 2022-11-29

## 2022-11-22 NOTE — ASSESSMENT & PLAN NOTE
She will continue taking NyQuil in the evening.   She will continue Tylenol as needed for fever, chills, pain, and malaise.   She will increase Mucinex to 1200 mg twice a day.   She will add Flonase nasal spray twice a day.   Continue to drink plenty of fluids.   I advised her to do a home COVID-19 test and then send us a message this afternoon. If it is COVID-19, we will just continue the symptomatic treatment.   If it is not COVID-19, I will send an antibiotic to her pharmacy.

## 2022-11-22 NOTE — PROGRESS NOTES
[facility]     Isabell Oakes  1995   [id number]      Patient Care Team:  Yamilex Khan APRN as PCP - General (Internal Medicine)  Darling Watson APRN as Nurse Practitioner (Obstetrics and Gynecology)  Sherri Zuñiga APRN as Nurse Practitioner (Nurse Practitioner)  Lisa Treviño APRN as Nurse Practitioner (Family Medicine)    You have chosen to receive care through a telehealth visit.  Do you consent to use a video/audio connection for your medical care today? Yes     CC:fever chills      HPI  Patient is a 27 y.o. female presents with fever chills, cough, sore throat, PND, sinus pressure and pain, headache, chest congestion, fatigue, myalgias for couple days.  Temp up to 101 yesterday and today.  Sputum is yellowish.    Taking mucinex,tylenol, nyquil. Some help.  No shortness of breath.    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  She has not been around any ill contacts that she knows of, but has been around a lot of people.          CHRONIC CONDITIONS      Past Medical History:   Diagnosis Date   • Anxiety    • Bipolar disorder (HCC)    • Depression    • Diabetes mellitus type II, controlled (HCC)    • Disease of thyroid gland    • Hypertension        Past Surgical History:   Procedure Laterality Date   • TONSILLECTOMY  2001       Family History   Problem Relation Age of Onset   • Hypertension Father    • Bipolar disorder Father    • Depression Father    • Hypertension Mother    • Cancer Paternal Grandfather         lung (smoker)   • Heart disease Paternal Grandfather    • Arthritis Paternal Grandmother    • Depression Paternal Grandmother    • Hypertension Paternal Grandmother    • Arthritis Maternal Grandmother    • Depression Maternal Grandmother    • Hypertension Maternal Grandmother    • Hyperlipidemia Maternal Grandmother    • Cancer Maternal Grandfather         melanoma?       Social History      Socioeconomic History   • Marital status:    Tobacco Use   • Smoking status: Never   • Smokeless tobacco: Never   Vaping Use   • Vaping Use: Never used   Substance and Sexual Activity   • Alcohol use: Not Currently   • Drug use: Never   • Sexual activity: Yes     Partners: Male     Birth control/protection: OCP       No Known Allergies    Review of Systems:     Review of Systems    Vital Signs  There were no vitals filed for this visit.  There is no height or weight on file to calculate BMI.  BMI is >= 30 and <35. (Class 1 Obesity). The following options were offered after discussion;: weight loss educational material (shared in after visit summary), exercise counseling/recommendations and nutrition counseling/recommendations        Current Outpatient Medications:   •  amoxicillin-clavulanate (Augmentin) 875-125 MG per tablet, Take 1 tablet by mouth 2 (Two) Times a Day for 7 days., Disp: 14 tablet, Rfl: 0  •  amphetamine-dextroamphetamine XR (ADDERALL XR) 25 MG 24 hr capsule, Take 1 capsule by mouth Every Morning, Disp: 30 capsule, Rfl: 0  •  cloNIDine (CATAPRES) 0.1 MG tablet, Take 1 tablet by mouth Every Night., Disp: 90 tablet, Rfl: 0  •  escitalopram (LEXAPRO) 20 MG tablet, Take 1 tablet by mouth Every Morning., Disp: 90 tablet, Rfl: 0  •  hydroCHLOROthiazide (HYDRODIURIL) 25 MG tablet, Take 1 tablet by mouth Daily., Disp: 90 tablet, Rfl: 1  •  levothyroxine (SYNTHROID, LEVOTHROID) 75 MCG tablet, TAKE ONE TABLET BY MOUTH DAILY, Disp: 90 tablet, Rfl: 1  •  metFORMIN (GLUCOPHAGE) 500 MG tablet, TAKE ONE TABLET BY MOUTH DAILY, Disp: 90 tablet, Rfl: 1  •  Norethindrone Acet-Ethinyl Est (Tessa 1.5/30) 1.5-30 MG-MCG tablet, Take 1 tablet by mouth Daily., Disp: 63 tablet, Rfl: 1  •  Tremfya 100 MG/ML solution pen-injector, Every 3 (Three) Months., Disp: , Rfl:     Physical Exam:    Physical Exam  Constitutional:       General: She is not in acute distress.     Appearance: She is ill-appearing.   HENT:       Head: Normocephalic.   Eyes:      Extraocular Movements: Extraocular movements intact.      Conjunctiva/sclera: Conjunctivae normal.      Pupils: Pupils are equal, round, and reactive to light.   Pulmonary:      Effort: Pulmonary effort is normal.   Neurological:      Mental Status: She is alert.   Psychiatric:         Mood and Affect: Mood normal.         Thought Content: Thought content normal.          ACE III MINI        Results Review:    None    CMP:  Lab Results   Component Value Date    BUN 12 09/01/2022    CREATININE 0.77 09/01/2022    EGFRIFNONA 81 01/17/2022    BCR 15.6 09/01/2022     09/01/2022    K 4.7 09/01/2022    CO2 22.9 09/01/2022    CALCIUM 10.1 09/01/2022    ALBUMIN 4.10 09/01/2022    BILITOT 0.5 09/01/2022    ALKPHOS 92 09/01/2022    AST 73 (H) 09/01/2022     (H) 09/01/2022     HbA1c:  Lab Results   Component Value Date    HGBA1C 6.2 11/16/2022    HGBA1C 6.2 (A) 07/25/2022     Microalbumin:  Lab Results   Component Value Date    MICROALBUR <1.2 09/01/2022     Lipid Panel  Lab Results   Component Value Date    CHOL 210 (H) 09/01/2022    TRIG 140 09/01/2022    HDL 41 09/01/2022     (H) 09/01/2022    AST 73 (H) 09/01/2022     (H) 09/01/2022       Medication Review: Medications reviewed and noted  Patient Instructions   Problem List Items Addressed This Visit        ENT    Acute non-recurrent pansinusitis    Current Assessment & Plan     See above.            Symptoms and Signs    Fever and chills (Chronic)    Current Assessment & Plan     See above.            Other    Acute cough - Primary    Current Assessment & Plan     She will continue taking NyQuil in the evening.   She will continue Tylenol as needed for fever, chills, pain, and malaise.   She will increase Mucinex to 1200 mg twice a day.   She will add Flonase nasal spray twice a day.   Continue to drink plenty of fluids.   I advised her to do a home COVID-19 test and then send us a message this afternoon. If it  is COVID-19, we will just continue the symptomatic treatment.   If it is not COVID-19, I will send an antibiotic to her pharmacy.                Diagnosis Plan   1. Acute cough        2. Fever and chills        3. Acute non-recurrent pansinusitis                Plan of care reviewed with patient at the conclusion of today's visit. Education was provided regarding diagnosis, management, and any prescribed or recommended OTC medications.Patient verbalizes understanding of and agreement with management plan.         Carol Whitmore MD    Transcribed from ambient dictation for Carol Whitmore MD by Rosa Brice.  11/22/22   17:31 EST    Patient or patient representative verbalized consent to the visit recording.  I have personally performed the services described in this document as transcribed by the above individual, and it is both accurate and complete.  Carol Whitmore MD  11/25/2022  16:27 EST

## 2022-11-25 NOTE — PATIENT INSTRUCTIONS
Patient Instructions   Problem List Items Addressed This Visit          ENT    Acute non-recurrent pansinusitis    Current Assessment & Plan     See above.            Symptoms and Signs    Fever and chills (Chronic)    Current Assessment & Plan     See above.            Other    Acute cough - Primary    Current Assessment & Plan     She will continue taking NyQuil in the evening.   She will continue Tylenol as needed for fever, chills, pain, and malaise.   She will increase Mucinex to 1200 mg twice a day.   She will add Flonase nasal spray twice a day.   Continue to drink plenty of fluids.   I advised her to do a home COVID-19 test and then send us a message this afternoon. If it is COVID-19, we will just continue the symptomatic treatment.   If it is not COVID-19, I will send an antibiotic to her pharmacy.

## 2023-01-03 DIAGNOSIS — F90.8 ADHD, ADULT RESIDUAL TYPE: Chronic | ICD-10-CM

## 2023-01-03 NOTE — TELEPHONE ENCOUNTER
Patient needs refills. Patient states on voicemail she will call tomorrow to make a follow up appointment. Please advise.

## 2023-01-04 RX ORDER — DEXTROAMPHETAMINE SACCHARATE, AMPHETAMINE ASPARTATE MONOHYDRATE, DEXTROAMPHETAMINE SULFATE AND AMPHETAMINE SULFATE 6.25; 6.25; 6.25; 6.25 MG/1; MG/1; MG/1; MG/1
25 CAPSULE, EXTENDED RELEASE ORAL EVERY MORNING
Qty: 30 CAPSULE | Refills: 0 | Status: SHIPPED | OUTPATIENT
Start: 2023-01-04 | End: 2023-01-24 | Stop reason: SDUPTHER

## 2023-01-14 DIAGNOSIS — F90.8 ADHD, ADULT RESIDUAL TYPE: Chronic | ICD-10-CM

## 2023-01-14 DIAGNOSIS — F41.1 GENERALIZED ANXIETY DISORDER: Chronic | ICD-10-CM

## 2023-01-14 DIAGNOSIS — F51.04 INSOMNIA, PSYCHOPHYSIOLOGICAL: Chronic | ICD-10-CM

## 2023-01-16 RX ORDER — CLONIDINE HYDROCHLORIDE 0.1 MG/1
TABLET ORAL
Qty: 90 TABLET | Refills: 0 | Status: SHIPPED | OUTPATIENT
Start: 2023-01-16 | End: 2023-04-05 | Stop reason: SDUPTHER

## 2023-01-24 ENCOUNTER — TELEMEDICINE (OUTPATIENT)
Dept: PSYCHIATRY | Facility: CLINIC | Age: 28
End: 2023-01-24
Payer: COMMERCIAL

## 2023-01-24 DIAGNOSIS — F33.0 MILD EPISODE OF RECURRENT MAJOR DEPRESSIVE DISORDER: Chronic | ICD-10-CM

## 2023-01-24 DIAGNOSIS — F51.04 INSOMNIA, PSYCHOPHYSIOLOGICAL: Chronic | ICD-10-CM

## 2023-01-24 DIAGNOSIS — F41.1 GENERALIZED ANXIETY DISORDER: Chronic | ICD-10-CM

## 2023-01-24 DIAGNOSIS — F90.8 ADHD, ADULT RESIDUAL TYPE: Primary | Chronic | ICD-10-CM

## 2023-01-24 PROCEDURE — 99214 OFFICE O/P EST MOD 30 MIN: CPT | Performed by: NURSE PRACTITIONER

## 2023-01-24 RX ORDER — DEXTROAMPHETAMINE SACCHARATE, AMPHETAMINE ASPARTATE MONOHYDRATE, DEXTROAMPHETAMINE SULFATE AND AMPHETAMINE SULFATE 6.25; 6.25; 6.25; 6.25 MG/1; MG/1; MG/1; MG/1
25 CAPSULE, EXTENDED RELEASE ORAL EVERY MORNING
Qty: 30 CAPSULE | Refills: 0 | Status: SHIPPED | OUTPATIENT
Start: 2023-01-24 | End: 2023-03-07 | Stop reason: SDUPTHER

## 2023-01-24 RX ORDER — ESCITALOPRAM OXALATE 20 MG/1
20 TABLET ORAL EVERY MORNING
Qty: 90 TABLET | Refills: 0 | Status: SHIPPED | OUTPATIENT
Start: 2023-01-24

## 2023-01-24 NOTE — PROGRESS NOTES
"This provider is located at the [facility] (through [facility] [place] [facility]), [address], [facility], [address] using a secure [employer] Video Visit through [employer]. Patient is being seen remotely via telehealth at their home address in [place], and stated they are in a secure environment for this session. The patient's condition being diagnosed/treated is appropriate for telemedicine. The provider identified herself as well as her credentials. The patient, and/or patients guardian, consent to be seen remotely, and when consent is given they understand that the consent allows for patient identifiable information to be sent to a third party as needed. They may refuse to be seen remotely at any time. The electronic data is encrypted and password protected, and the patient and/or guardian has been advised of the potential risks to privacy not withstanding such measures.    You have chosen to receive care through a telehealth visit.  Do you consent to use a video/audio connection for your medical care today? Yes     Patient identifiers utilized: Name and date of birth.    Patient verbally confirmed consent for today's encounter 1/24/23.        Subjective   Isabell Oakes is a 27 y.o. female who is here today for medication management follow up.    Chief Complaint: ADHD, depression, anxiety, insomnia     History of Present Illness:  The patient describes her mood as \"good\" over the last few weeks.  The patient endorses that she has been doing really well over the past few months.  She states that her symptoms have continued to be well-controlled with her current treatment regimen.  She states that she started therapy via in-person services, so states that she cancelled her virtual therapy appointments with this clinic.  She states that therapy has been doing well thus far.  She states that her therapist is very knowledgeable about ADHD and states that this has been very helpful for " her.  The patient reports her appetite as good.  The patient reports her sleep as good.  The patient reports she has continued to sleep pretty well with the Clonidine.  She states she does occasionally have some difficulty staying asleep and will awaken in the early morning before her alarm, but endorses that otherwise she sleeps well.  The patient denies any nightmares or bad dreams.  The patient denies any new medical problems or changes in medications since last appointment with this facility.  The patient reports compliance with current medication regimen.  The patient denies any current side effects from her current medication regimen.  The patient denies any abnormal muscle movements or tics.  The patient rates her ADHD at a 4/10 on a 0-10 scale, with 10 being the worst.  The patient rates her depression at a 0-1/10 on a 0-10 scale, with 10 being the worst.  The patient rates her anxiety at a 1-2/10 on a 0-10 scale, with 10 being the worst.  The patient rates hopelessness at a 0/10 on a 0-10 scale with 10 being the worst.  The patient would like to not adjust or change her medications at this visit.  The patient denies suicidal ideations and homicidal ideations, and is convincing.  The patient denies any auditory hallucinations or visual hallucinations.  The patient does not endorse significant symptoms consistent with bipolar disorder or a psychotic illness.            LMP:  States that she doesn't have regular menstrual cycles due to her birth control.  The patient denies any chance of pregnancy at this time.  The patient was educated that her prescribed medications can have potential risk to a developing fetus. The patient is advised to contact this APRN/this office if she becomes pregnant or plans to become pregnant.  Pt verbalizes understanding and acknowledged agreement with this plan in her own words.        Prior Psychiatric Medications:  Zoloft - initially it was effective for depression and anxiety,  "but endorses that eventually it lost efficacy and she was switched to Lexapro.  Vraylar - caused severe exacerbation of anxiety  Latuda - reports made her \"feel weird\", caused excessive sedation and GI side effects  Wellbutrin  mg daily - ineffective for ADHD and increased anxiety   Strattera 25 mg daily - ineffective for ADHD and caused fatigue   Qelbree - caused significant nausea      The following portions of the patient's history were reviewed and updated as appropriate: allergies, current medications, past family history, past medical history, past social history, past surgical history and problem list.    Review of Systems   Constitutional: Negative for activity change, appetite change, fatigue and unexpected weight change.   Psychiatric/Behavioral: Positive for decreased concentration. Negative for agitation, behavioral problems, confusion, dysphoric mood, hallucinations, self-injury, sleep disturbance and suicidal ideas. The patient is nervous/anxious. The patient is not hyperactive.        Objective   Physical Exam  Constitutional:       Appearance: Normal appearance.   Neurological:      Mental Status: She is alert.   Psychiatric:         Attention and Perception: Attention and perception normal.         Mood and Affect: Mood and affect normal.         Speech: Speech normal.         Behavior: Behavior normal. Behavior is cooperative.         Thought Content: Thought content normal. Thought content does not include homicidal or suicidal ideation. Thought content does not include homicidal or suicidal plan.         Cognition and Memory: Cognition and memory normal.         Judgment: Judgment normal.       not currently breastfeeding.    The patient was seen remotely today via a MyChart Video Visit through [facility].  Unable to obtain vital signs due to nature of remote visit.  Height stated at 68.5 inches.  Weight stated at 211 pounds.     No Known Allergies    Recent Results (from the past 2016 hour(s)) "   POC Glycosylated Hemoglobin (Hb A1C)    Collection Time: 11/16/22  1:44 PM    Specimen: Blood   Result Value Ref Range    Hemoglobin A1C 6.2 %    Lot Number [id number]     Expiration Date 09/14/2024        Current Medications:   Current Outpatient Medications   Medication Sig Dispense Refill   • amphetamine-dextroamphetamine XR (ADDERALL XR) 25 MG 24 hr capsule Take 1 capsule by mouth Every Morning 30 capsule 0   • cloNIDine (CATAPRES) 0.1 MG tablet TAKE ONE TABLET BY MOUTH ONCE NIGHTLY 90 tablet 0   • escitalopram (LEXAPRO) 20 MG tablet Take 1 tablet by mouth Every Morning. 90 tablet 0   • hydroCHLOROthiazide (HYDRODIURIL) 25 MG tablet Take 1 tablet by mouth Daily. 90 tablet 1   • levothyroxine (SYNTHROID, LEVOTHROID) 75 MCG tablet TAKE ONE TABLET BY MOUTH DAILY 90 tablet 1   • metFORMIN (GLUCOPHAGE) 500 MG tablet TAKE ONE TABLET BY MOUTH DAILY 90 tablet 1   • Norethindrone Acet-Ethinyl Est (Tessa 1.5/30) 1.5-30 MG-MCG tablet Take 1 tablet by mouth Daily. 63 tablet 1   • Tremfya 100 MG/ML solution pen-injector Every 3 (Three) Months.       No current facility-administered medications for this visit.       Mental Status Exam:   Hygiene:   good  Cooperation:  Cooperative  Eye Contact:  Good  Psychomotor Behavior:  Appropriate  Affect:  Full range  Hopelessness: Denies  Speech:  Normal  Thought Process:  Goal directed and Linear  Thought Content:  Normal  Suicidal:  None  Homicidal:  None  Hallucinations:  None  Delusion:  None  Memory:  Intact  Orientation:  Person, Place, Time and Situation  Reliability:  good  Insight:  Good  Judgement:  Good  Impulse Control:  Good  Physical/Medical Issues:  Yes See medical history      PHQ-2 Depression Screening  Little interest or pleasure in doing things? 0-->not at all   Feeling down, depressed, or hopeless? 0-->not at all   PHQ-2 Total Score 0         12-lead ECG completed on 11/16/22 and confirmed by patient's PCP Yamilex Khan APRN was reviewed by this APRN at  today's encounter.  No abnormalities noted to rule out patient's candidacy for continuation of stimulant medication for treatment of ADHD at this time.  Discussed with patient that we plan to periodically repeat ECG testing in the future for continued monitoring of patient's candidacy for continued stimulant use, and will repeat ECG testing sooner if there are any changes in the patient's medical history or as needed/any concerns arise.  The patient verbalizes understanding and agreement in their own words.      The [facility] report, request number [id number], of the past 12 months were reviewed and is appropriate.  The patient/guardian reports taking the medication only as prescribed.  The patient/guardian denies any abuse or misuse of the medication.  The patient/guardian denies any other substance use or issues.  There are no apparent substance related issues.  The patient reports no side effects of the current medication usage.  The patient/guardian has reported significant improvement with medication usage and wishes to continue medication as prescribed.  The patient/guardian is appropriate to continue with current medication usage at this time.  Reinforced risks and side effects of medication usage, patient and/or guardian verbalize understanding in their own words and are in agreement with current plan.        Assessment & Plan   Diagnoses and all orders for this visit:    1. ADHD, adult residual type (Primary)  -     amphetamine-dextroamphetamine XR (ADDERALL XR) 25 MG 24 hr capsule; Take 1 capsule by mouth Every Morning  Dispense: 30 capsule; Refill: 0    2. Generalized anxiety disorder  -     escitalopram (LEXAPRO) 20 MG tablet; Take 1 tablet by mouth Every Morning.  Dispense: 90 tablet; Refill: 0    3. Mild episode of recurrent major depressive disorder (HCC)  -     escitalopram (LEXAPRO) 20 MG tablet; Take 1 tablet by mouth Every Morning.  Dispense: 90 tablet; Refill: 0    4. Insomnia,  psychophysiological            Visit Diagnoses:    ICD-10-CM ICD-9-CM   1. ADHD, adult residual type  F90.8 314.01   2. Generalized anxiety disorder  F41.1 300.02   3. Mild episode of recurrent major depressive disorder (HCC)  F33.0 296.31   4. Insomnia, psychophysiological  F51.04 307.42       GOALS:  Short Term Goals: Patient will be compliant with medication, and patient will have no significant medication related side effects.  Patient will be engaged in psychotherapy as indicated.  Patient will report subjective improvement of symptoms.  Long term goals: To stabilize mood and treat/improve subjective symptoms, the patient will stay out of the hospital, the patient will be at an optimal level of functioning, and the patient will take all medications as prescribed.  The patient verbalized understanding and agreement with goals that were mutually set.      SUICIDE RISK ASSESSMENT: Unalterable demographics and a history of mental health intervention indicate this patient is in a high risk category compared to the general population. At present, the patient denies active SI/HI, intentions, or plans at this time and agrees to seek immediate care should such thoughts develop. The patient verbalizes understanding of how to access emergency care if needed and agrees to do so. Consideration of suicide risk and protective factors such as history, current presentation, individual strengths and weaknesses, psychosocial and environmental stressors and variables, psychiatric illness and symptoms, medical conditions and pain, took place in this interview. Based on those considerations, the patient is determined: within individual baseline and presenting no imminent risk for suicide or homicide. Other recommendations: The patient does not meet the criteria for inpatient admission and is not a safety risk to self or others at today's visit. Inpatient treatment offers no significant advantages over outpatient treatment for this  patient at today's visit.      SAFETY PLAN:  Patient was given ample time for questions and fully participated in treatment planning.  Patient was encouraged to call the clinic with any questions or concerns.  Patient was informed of access to emergency care. If patient were to develop any significant symptomatology, suicidal ideation, homicidal ideation, any concerns, or feel unsafe at any time they are to call the clinic and if unable to get immediate assistance should immediately call 911 or go to the nearest emergency room.  The patient is advised to remove or secure (lock away) all lethal weapons (including guns) and sharps (including razors, scissors, knives, etc.).  All medications (including any prescribed and any over the counter medications) should be stored in a safe and secured location that is not obtainable by children/adolescents.  Patient was given an opportunity and encouraged to ask questions about their medication, illness, and treatment. Patient contracted verbally for the following: If you are experiencing an emotional crisis or have thoughts of harming yourself or others, please go to your nearest local emergency room or call 911. Will continue to re-assess medication response and side effects frequently to establish efficacy and ensure safety. Risks, any black box warnings, side effects, off label usage, and benefits of medication and treatment discussed with patient, along with potential adverse side effects of current and/or newly prescribed medication, alternative treatment options, and OTC medications.  Patient verbalized understanding of potential risks, any off label use of medication, any black box warnings, and any side effects in their own words. The patient verbalized understanding and agreed to comply with the safety plan discussed in their own words.  Patient given the number to the office. Number also available to the 24- hour suicide hotline.       TREATMENT PLAN/GOALS: Continue  medications and treatment plan as indicated. Treatment and medication options discussed during today's visit. Patient acknowledged and verbally consented to continue with current treatment plan and was educated on the importance of compliance with treatment and follow-up appointments.        -Continue Adderall XR 25 mg daily for ADHD  -Continue Lexapro 20 mg daily for depression/anxiety  -Continue Clonidine 0.1 mg nightly for insomnia and as adjunct for ADHD/anxiety   -Discussed with the patient that stimulants can only be prescribed up to a 30-day supply with no refills by this APRN.  Discussed with the patient that since next follow-up visit with this APRN is scheduled for approximately 3 months, they will have to request a refill for stimulant(s) prior to next scheduled follow-up appointment.  Instructed patient to request a refill approximately 4-5 days before running out of medication via the [employer] gil or by calling the clinic to ensure that refill can be sent in an appropriate time and they do not run out of medication.  The patient verbalizes understanding and endorses that they are agreeable to this.        MEDICATION ISSUES: Discussed medication options and treatment plan of prescribed medication, any off label use of medication, as well as the risks, benefits, any black box warnings including increased suicidality, and side effects including but not limited to potential falls, dizziness, possible impaired driving, GI side effects (change in appetite, abdominal discomfort, nausea, vomiting, diarrhea, and/or constipation), dry mouth, somnolence, sedation, insomnia, activation, agitation, irritation, tremors, abnormal muscle movements or disorders, headache, sweating, possible bruising or rare bleeding, electrolyte and/or fluid abnormalities, change in blood pressure/heart rate/and or heart rhythm, sexual dysfunction, and metabolic adversities among others. Patient and/or guardian agreeable to call the  office with any worsening of symptoms or onset of side effects, or if any concerns or questions arise.  The contact information for the office is made available to the patient and/or guardian.  Patient and/or guardian agreeable to call 911 or go to the nearest ER should they begin having any SI/HI, or if any urgent concerns arise. No medication side effects or related complaints today.    The patient is being prescribed a controlled substance as part of the treatment plan. The patient/guardian has been educated of appropriate use of the medication(s), including risks and side effects such as insomnia, headache, exacerbation of tics, nervousness, irritability, overstimulation, tremor, dizziness, anorexia or change in appetite, nausea, dry mouth, constipation, diarrhea, weight loss, sexual dysfunction, psychotic episodes, seizures, palpitations, tachycardia, hypertension, rare activation (activation of hypomania, rozina, and/or suicidal ideations), cardiovascular adverse effects including sudden death (especially patients with pre-existing structural abnormalities often associated with a family history of cardiac disease), may slow normal growth in children, weight gain is reported but not expected, sedation is possible but activation is much more common, metabolic adversities, and overdose among others. Patient/guardian is also informed that the medication is to be used by the patient only, the medication is to be used only as directed, and the medication should not be combined with other substances unless directed by a Provider/Prescriber. The patient/guardian verbalized understanding and agreement with this in their own words, and wish to continue with current treatment plan.    Without the prescribed medication for ADHD, it is reported that the patient has problems with attention and focus including being easily distracted, easily losing objects, trouble with time management, trouble completing tasks because of  distractions, procrastination, indecisiveness, careless mistakes in daily activities/work/school, and not finishing jobs that are started.  Patient denies any side effects, no worsening of insomnia, and no worsening of anxiety on the medication dose.  Due to the reported problems without the medication usage, as well as the significant improvement in symptoms with the medication usage, the patient requests to remain on the prescribed medication for ADHD.                    VERBAL INFORMED CONSENT FOR MEDICATION:  The patient was educated that their proposed/prescribed psychotropic medication(s) has potential risks, side effects, adverse effects, and black box warnings; and these have been discussed with the patient.  The patient has been informed that their treatment and medication dosage is to be individualized, and may even be above or below the recommended range/dosage due to patient individualization and response, but medication is prescribed using a shared decision making approach, and no medication or dosage will be prescribed without the patient's verbal consent.  The reason for the use of the medication including any off label use and alternative modes of treatment other than or in addition to medication has been considered and discussed, the probable consequences of not receiving the proposed treatment have been discussed, and any treatment side effects, black box warnings, and cautions associated with treatment have been discussed with the patient.  The patient is allowed ample time to openly discuss and ask questions regarding the proposed medication(s) and treatment plan and the patient verbalizes understanding the reasons for the use of the medication, its potential risks and benefits, other alternative treatment(s), and the probable consequences that may occur if the proposed medication is not given.  The patient has been given ample time to ask questions and study the information and find the  information to be specific, accurate, and complete.  The patient gives verbal consent for the medication(s) proposed/prescribed, they verbalized understanding that they can refuse and withdraw consent at any time with the assistance of this APRN, and the patient has verbally confirmed that they are aware, and are willing, to take the prescribed medication and follow the treatment plan with the known possible risks, side effect, black box warnings, and any potential medication interactions, and the patient reports they will be worse off without this medication and treatment plan.  The patient is advised to contact this APRN/this office if any questions or concerns arise at any time (at [phone]), or call 911/go to the closest emergency department if needed or outside of office hours.            [facility] No Show Policy:  We understand unexpected circumstances arise; however, anytime you miss your appointment we are unable to provide you appropriate care.  In addition, each appointment missed could have been used to provide care for others.  We ask that you call at least 24 hours in advance to cancel or reschedule an appointment.  We would like to take this opportunity to remind you of our policy stating patients who miss THREE or more appointments without cancelling or rescheduling 24 hours in advance of the appointment may be subject to cancellation of any further visits with our clinic and recommendation to seek in-person services/visits.    Please call [phone] to reschedule your appointment. If there are reasons that make it difficult for you to keep the appointments, please call and let us know how we can help.  Please understand that medication prescribing will not continue without seeing your provider.      [facility]'s No Show Policy reviewed with patient at today's visit. Patient verbalized understanding of this policy. Discussed with patient that in  the event that there are three or more no show visits, it will be recommended that they pursue in-person services/visits as noncompliance with telehealth visits indicates that patient is not an appropriate candidate for telemedicine and would likely be more appropriate for in-person services/visits. Patient verbalizes understanding and is agreeable to this.           MEDS ORDERED DURING VISIT:  New Medications Ordered This Visit   Medications   • amphetamine-dextroamphetamine XR (ADDERALL XR) 25 MG 24 hr capsule     Sig: Take 1 capsule by mouth Every Morning     Dispense:  30 capsule     Refill:  0   • escitalopram (LEXAPRO) 20 MG tablet     Sig: Take 1 tablet by mouth Every Morning.     Dispense:  90 tablet     Refill:  0       Return in about 3 months (around 4/24/2023), or if symptoms worsen or fail to improve, for Recheck.        Patient will follow-up in 3 months, highly encouraged the patient if she had any questions or concerns to contact the behavioral health virtual clinic for sooner appointment patient verbalized understanding.      Functional Status: Mild impairment     Prognosis: Good with Ongoing Treatment              This document has been electronically signed by VIVIANE Dash  January 24, 2023 15:33 EST      Some of the data in this electronic note has been brought forward from a previous encounter, any necessary changes have been made, it has been reviewed by this APRN, and it is accurate.       Part of this note may be an electronic transcription/translation of spoken language to printed text using the Dragon Dict[employer] System.

## 2023-01-30 DIAGNOSIS — E11.9 DIABETES MELLITUS, NEW ONSET: ICD-10-CM

## 2023-02-22 ENCOUNTER — OFFICE VISIT (OUTPATIENT)
Dept: INTERNAL MEDICINE | Facility: CLINIC | Age: 28
End: 2023-02-22
Payer: COMMERCIAL

## 2023-02-22 VITALS
BODY MASS INDEX: 31.84 KG/M2 | HEART RATE: 84 BPM | TEMPERATURE: 98.4 F | SYSTOLIC BLOOD PRESSURE: 128 MMHG | WEIGHT: 215 LBS | HEIGHT: 69 IN | DIASTOLIC BLOOD PRESSURE: 74 MMHG

## 2023-02-22 DIAGNOSIS — K76.0 HEPATIC STEATOSIS: ICD-10-CM

## 2023-02-22 DIAGNOSIS — I10 BENIGN ESSENTIAL HYPERTENSION: ICD-10-CM

## 2023-02-22 DIAGNOSIS — E66.9 OBESITY (BMI 30.0-34.9): ICD-10-CM

## 2023-02-22 DIAGNOSIS — E11.9 TYPE 2 DIABETES MELLITUS WITHOUT COMPLICATION, WITHOUT LONG-TERM CURRENT USE OF INSULIN: Primary | ICD-10-CM

## 2023-02-22 LAB
EXPIRATION DATE: NORMAL
HBA1C MFR BLD: 6 %
Lab: NORMAL

## 2023-02-22 PROCEDURE — 90471 IMMUNIZATION ADMIN: CPT | Performed by: NURSE PRACTITIONER

## 2023-02-22 PROCEDURE — 90715 TDAP VACCINE 7 YRS/> IM: CPT | Performed by: NURSE PRACTITIONER

## 2023-02-22 PROCEDURE — 99214 OFFICE O/P EST MOD 30 MIN: CPT | Performed by: NURSE PRACTITIONER

## 2023-02-22 PROCEDURE — 83036 HEMOGLOBIN GLYCOSYLATED A1C: CPT | Performed by: NURSE PRACTITIONER

## 2023-02-22 RX ORDER — HYDROCHLOROTHIAZIDE 25 MG/1
25 TABLET ORAL DAILY
Qty: 90 TABLET | Refills: 1 | Status: SHIPPED | OUTPATIENT
Start: 2023-02-22

## 2023-02-22 RX ORDER — LEVOTHYROXINE SODIUM 0.07 MG/1
75 TABLET ORAL DAILY
Qty: 90 TABLET | Refills: 1 | Status: SHIPPED | OUTPATIENT
Start: 2023-02-22

## 2023-02-22 NOTE — PROGRESS NOTES
Isabell Oakes  1995  [id number]  Patient Care Team:  Yamilex Khan APRN as PCP - General (Internal Medicine)  Darling Watson APRN as Nurse Practitioner (Obstetrics and Gynecology)  Sherri Zuñiga APRN as Nurse Practitioner (Nurse Practitioner)  Lisa Treviño APRN as Nurse Practitioner (Family Medicine)    Isabell Oakes is a pleasant 27 y.o. female who presents for evaluation of Diabetes and Hypertension    Chief Complaint   Patient presents with   • Diabetes   • Hypertension       HPI:   Juanjo has a pertinent medical history including diabetes, hypothyroidism, hypertension, hyperlipidemia, morbid obesity, and psoriasis. She is working full time at [employer] after resigning from full time job as a .  She sees mental health provider at [facility]. She also sees a dietician, Idalia Katz. She is here for a routine 3-month follow-up.    She is doing well and continues to exercise 3 to 4 times per week.  She drinks 60 to 70 ounces of water per day. She denies any palpitations or irregular heartbeat.    She has not received the COVID-19 vaccine. She did not react well to the second booster. She received the hepatitis B vaccine prior to traveling to [place] in 2016. She received the influenza vaccine in 2021.    Liver enzymes were mildly improved on recent labs.  In September , AST 73.  Now ALT 67, AST 37.  Past Medical History:   Diagnosis Date   • Anxiety    • Bipolar disorder (HCC)    • Depression    • Diabetes mellitus type II, controlled (HCC)    • Disease of thyroid gland    • Hypertension      Past Surgical History:   Procedure Laterality Date   • TONSILLECTOMY  2001     Family History   Problem Relation Age of Onset   • Diabetes Mother    • Hypertension Mother    • Hypertension Father    • Bipolar disorder Father    • Depression Father    • Arthritis Maternal Grandmother    • Depression Maternal Grandmother    • Hypertension Maternal  "Grandmother    • Hyperlipidemia Maternal Grandmother    • Cancer Maternal Grandfather         melanoma?   • Arthritis Paternal Grandmother    • Depression Paternal Grandmother    • Hypertension Paternal Grandmother    • Cancer Paternal Grandfather         lung (smoker)   • Heart disease Paternal Grandfather      Social History     Tobacco Use   Smoking Status Never   Smokeless Tobacco Never     No Known Allergies    Current Outpatient Medications:   •  amphetamine-dextroamphetamine XR (ADDERALL XR) 25 MG 24 hr capsule, Take 1 capsule by mouth Every Morning, Disp: 30 capsule, Rfl: 0  •  cloNIDine (CATAPRES) 0.1 MG tablet, TAKE ONE TABLET BY MOUTH ONCE NIGHTLY, Disp: 90 tablet, Rfl: 0  •  escitalopram (LEXAPRO) 20 MG tablet, Take 1 tablet by mouth Every Morning., Disp: 90 tablet, Rfl: 0  •  hydroCHLOROthiazide (HYDRODIURIL) 25 MG tablet, Take 1 tablet by mouth Daily., Disp: 90 tablet, Rfl: 1  •  levothyroxine (SYNTHROID, LEVOTHROID) 75 MCG tablet, Take 1 tablet by mouth Daily., Disp: 90 tablet, Rfl: 1  •  metFORMIN (GLUCOPHAGE) 500 MG tablet, TAKE ONE TABLET BY MOUTH DAILY, Disp: 90 tablet, Rfl: 1  •  Norethindrone Acet-Ethinyl Est (Tessa 1.5/30) 1.5-30 MG-MCG tablet, Take 1 tablet by mouth Daily., Disp: 63 tablet, Rfl: 1  •  Tremfya 100 MG/ML solution pen-injector, Every 3 (Three) Months., Disp: , Rfl:     Review of Systems  Review of systems was completed, and pertinent findings are noted in the HPI.  /74 (BP Location: Left arm, Patient Position: Sitting, Cuff Size: Adult)   Pulse 84   Temp 98.4 °F (36.9 °C) (Temporal)   Ht 174 cm (68.5\")   Wt 97.5 kg (215 lb)   BMI 32.21 kg/m²     Physical Exam  Constitutional:       Appearance: She is well-developed.   HENT:      Head: Normocephalic and atraumatic.      Comments: *wearing mask  Eyes:      Conjunctiva/sclera: Conjunctivae normal.      Pupils: Pupils are equal, round, and reactive to light.   Cardiovascular:      Rate and Rhythm: Normal rate and regular " rhythm.      Pulses: Normal pulses.      Heart sounds: Normal heart sounds.   Pulmonary:      Effort: Pulmonary effort is normal.      Breath sounds: Normal breath sounds.   Musculoskeletal:         General: Normal range of motion.      Cervical back: Normal range of motion and neck supple.   Skin:     General: Skin is warm and dry.   Neurological:      Mental Status: She is alert and oriented to person, place, and time.   Psychiatric:         Mood and Affect: Mood normal.         Behavior: Behavior normal.         Thought Content: Thought content normal.         Judgment: Judgment normal.         Procedures    PHQ-9 Total Score:      Assessment/Plan:  Diagnoses and all orders for this visit:    1. Type 2 diabetes mellitus without complication, without long-term current use of insulin (HCC) (Primary)  -     POC Glycosylated Hemoglobin (Hb A1C)    2. Benign essential hypertension  -     hydroCHLOROthiazide (HYDRODIURIL) 25 MG tablet; Take 1 tablet by mouth Daily.  Dispense: 90 tablet; Refill: 1    3. Obesity (BMI 30.0-34.9)    4. Hepatic steatosis    Other orders  -     levothyroxine (SYNTHROID, LEVOTHROID) 75 MCG tablet; Take 1 tablet by mouth Daily.  Dispense: 90 tablet; Refill: 1  -     Tdap Vaccine Greater Than or Equal To 8yo IM       1. Diabetes  Comments:  The patient's A1c is well controlled at this time. She will continue her current medication regimen as prescribed.     2. Hypertension  Comments:  The patient will continue her current medication regimen prescribed.     3. Hypothyroidism  Comments:  The patient will continue her levothyroxine.    4. Health maintenance  Comments:  The patient will receive her tetanus vaccine on 02/22/2023.       There are no Patient Instructions on file for this visit.  Plan of care reviewed with patient at the conclusion of today's visit. Education was provided regarding diagnosis, management and any prescribed or recommended OTC medications.  Patient verbalizes  understanding of and agreement with management plan.    Return in about 3 months (around 5/22/2023) for Annual physical.    Transcribed from ambient dictation for VIVIANE Rowan by Kareen Snowden.  02/22/23   16:25 EST      VIVIANE Rowan

## 2023-02-26 PROBLEM — J01.40 ACUTE NON-RECURRENT PANSINUSITIS: Status: RESOLVED | Noted: 2022-11-22 | Resolved: 2023-02-26

## 2023-02-26 PROBLEM — R05.1 ACUTE COUGH: Status: RESOLVED | Noted: 2021-09-09 | Resolved: 2023-02-26

## 2023-02-26 PROBLEM — R79.89 ELEVATED LIVER FUNCTION TESTS: Status: RESOLVED | Noted: 2021-06-21 | Resolved: 2023-02-26

## 2023-02-26 PROBLEM — R50.9 FEVER AND CHILLS: Chronic | Status: RESOLVED | Noted: 2022-11-22 | Resolved: 2023-02-26

## 2023-03-07 DIAGNOSIS — F90.8 ADHD, ADULT RESIDUAL TYPE: Chronic | ICD-10-CM

## 2023-03-07 RX ORDER — DEXTROAMPHETAMINE SACCHARATE, AMPHETAMINE ASPARTATE MONOHYDRATE, DEXTROAMPHETAMINE SULFATE AND AMPHETAMINE SULFATE 6.25; 6.25; 6.25; 6.25 MG/1; MG/1; MG/1; MG/1
25 CAPSULE, EXTENDED RELEASE ORAL EVERY MORNING
Qty: 30 CAPSULE | Refills: 0 | Status: SHIPPED | OUTPATIENT
Start: 2023-03-07 | End: 2023-04-05 | Stop reason: SDUPTHER

## 2023-03-09 ENCOUNTER — TELEPHONE (OUTPATIENT)
Dept: INTERNAL MEDICINE | Facility: CLINIC | Age: 28
End: 2023-03-09
Payer: COMMERCIAL

## 2023-03-14 ENCOUNTER — TELEPHONE (OUTPATIENT)
Dept: INTERNAL MEDICINE | Facility: CLINIC | Age: 28
End: 2023-03-14
Payer: COMMERCIAL

## 2023-03-14 NOTE — TELEPHONE ENCOUNTER
I have been calling the patient since 03/09 to verify if this is something the patient requested. I called her again today and she did not request a dexcom nor did she ask [facility] to contact us for an order. She was just looking at their website. I called [facility] and cancelled dexcom order with them.

## 2023-03-14 NOTE — TELEPHONE ENCOUNTER
Kimberly from the Dexcom start program with [facility] pharmacy called to confirm if we had gotten a fax they had sent on 3/7 and 3/8 for this patient    [phone]  [phone]

## 2023-04-05 DIAGNOSIS — F51.04 INSOMNIA, PSYCHOPHYSIOLOGICAL: Chronic | ICD-10-CM

## 2023-04-05 DIAGNOSIS — F90.8 ADHD, ADULT RESIDUAL TYPE: Chronic | ICD-10-CM

## 2023-04-05 DIAGNOSIS — F41.1 GENERALIZED ANXIETY DISORDER: Chronic | ICD-10-CM

## 2023-04-05 RX ORDER — DEXTROAMPHETAMINE SACCHARATE, AMPHETAMINE ASPARTATE MONOHYDRATE, DEXTROAMPHETAMINE SULFATE AND AMPHETAMINE SULFATE 6.25; 6.25; 6.25; 6.25 MG/1; MG/1; MG/1; MG/1
25 CAPSULE, EXTENDED RELEASE ORAL EVERY MORNING
Qty: 30 CAPSULE | Refills: 0 | Status: SHIPPED | OUTPATIENT
Start: 2023-04-05

## 2023-04-05 RX ORDER — CLONIDINE HYDROCHLORIDE 0.1 MG/1
0.1 TABLET ORAL NIGHTLY
Qty: 90 TABLET | Refills: 0 | Status: SHIPPED | OUTPATIENT
Start: 2023-04-05

## 2023-04-12 RX ORDER — NORETHINDRONE ACETATE AND ETHINYL ESTRADIOL 1.5; .03 MG/1; MG/1
TABLET ORAL
Qty: 63 TABLET | Refills: 1 | Status: SHIPPED | OUTPATIENT
Start: 2023-04-12

## 2023-04-14 ENCOUNTER — TELEPHONE (OUTPATIENT)
Dept: PSYCHIATRY | Facility: CLINIC | Age: 28
End: 2023-04-14
Payer: COMMERCIAL

## 2023-04-27 DIAGNOSIS — F33.0 MILD EPISODE OF RECURRENT MAJOR DEPRESSIVE DISORDER: Chronic | ICD-10-CM

## 2023-04-27 DIAGNOSIS — F41.1 GENERALIZED ANXIETY DISORDER: Chronic | ICD-10-CM

## 2023-04-27 RX ORDER — ESCITALOPRAM OXALATE 20 MG/1
20 TABLET ORAL EVERY MORNING
Qty: 90 TABLET | Refills: 0 | Status: SHIPPED | OUTPATIENT
Start: 2023-04-27

## 2023-05-08 DIAGNOSIS — F90.8 ADHD, ADULT RESIDUAL TYPE: Chronic | ICD-10-CM

## 2023-05-09 RX ORDER — DEXTROAMPHETAMINE SACCHARATE, AMPHETAMINE ASPARTATE MONOHYDRATE, DEXTROAMPHETAMINE SULFATE AND AMPHETAMINE SULFATE 6.25; 6.25; 6.25; 6.25 MG/1; MG/1; MG/1; MG/1
25 CAPSULE, EXTENDED RELEASE ORAL EVERY MORNING
Qty: 30 CAPSULE | Refills: 0 | Status: SHIPPED | OUTPATIENT
Start: 2023-05-09

## 2023-06-07 ENCOUNTER — OFFICE VISIT (OUTPATIENT)
Dept: INTERNAL MEDICINE | Facility: CLINIC | Age: 28
End: 2023-06-07
Payer: COMMERCIAL

## 2023-06-07 VITALS
HEIGHT: 69 IN | DIASTOLIC BLOOD PRESSURE: 72 MMHG | WEIGHT: 224 LBS | BODY MASS INDEX: 33.18 KG/M2 | TEMPERATURE: 98.4 F | SYSTOLIC BLOOD PRESSURE: 114 MMHG | HEART RATE: 92 BPM

## 2023-06-07 DIAGNOSIS — F41.1 GENERALIZED ANXIETY DISORDER: Chronic | ICD-10-CM

## 2023-06-07 DIAGNOSIS — I10 BENIGN ESSENTIAL HYPERTENSION: ICD-10-CM

## 2023-06-07 DIAGNOSIS — R79.89 ELEVATED LIVER FUNCTION TESTS: ICD-10-CM

## 2023-06-07 DIAGNOSIS — Z79.899 HIGH RISK MEDICATION USE: ICD-10-CM

## 2023-06-07 DIAGNOSIS — F33.0 MILD EPISODE OF RECURRENT MAJOR DEPRESSIVE DISORDER: Chronic | ICD-10-CM

## 2023-06-07 DIAGNOSIS — E55.9 VITAMIN D DEFICIENCY: ICD-10-CM

## 2023-06-07 DIAGNOSIS — E03.9 HYPOTHYROIDISM, UNSPECIFIED TYPE: ICD-10-CM

## 2023-06-07 DIAGNOSIS — E11.9 TYPE 2 DIABETES MELLITUS WITHOUT COMPLICATION, WITHOUT LONG-TERM CURRENT USE OF INSULIN: Primary | ICD-10-CM

## 2023-06-07 DIAGNOSIS — F90.8 ADHD, ADULT RESIDUAL TYPE: Chronic | ICD-10-CM

## 2023-06-07 LAB
EXPIRATION DATE: ABNORMAL
EXPIRATION DATE: NORMAL
HBA1C MFR BLD: 6.6 %
Lab: ABNORMAL
Lab: NORMAL
POC CREATININE URINE: 300
POC MICROALBUMIN URINE: 10

## 2023-06-07 RX ORDER — LEVOTHYROXINE SODIUM 0.07 MG/1
75 TABLET ORAL DAILY
Qty: 90 TABLET | Refills: 1 | Status: SHIPPED | OUTPATIENT
Start: 2023-06-07

## 2023-06-07 RX ORDER — SEMAGLUTIDE 1.34 MG/ML
0.25 INJECTION, SOLUTION SUBCUTANEOUS WEEKLY
Qty: 3 ML | Refills: 0 | Status: SHIPPED | OUTPATIENT
Start: 2023-06-07

## 2023-06-07 RX ORDER — ESCITALOPRAM OXALATE 20 MG/1
20 TABLET ORAL EVERY MORNING
Qty: 90 TABLET | Refills: 1 | Status: SHIPPED | OUTPATIENT
Start: 2023-06-07

## 2023-06-07 RX ORDER — CLONIDINE HYDROCHLORIDE 0.1 MG/1
0.1 TABLET ORAL NIGHTLY
Qty: 90 TABLET | Refills: 0 | Status: SHIPPED | OUTPATIENT
Start: 2023-06-07

## 2023-06-07 RX ORDER — DEXTROAMPHETAMINE SACCHARATE, AMPHETAMINE ASPARTATE MONOHYDRATE, DEXTROAMPHETAMINE SULFATE AND AMPHETAMINE SULFATE 6.25; 6.25; 6.25; 6.25 MG/1; MG/1; MG/1; MG/1
25 CAPSULE, EXTENDED RELEASE ORAL EVERY MORNING
Qty: 30 CAPSULE | Refills: 0 | Status: SHIPPED | OUTPATIENT
Start: 2023-06-07

## 2023-06-07 RX ORDER — HYDROCHLOROTHIAZIDE 25 MG/1
25 TABLET ORAL DAILY
Qty: 90 TABLET | Refills: 1 | Status: SHIPPED | OUTPATIENT
Start: 2023-06-07

## 2023-06-07 NOTE — PROGRESS NOTES
Isabell Oakes  1995  [id number]  Patient Care Team:  Yamilex Khan APRN as PCP - General (Internal Medicine)  Darling Watson APRN as Nurse Practitioner (Obstetrics and Gynecology)  Sherri Zuñiga APRN as Nurse Practitioner (Nurse Practitioner)  Lisa Treviño APRN as Nurse Practitioner (Family Medicine)    Isabell Oakes is a pleasant 27 y.o. female who presents for evaluation of ADD and Diabetes    Chief Complaint   Patient presents with    ADD    Diabetes       HPI:   Juanjo has a pertinent medical history including diabetes, hypothyroidism, hypertension, hyperlipidemia, morbid obesity, and psoriasis. She is working full time at [employer] after resigning from full time job as a .  She sees mental health provider at [facility]. She has not seen an RD regularly for about a year. She is here for a routine 3-month follow-up.     The patient is doing well. She has been taking Adderall for approximately 1 year. Her psychiatrist was telehealth only so she can no longer obtain her prescriptions there. We will manage this for now. The patient takes clonidine and Lexapro at night to sleep. She is doing well on these medications.    The patient requests a refill levothyroxine 75 mcg.    DM:  A1C has been controlled this year but is up to 6.6% from 6.0%. She is compliant with metformin 500mg daily. The patient has not seen her dietician, Idalia Katz, in 1 year. She does not weigh herself because of her ED history. She has gained 9 pounds since her last visit, and she does not feel like that is accurate. She is still exercising 3 to 4 times weekly. She does not eat an unhealthy diet. She will try to maintain proper hydration. Her sleep has improved. She notes that she has been the most consistent she has been since high school. She is concerned with her hemoglobin A1c going up as she is trying to maintain a healthy diet. She admits that she may be eating more  treats. She struggles with feeling satisfied because she wakes up hungry frequently. She eats a balanced meal and then she is hungry again. She does not drink alcohol or smoke.    Past Medical History:   Diagnosis Date    ADHD (attention deficit hyperactivity disorder)     Anxiety     Bipolar disorder     Depression     Diabetes mellitus type II, controlled     Disease of thyroid gland     Hypertension      Past Surgical History:   Procedure Laterality Date    TONSILLECTOMY  2001     Family History   Problem Relation Age of Onset    Diabetes Mother     Hypertension Mother     Hypertension Father     Bipolar disorder Father     Depression Father     Arthritis Maternal Grandmother     Depression Maternal Grandmother     Hypertension Maternal Grandmother     Hyperlipidemia Maternal Grandmother     Cancer Maternal Grandfather         melanoma?    Arthritis Paternal Grandmother     Depression Paternal Grandmother     Hypertension Paternal Grandmother     Cancer Paternal Grandfather         lung (smoker)    Heart disease Paternal Grandfather      Social History     Tobacco Use   Smoking Status Never   Smokeless Tobacco Never     No Known Allergies    Current Outpatient Medications:     cloNIDine (CATAPRES) 0.1 MG tablet, Take 1 tablet by mouth Every Night., Disp: 90 tablet, Rfl: 0    escitalopram (LEXAPRO) 20 MG tablet, Take 1 tablet by mouth Every Morning., Disp: 90 tablet, Rfl: 1    Tessa 1.5/30 1.5-30 MG-MCG tablet, TAKE ONE TABLET BY MOUTH DAILY, Disp: 63 tablet, Rfl: 1    hydroCHLOROthiazide (HYDRODIURIL) 25 MG tablet, Take 1 tablet by mouth Daily., Disp: 90 tablet, Rfl: 1    levothyroxine (SYNTHROID, LEVOTHROID) 75 MCG tablet, Take 1 tablet by mouth Daily., Disp: 90 tablet, Rfl: 1    metFORMIN (GLUCOPHAGE) 500 MG tablet, TAKE ONE TABLET BY MOUTH DAILY, Disp: 90 tablet, Rfl: 1    Tremfya 100 MG/ML solution pen-injector, Every 3 (Three) Months., Disp: , Rfl:     amphetamine-dextroamphetamine XR (ADDERALL XR) 25 MG 24  "hr capsule, Take 1 capsule by mouth Every Morning, Disp: 30 capsule, Rfl: 0    Insulin Pen Needle 30G X 8 MM misc, 1 Device 1 (One) Time Per Week., Disp: 90 each, Rfl: 0    Semaglutide,0.25 or 0.5MG/DOS, (Ozempic, 0.25 or 0.5 MG/DOSE,) 2 MG/1.5ML solution pen-injector, Inject 0.25 mg under the skin into the appropriate area as directed 1 (One) Time Per Week., Disp: 3 mL, Rfl: 0    Review of Systems  Review of systems was completed, and pertinent findings are noted in the HPI.  /72 (BP Location: Left arm, Patient Position: Sitting, Cuff Size: Adult)   Pulse 92   Temp 98.4 °F (36.9 °C) (Temporal)   Ht 174 cm (68.5\")   Wt 102 kg (224 lb)   BMI 33.56 kg/m²     Physical Exam  Constitutional:       Appearance: She is well-developed. She is obese.   HENT:      Head: Normocephalic and atraumatic.   Eyes:      Conjunctiva/sclera: Conjunctivae normal.      Pupils: Pupils are equal, round, and reactive to light.   Cardiovascular:      Rate and Rhythm: Normal rate and regular rhythm.      Pulses: Normal pulses.      Heart sounds: Normal heart sounds.   Pulmonary:      Effort: Pulmonary effort is normal.      Breath sounds: Normal breath sounds.   Musculoskeletal:         General: Normal range of motion.      Cervical back: Normal range of motion and neck supple.      Right lower leg: No edema.      Left lower leg: No edema.   Skin:     General: Skin is warm and dry.   Neurological:      Mental Status: She is alert and oriented to person, place, and time.   Psychiatric:         Mood and Affect: Mood normal.         Behavior: Behavior normal.         Thought Content: Thought content normal.         Judgment: Judgment normal.       PHQ-9 Total Score:      Assessment/Plan:  Diagnoses and all orders for this visit:    1. Type 2 diabetes mellitus without complication, without long-term current use of insulin (Primary)  -     Lipid Panel; Future  -     TSH Rfx On Abnormal To Free T4; Future  -     POC Glycosylated " Hemoglobin (Hb A1C)  -     Insulin Pen Needle 30G X 8 MM misc; 1 Device 1 (One) Time Per Week.  Dispense: 90 each; Refill: 0  -     Semaglutide,0.25 or 0.5MG/DOS, (Ozempic, 0.25 or 0.5 MG/DOSE,) 2 MG/1.5ML solution pen-injector; Inject 0.25 mg under the skin into the appropriate area as directed 1 (One) Time Per Week.  Dispense: 3 mL; Refill: 0    2. Benign essential hypertension  -     CBC & Differential; Future  -     Comprehensive Metabolic Panel; Future  -     Cancel: Microalbumin / Creatinine Urine Ratio - Urine, Clean Catch; Future  -     POC Microalbumin  -     hydroCHLOROthiazide (HYDRODIURIL) 25 MG tablet; Take 1 tablet by mouth Daily.  Dispense: 90 tablet; Refill: 1    3. Vitamin D deficiency  -     Vitamin D,25-Hydroxy; Future    4. Elevated liver function tests    5. Generalized anxiety disorder  -     cloNIDine (CATAPRES) 0.1 MG tablet; Take 1 tablet by mouth Every Night.  Dispense: 90 tablet; Refill: 0  -     escitalopram (LEXAPRO) 20 MG tablet; Take 1 tablet by mouth Every Morning.  Dispense: 90 tablet; Refill: 1    6. ADHD, adult residual type  -     cloNIDine (CATAPRES) 0.1 MG tablet; Take 1 tablet by mouth Every Night.  Dispense: 90 tablet; Refill: 0    7. Mild episode of recurrent major depressive disorder  -     escitalopram (LEXAPRO) 20 MG tablet; Take 1 tablet by mouth Every Morning.  Dispense: 90 tablet; Refill: 1    8. High risk medication use  -     Compliance Drug Analysis, Ur - Urine, Clean Catch; Future    9. Hypothyroidism, unspecified type  -     levothyroxine (SYNTHROID, LEVOTHROID) 75 MCG tablet; Take 1 tablet by mouth Daily.  Dispense: 90 tablet; Refill: 1       There are no Patient Instructions on file for this visit.    1. Attention deficit hyperactivity disorder, adult residual type  Comments:  - The patient will continue taking Adderall as prescribed.    2. Hypothyroidism  Comments:  - The patient will continue taking levothyroxine 75 mcg as prescribed.  - Blood work has been  ordered.    3. Type 2 diabetes mellitus without complication, without long-term current use of insulin (HCC)  Comments:  - The patient will continue taking metformin as prescribed.  - The patient will start Ozempic 0.25 mg once weekly.    4. Essential hypertension  Comments:  - The patient will continue taking hydrochlorothiazide as prescribed.  - Blood work has been ordered.    5. Generalized anxiety disorder  Comments:  - Continue clonidine and Lexapro as prescribed.    Plan of care reviewed with patient at the conclusion of today's visit. Education was provided regarding diagnosis, management and any prescribed or recommended OTC medications.  Patient verbalizes understanding of and agreement with management plan.    Return in about 3 months (around 9/7/2023) for Next scheduled follow up.    Dictated Utilizing Dragon Dictation.    VIVIANE Rowan      Transcribed from ambient dictation for VIVIANE Rowan by Jie El.  06/07/23   19:04 EDT    Patient or patient representative verbalized consent to the visit recording.  I have personally performed the services described in this document as transcribed by the above individual, and it is both accurate and complete.

## 2023-06-14 LAB — DRUGS UR: NORMAL

## 2023-07-26 ENCOUNTER — PATIENT MESSAGE (OUTPATIENT)
Dept: INTERNAL MEDICINE | Facility: CLINIC | Age: 28
End: 2023-07-26
Payer: COMMERCIAL

## 2023-07-26 NOTE — TELEPHONE ENCOUNTER
From: Isabell Oakes  To: Yamilex Khan  Sent: 7/26/2023 3:48 PM EDT  Subject: Labs    I completely forgot to go get labs. My dermatologist called the same ones in and I am currently getting them done at [facility]. I can send them over or how can you get access to them?

## 2023-07-27 DIAGNOSIS — E11.9 DIABETES MELLITUS, NEW ONSET: ICD-10-CM

## 2023-08-10 DIAGNOSIS — E11.9 TYPE 2 DIABETES MELLITUS WITHOUT COMPLICATION, WITHOUT LONG-TERM CURRENT USE OF INSULIN: ICD-10-CM

## 2023-08-10 RX ORDER — SEMAGLUTIDE 0.68 MG/ML
INJECTION, SOLUTION SUBCUTANEOUS
Qty: 3 ML | Refills: 1 | Status: SHIPPED | OUTPATIENT
Start: 2023-08-10 | End: 2023-08-11

## 2023-08-11 RX ORDER — SEMAGLUTIDE 1.34 MG/ML
1 INJECTION, SOLUTION SUBCUTANEOUS WEEKLY
Qty: 3 ML | Refills: 0 | Status: SHIPPED | OUTPATIENT
Start: 2023-08-11

## 2023-08-21 RX ORDER — NORETHINDRONE ACETATE AND ETHINYL ESTRADIOL 1.5; .03 MG/1; MG/1
TABLET ORAL
Qty: 63 TABLET | Refills: 1 | Status: SHIPPED | OUTPATIENT
Start: 2023-08-21

## 2023-08-29 DIAGNOSIS — F90.8 ADHD, ADULT RESIDUAL TYPE: Chronic | ICD-10-CM

## 2023-08-30 RX ORDER — DEXTROAMPHETAMINE SACCHARATE, AMPHETAMINE ASPARTATE MONOHYDRATE, DEXTROAMPHETAMINE SULFATE AND AMPHETAMINE SULFATE 6.25; 6.25; 6.25; 6.25 MG/1; MG/1; MG/1; MG/1
25 CAPSULE, EXTENDED RELEASE ORAL EVERY MORNING
Qty: 30 CAPSULE | Refills: 0 | Status: SHIPPED | OUTPATIENT
Start: 2023-08-30

## 2023-09-06 ENCOUNTER — OFFICE VISIT (OUTPATIENT)
Dept: INTERNAL MEDICINE | Facility: CLINIC | Age: 28
End: 2023-09-06
Payer: COMMERCIAL

## 2023-09-06 ENCOUNTER — LAB (OUTPATIENT)
Dept: LAB | Facility: HOSPITAL | Age: 28
End: 2023-09-06
Payer: COMMERCIAL

## 2023-09-06 VITALS
SYSTOLIC BLOOD PRESSURE: 104 MMHG | DIASTOLIC BLOOD PRESSURE: 62 MMHG | HEIGHT: 69 IN | BODY MASS INDEX: 30.07 KG/M2 | TEMPERATURE: 98.2 F | HEART RATE: 104 BPM | WEIGHT: 203 LBS

## 2023-09-06 DIAGNOSIS — E11.9 TYPE 2 DIABETES MELLITUS WITHOUT COMPLICATION, WITHOUT LONG-TERM CURRENT USE OF INSULIN: ICD-10-CM

## 2023-09-06 DIAGNOSIS — E11.9 TYPE 2 DIABETES MELLITUS WITHOUT COMPLICATION, WITHOUT LONG-TERM CURRENT USE OF INSULIN: Primary | ICD-10-CM

## 2023-09-06 DIAGNOSIS — F98.8 ATTENTION DEFICIT DISORDER, UNSPECIFIED HYPERACTIVITY PRESENCE: ICD-10-CM

## 2023-09-06 DIAGNOSIS — E66.9 OBESITY (BMI 30.0-34.9): ICD-10-CM

## 2023-09-06 DIAGNOSIS — E55.9 VITAMIN D DEFICIENCY: ICD-10-CM

## 2023-09-06 DIAGNOSIS — I10 BENIGN ESSENTIAL HYPERTENSION: ICD-10-CM

## 2023-09-06 LAB
EXPIRATION DATE: NORMAL
HBA1C MFR BLD: 5.7 %
Lab: NORMAL

## 2023-09-06 RX ORDER — TRETINOIN 0.5 MG/G
1 CREAM TOPICAL 3 TIMES WEEKLY
COMMUNITY
Start: 2023-07-28

## 2023-09-06 RX ORDER — SEMAGLUTIDE 1.34 MG/ML
1 INJECTION, SOLUTION SUBCUTANEOUS WEEKLY
Qty: 3 ML | Refills: 2 | Status: SHIPPED | OUTPATIENT
Start: 2023-09-06

## 2023-09-06 NOTE — PROGRESS NOTES
"Isabell Oakes  1995  [id number]  Patient Care Team:  Yamilex Khan APRN as PCP - General (Internal Medicine)  Darling Watson APRN as Nurse Practitioner (Obstetrics and Gynecology)  Sherri Zuñiga APRN as Nurse Practitioner (Nurse Practitioner)  Lisa Treviño APRN as Nurse Practitioner (Family Medicine)    Isabell Oakes is a pleasant 27 y.o. female who presents for evaluation of Diabetes, Hypertension, and ADD    Chief Complaint   Patient presents with    Diabetes    Hypertension    ADD       HPI:   Isabell \"Leonardo Oakes, date of birth 1995, is a 27-year-old female who presents for follow up of attention deficit disorder and diabetes. Juanjo has a pertinent medical history including diabetes, hypothyroidism, hypertension, hyperlipidemia, morbid obesity, and psoriasis. She is working full time at [employer] after resigning from full time job as a .  She sees mental health provider at [facility]. She has not seen an RD regularly for about a year. She is here for a routine 3-month follow-up.      The patient's A1c is 5.7 percent, which decreased from 6.6 percent 3 months ago. She has been checking her blood glucose levels at home. She feels like she has eaten half her usual amount of food in 3 months. Last week, she was very hungry, but was unable to eat because she was nauseous. She has been trying to drink protein yogurts and eating as much as she is able. She has not been exercising as much for the last 2 weeks due to her busy work schedule. She still exercises 2 times a week. She has also been experiencing a lot of headaches lately which she believes is due to not eating enough food. She consciously drinks more water. She complains of constipation sometimes and feels she needs to take fiber.  She has lost 21 pounds in the past 3 months.    She admits she has not been under 200 pounds in years. She adds she is nauseous the first few days of " taking Ozempic but nausea resolves after 1 to 2 days by the end of the week., and her hunger increases by the end of the week.    She admits her Adderall and Lexapro 20 mg dose have been working well for her. She takes Motrin 1 to 2 times per week. She has been taking hydrochlorothiazide 25 mg daily and clonidine at night. She has not been checking her blood pressure at home as much as she checks her blood sugar levels.  She is having some dizziness and more fatigue.      She had a normal eye exam in July.      Past Medical History:   Diagnosis Date    ADHD (attention deficit hyperactivity disorder)     Anxiety     Bipolar disorder     Depression     Diabetes mellitus type II, controlled     Disease of thyroid gland     Hypertension      Past Surgical History:   Procedure Laterality Date    TONSILLECTOMY  2001     Family History   Problem Relation Age of Onset    Diabetes Mother     Hypertension Mother     Hypertension Father     Bipolar disorder Father     Depression Father     Arthritis Maternal Grandmother     Depression Maternal Grandmother     Hypertension Maternal Grandmother     Hyperlipidemia Maternal Grandmother     Cancer Maternal Grandfather         melanoma?    Arthritis Paternal Grandmother     Depression Paternal Grandmother     Hypertension Paternal Grandmother     Cancer Paternal Grandfather         lung (smoker)    Heart disease Paternal Grandfather      Social History     Tobacco Use   Smoking Status Never   Smokeless Tobacco Never     No Known Allergies    Current Outpatient Medications:     amphetamine-dextroamphetamine XR (ADDERALL XR) 25 MG 24 hr capsule, Take 1 capsule by mouth Every Morning, Disp: 30 capsule, Rfl: 0    cloNIDine (CATAPRES) 0.1 MG tablet, Take 1 tablet by mouth Every Night., Disp: 90 tablet, Rfl: 0    escitalopram (LEXAPRO) 20 MG tablet, Take 1 tablet by mouth Every Morning., Disp: 90 tablet, Rfl: 1    Tessa 1.5/30 1.5-30 MG-MCG tablet, TAKE ONE TABLET BY MOUTH DAILY, Disp:  "63 tablet, Rfl: 1    Insulin Pen Needle 30G X 8 MM misc, 1 Device 1 (One) Time Per Week., Disp: 90 each, Rfl: 0    levothyroxine (SYNTHROID, LEVOTHROID) 75 MCG tablet, Take 1 tablet by mouth Daily., Disp: 90 tablet, Rfl: 1    metFORMIN (GLUCOPHAGE) 500 MG tablet, TAKE ONE TABLET BY MOUTH DAILY, Disp: 90 tablet, Rfl: 1    Semaglutide, 1 MG/DOSE, (Ozempic, 1 MG/DOSE,) 4 MG/3ML solution pen-injector, Inject 1 mg under the skin into the appropriate area as directed 1 (One) Time Per Week., Disp: 3 mL, Rfl: 2    Tremfya 100 MG/ML solution pen-injector, Every 3 (Three) Months., Disp: , Rfl:     tretinoin (RETIN-A) 0.05 % cream, Apply 1 application  topically to the appropriate area as directed 3 (Three) Times a Week., Disp: , Rfl:     Review of Systems  Review of systems was completed, and pertinent findings are noted in the HPI.  /62 (BP Location: Left arm, Patient Position: Sitting, Cuff Size: Adult)   Pulse 104   Temp 98.2 °F (36.8 °C) (Temporal)   Ht 174 cm (68.5\")   Wt 92.1 kg (203 lb)   BMI 30.41 kg/m²     Physical Exam  Constitutional:       Appearance: She is well-developed. She is obese.   HENT:      Head: Normocephalic and atraumatic.   Eyes:      Conjunctiva/sclera: Conjunctivae normal.      Pupils: Pupils are equal, round, and reactive to light.   Cardiovascular:      Rate and Rhythm: Normal rate and regular rhythm.      Pulses: Normal pulses.      Heart sounds: Normal heart sounds.   Pulmonary:      Effort: Pulmonary effort is normal.      Breath sounds: Normal breath sounds.   Musculoskeletal:         General: Normal range of motion.      Cervical back: Normal range of motion and neck supple.      Right lower leg: No edema.      Left lower leg: No edema.   Skin:     General: Skin is warm and dry.   Neurological:      Mental Status: She is alert and oriented to person, place, and time.   Psychiatric:         Mood and Affect: Mood normal.         Behavior: Behavior normal.         Thought Content: " Thought content normal.         Judgment: Judgment normal.     Her blood pressure is 104/62 mmHg today.       PHQ-9 Total Score:      Assessment/Plan:  Diagnoses and all orders for this visit:    1. Type 2 diabetes mellitus without complication, without long-term current use of insulin (Primary)  -     POC Glycosylated Hemoglobin (Hb A1C)    2. Benign essential hypertension    3. Attention deficit disorder, unspecified hyperactivity presence    4. Obesity (BMI 30.0-34.9)    Other orders  -     Semaglutide, 1 MG/DOSE, (Ozempic, 1 MG/DOSE,) 4 MG/3ML solution pen-injector; Inject 1 mg under the skin into the appropriate area as directed 1 (One) Time Per Week.  Dispense: 3 mL; Refill: 2     Type 2 diabetes mellitus without complication, without long-term current use of insulin  - Her hemoglobin A1c is 5.7 percent. She, Ozempic 1 mg weekly for now will continue her current medication regimen.    Attention deficit disorder  - She will continue taking Adderall.    Hypertension, unspecified type  - She will discontinue hydrochlorothiazide and monitor her blood pressure at home closely. If she begins to see a spike and increase, more than 140/80, she will commence it at 12.5 mg.      Health maintenance  - She will have her routine lab work done today.     She will return for follow up in 3 months.     Patient Instructions   This patient is on a controlled substance which improves symptoms/quality of life and is aware of the risks, benefits and possible side-effects current treatment. The patient denies any medication side-effects at this time. A controlled substance agreement will be obtained or is currently on file. We reviewed required monitoring for controlled substances including but not limited to quarterly follow-up visits, annual depression screening, and urine drug screens to which the patient is agreeable. A [facility] report has been or shortly will be reviewed. There are no signs of deviation or misuse.     Plan of  care reviewed with patient at the conclusion of today's visit. Education was provided regarding diagnosis, management and any prescribed or recommended OTC medications.  Patient verbalizes understanding of and agreement with management plan.    Return in about 3 months (around 12/6/2023) for Annual physical, Labs this visit.    Dictated Utilizing Dragon Dictation.    VIVIANE Rowan      Transcribed from ambient dictation for VIVIANE Rowan by Chloe Orellana.  09/06/23   19:17 EDT    Patient or patient representative verbalized consent to the visit recording.  I have personally performed the services described in this document as transcribed by the above individual, and it is both accurate and complete.

## 2023-09-06 NOTE — PATIENT INSTRUCTIONS
This patient is on a controlled substance which improves symptoms/quality of life and is aware of the risks, benefits and possible side-effects current treatment. The patient denies any medication side-effects at this time. A controlled substance agreement will be obtained or is currently on file. We reviewed required monitoring for controlled substances including but not limited to quarterly follow-up visits, annual depression screening, and urine drug screens to which the patient is agreeable. A [facility] report has been or shortly will be reviewed. There are no signs of deviation or misuse.

## 2023-09-07 LAB
25(OH)D3+25(OH)D2 SERPL-MCNC: 35.8 NG/ML (ref 30–100)
ALBUMIN SERPL-MCNC: 4.7 G/DL (ref 3.5–5.2)
ALBUMIN/GLOB SERPL: 1.8 G/DL
ALP SERPL-CCNC: 83 U/L (ref 39–117)
ALT SERPL-CCNC: 75 U/L (ref 1–33)
AST SERPL-CCNC: 32 U/L (ref 1–32)
BASOPHILS # BLD AUTO: 0.07 10*3/MM3 (ref 0–0.2)
BASOPHILS NFR BLD AUTO: 0.6 % (ref 0–1.5)
BILIRUB SERPL-MCNC: 0.6 MG/DL (ref 0–1.2)
BUN SERPL-MCNC: 8 MG/DL (ref 6–20)
BUN/CREAT SERPL: 10.7 (ref 7–25)
CALCIUM SERPL-MCNC: 10.3 MG/DL (ref 8.6–10.5)
CHLORIDE SERPL-SCNC: 101 MMOL/L (ref 98–107)
CHOLEST SERPL-MCNC: 205 MG/DL (ref 0–200)
CO2 SERPL-SCNC: 26.1 MMOL/L (ref 22–29)
CREAT SERPL-MCNC: 0.75 MG/DL (ref 0.57–1)
EGFRCR SERPLBLD CKD-EPI 2021: 112.8 ML/MIN/1.73
EOSINOPHIL # BLD AUTO: 0.11 10*3/MM3 (ref 0–0.4)
EOSINOPHIL NFR BLD AUTO: 0.9 % (ref 0.3–6.2)
ERYTHROCYTE [DISTWIDTH] IN BLOOD BY AUTOMATED COUNT: 13 % (ref 12.3–15.4)
GLOBULIN SER CALC-MCNC: 2.6 GM/DL
GLUCOSE SERPL-MCNC: 88 MG/DL (ref 65–99)
HCT VFR BLD AUTO: 41.2 % (ref 34–46.6)
HDLC SERPL-MCNC: 44 MG/DL (ref 40–60)
HGB BLD-MCNC: 14 G/DL (ref 12–15.9)
IMM GRANULOCYTES # BLD AUTO: 0.03 10*3/MM3 (ref 0–0.05)
IMM GRANULOCYTES NFR BLD AUTO: 0.3 % (ref 0–0.5)
LDLC SERPL CALC-MCNC: 146 MG/DL (ref 0–100)
LYMPHOCYTES # BLD AUTO: 3.96 10*3/MM3 (ref 0.7–3.1)
LYMPHOCYTES NFR BLD AUTO: 33.5 % (ref 19.6–45.3)
MCH RBC QN AUTO: 28.1 PG (ref 26.6–33)
MCHC RBC AUTO-ENTMCNC: 34 G/DL (ref 31.5–35.7)
MCV RBC AUTO: 82.7 FL (ref 79–97)
MONOCYTES # BLD AUTO: 0.58 10*3/MM3 (ref 0.1–0.9)
MONOCYTES NFR BLD AUTO: 4.9 % (ref 5–12)
NEUTROPHILS # BLD AUTO: 7.07 10*3/MM3 (ref 1.7–7)
NEUTROPHILS NFR BLD AUTO: 59.8 % (ref 42.7–76)
NRBC BLD AUTO-RTO: 0 /100 WBC (ref 0–0.2)
PLATELET # BLD AUTO: 365 10*3/MM3 (ref 140–450)
POTASSIUM SERPL-SCNC: 3.9 MMOL/L (ref 3.5–5.2)
PROT SERPL-MCNC: 7.3 G/DL (ref 6–8.5)
RBC # BLD AUTO: 4.98 10*6/MM3 (ref 3.77–5.28)
SODIUM SERPL-SCNC: 139 MMOL/L (ref 136–145)
TRIGL SERPL-MCNC: 84 MG/DL (ref 0–150)
TSH SERPL DL<=0.005 MIU/L-ACNC: 2.03 UIU/ML (ref 0.27–4.2)
VLDLC SERPL CALC-MCNC: 15 MG/DL (ref 5–40)
WBC # BLD AUTO: 11.82 10*3/MM3 (ref 3.4–10.8)

## 2023-09-11 RX ORDER — SEMAGLUTIDE 1.34 MG/ML
INJECTION, SOLUTION SUBCUTANEOUS
Qty: 3 ML | Refills: 2 | OUTPATIENT
Start: 2023-09-11

## 2023-09-29 ENCOUNTER — PATIENT MESSAGE (OUTPATIENT)
Dept: INTERNAL MEDICINE | Facility: CLINIC | Age: 28
End: 2023-09-29
Payer: COMMERCIAL

## 2023-09-29 DIAGNOSIS — F90.8 ADHD, ADULT RESIDUAL TYPE: Chronic | ICD-10-CM

## 2023-09-29 RX ORDER — DEXTROAMPHETAMINE SACCHARATE, AMPHETAMINE ASPARTATE MONOHYDRATE, DEXTROAMPHETAMINE SULFATE AND AMPHETAMINE SULFATE 6.25; 6.25; 6.25; 6.25 MG/1; MG/1; MG/1; MG/1
25 CAPSULE, EXTENDED RELEASE ORAL EVERY MORNING
Qty: 30 CAPSULE | Refills: 0 | Status: SHIPPED | OUTPATIENT
Start: 2023-09-29

## 2023-09-29 NOTE — TELEPHONE ENCOUNTER
Rx Refill Note  Requested Prescriptions     Pending Prescriptions Disp Refills    amphetamine-dextroamphetamine XR (ADDERALL XR) 25 MG 24 hr capsule 30 capsule 0     Sig: Take 1 capsule by mouth Every Morning      Last office visit with prescribing clinician: 9/6/2023   Next office visit with prescribing clinician: 12/20/2023     LA: 08/30/23 #30 0R                          Would you like a call back once the refill request has been completed: [] Yes [] No    If the office needs to give you a call back, can they leave a voicemail: [] Yes [] No    Hope Newell LPN  09/29/23, 10:53 EDT

## 2023-09-29 NOTE — TELEPHONE ENCOUNTER
From: Isabell Oakes  To: Yamilex Khan  Sent: 9/29/2023 9:41 AM EDT  Subject: Kashif Kaminski! I have 2 days left of my adderall. I talked to the [facility] pharmacy at [facility] on [facility] and they have the generic XR 25 mg available. Their number is [phone] if you could get that called in! Thank you so much!

## 2023-10-30 DIAGNOSIS — F90.8 ADHD, ADULT RESIDUAL TYPE: Chronic | ICD-10-CM

## 2023-10-31 RX ORDER — DEXTROAMPHETAMINE SACCHARATE, AMPHETAMINE ASPARTATE MONOHYDRATE, DEXTROAMPHETAMINE SULFATE AND AMPHETAMINE SULFATE 6.25; 6.25; 6.25; 6.25 MG/1; MG/1; MG/1; MG/1
25 CAPSULE, EXTENDED RELEASE ORAL EVERY MORNING
Qty: 30 CAPSULE | Refills: 0 | Status: SHIPPED | OUTPATIENT
Start: 2023-10-31

## 2023-11-10 DIAGNOSIS — F41.1 GENERALIZED ANXIETY DISORDER: Chronic | ICD-10-CM

## 2023-11-10 DIAGNOSIS — F90.8 ADHD, ADULT RESIDUAL TYPE: Chronic | ICD-10-CM

## 2023-11-10 RX ORDER — CLONIDINE HYDROCHLORIDE 0.1 MG/1
0.1 TABLET ORAL NIGHTLY
Qty: 90 TABLET | Refills: 1 | Status: SHIPPED | OUTPATIENT
Start: 2023-11-10

## 2023-11-19 ENCOUNTER — DOCUMENTATION (OUTPATIENT)
Dept: INTERNAL MEDICINE | Facility: CLINIC | Age: 28
End: 2023-11-19
Payer: COMMERCIAL

## 2023-11-19 RX ORDER — AZITHROMYCIN 250 MG/1
TABLET, FILM COATED ORAL
Qty: 6 TABLET | Refills: 0 | Status: SHIPPED | OUTPATIENT
Start: 2023-11-19

## 2023-11-28 DIAGNOSIS — F90.8 ADHD, ADULT RESIDUAL TYPE: Chronic | ICD-10-CM

## 2023-11-29 RX ORDER — DEXTROAMPHETAMINE SACCHARATE, AMPHETAMINE ASPARTATE MONOHYDRATE, DEXTROAMPHETAMINE SULFATE AND AMPHETAMINE SULFATE 6.25; 6.25; 6.25; 6.25 MG/1; MG/1; MG/1; MG/1
25 CAPSULE, EXTENDED RELEASE ORAL EVERY MORNING
Qty: 30 CAPSULE | Refills: 0 | Status: SHIPPED | OUTPATIENT
Start: 2023-11-29

## 2023-11-29 NOTE — TELEPHONE ENCOUNTER
Rx Refill Note  Requested Prescriptions     Pending Prescriptions Disp Refills    amphetamine-dextroamphetamine XR (ADDERALL XR) 25 MG 24 hr capsule 30 capsule 0     Sig: Take 1 capsule by mouth Every Morning      Last office visit with prescribing clinician: Visit date not found   Last telemedicine visit with prescribing clinician: 6/23/2023   Next office visit with prescribing clinician: Visit date not found                         Would you like a call back once the refill request has been completed: [] Yes [] No    If the office needs to give you a call back, can they leave a voicemail: [] Yes [] No    Radha Lopez LPN  11/29/23, 09:40 EST

## 2023-12-06 DIAGNOSIS — E03.9 HYPOTHYROIDISM, UNSPECIFIED TYPE: ICD-10-CM

## 2023-12-06 RX ORDER — LEVOTHYROXINE SODIUM 0.07 MG/1
75 TABLET ORAL DAILY
Qty: 90 TABLET | Refills: 1 | Status: SHIPPED | OUTPATIENT
Start: 2023-12-06

## 2023-12-20 ENCOUNTER — OFFICE VISIT (OUTPATIENT)
Dept: INTERNAL MEDICINE | Facility: CLINIC | Age: 28
End: 2023-12-20
Payer: COMMERCIAL

## 2023-12-20 VITALS
HEART RATE: 92 BPM | BODY MASS INDEX: 28.29 KG/M2 | TEMPERATURE: 97.7 F | WEIGHT: 191 LBS | SYSTOLIC BLOOD PRESSURE: 112 MMHG | HEIGHT: 69 IN | DIASTOLIC BLOOD PRESSURE: 68 MMHG

## 2023-12-20 DIAGNOSIS — F41.1 GENERALIZED ANXIETY DISORDER: Chronic | ICD-10-CM

## 2023-12-20 DIAGNOSIS — E11.9 TYPE 2 DIABETES MELLITUS WITHOUT COMPLICATION, WITHOUT LONG-TERM CURRENT USE OF INSULIN: Primary | ICD-10-CM

## 2023-12-20 DIAGNOSIS — F33.0 MILD EPISODE OF RECURRENT MAJOR DEPRESSIVE DISORDER: Chronic | ICD-10-CM

## 2023-12-20 DIAGNOSIS — E11.9 DIABETES MELLITUS, NEW ONSET: ICD-10-CM

## 2023-12-20 DIAGNOSIS — F33.9 EPISODE OF RECURRENT MAJOR DEPRESSIVE DISORDER, UNSPECIFIED DEPRESSION EPISODE SEVERITY: ICD-10-CM

## 2023-12-20 DIAGNOSIS — F90.2 ATTENTION DEFICIT HYPERACTIVITY DISORDER (ADHD), COMBINED TYPE: ICD-10-CM

## 2023-12-20 LAB
EXPIRATION DATE: NORMAL
HBA1C MFR BLD: 5.3 % (ref 4.5–5.7)
Lab: NORMAL

## 2023-12-20 RX ORDER — SEMAGLUTIDE 1.34 MG/ML
1 INJECTION, SOLUTION SUBCUTANEOUS WEEKLY
Qty: 3 ML | Refills: 2 | Status: SHIPPED | OUTPATIENT
Start: 2023-12-20

## 2023-12-20 RX ORDER — ESCITALOPRAM OXALATE 20 MG/1
20 TABLET ORAL EVERY MORNING
Qty: 90 TABLET | Refills: 1 | Status: SHIPPED | OUTPATIENT
Start: 2023-12-20

## 2023-12-20 NOTE — PROGRESS NOTES
Isabell Oakes  1995  [id number]  Patient Care Team:  Yamilex Khan APRN as PCP - General (Internal Medicine)  Darling Watson APRN as Nurse Practitioner (Obstetrics and Gynecology)  Sherri Zñuiga APRN as Nurse Practitioner (Nurse Practitioner)  Lisa Treviño APRN as Nurse Practitioner (Family Medicine)    Isabell Oakes is a pleasant 28 y.o. female who presents for evaluation of Diabetes, Hypertension, and ADD    Chief Complaint   Patient presents with    Diabetes    Hypertension    ADD       HPI:     The patient is a 26-year-old female who presents for a 3-month follow-up for refills on her stimulant and check on diabetes. Her hemoglobin A1c today looks good at 5.3 percent, and it was 5.7 percent 3 months ago.    The patient is doing well. She is currently taking Ozempic 1 mg. She has lost over 30 pounds since 06/2023. She is currently taking metformin daily.    The patient had a home sleep study through [facility], and it was inconclusive. She does not feel rested during the day. Her sleep schedule gets messed up because of her job. She does not feel like Adderall keeps her awake at night. She feels like she can take it and go take a nap at times. Both of her parents and grandparents have sleep apnea.  She is not interested in an in lab study at this time.  Past Medical History:   Diagnosis Date    ADHD (attention deficit hyperactivity disorder)     Anxiety     Bipolar disorder     Depression     Diabetes mellitus type II, controlled     Disease of thyroid gland     Hypertension     Hypothyroidism      Past Surgical History:   Procedure Laterality Date    TONSILLECTOMY  2001     Family History   Problem Relation Age of Onset    Diabetes Mother     Hypertension Mother     Hypertension Father     Bipolar disorder Father     Depression Father     Anxiety disorder Father     Arthritis Maternal Grandmother     Depression Maternal Grandmother     Hypertension Maternal  "Grandmother     Hyperlipidemia Maternal Grandmother     Cancer Maternal Grandfather         melanoma?    Arthritis Paternal Grandmother     Depression Paternal Grandmother     Hypertension Paternal Grandmother     Cancer Paternal Grandfather         lung (smoker)    Heart disease Paternal Grandfather      Social History     Tobacco Use   Smoking Status Never   Smokeless Tobacco Never     No Known Allergies    Current Outpatient Medications:     amphetamine-dextroamphetamine XR (ADDERALL XR) 25 MG 24 hr capsule, Take 1 capsule by mouth Every Morning, Disp: 30 capsule, Rfl: 0    cloNIDine (CATAPRES) 0.1 MG tablet, TAKE ONE TABLET BY MOUTH ONCE NIGHTLY, Disp: 90 tablet, Rfl: 1    escitalopram (LEXAPRO) 20 MG tablet, Take 1 tablet by mouth Every Morning., Disp: 90 tablet, Rfl: 1    Tessa 1.5/30 1.5-30 MG-MCG tablet, TAKE ONE TABLET BY MOUTH DAILY, Disp: 63 tablet, Rfl: 1    levothyroxine (SYNTHROID, LEVOTHROID) 75 MCG tablet, TAKE 1 TABLET BY MOUTH DAILY, Disp: 90 tablet, Rfl: 1    metFORMIN (GLUCOPHAGE) 500 MG tablet, Take 1 tablet by mouth Daily., Disp: 90 tablet, Rfl: 1    Semaglutide, 1 MG/DOSE, (Ozempic, 1 MG/DOSE,) 4 MG/3ML solution pen-injector, Inject 1 mg under the skin into the appropriate area as directed 1 (One) Time Per Week., Disp: 3 mL, Rfl: 2    Tremfya 100 MG/ML solution pen-injector, Every 3 (Three) Months., Disp: , Rfl:     tretinoin (RETIN-A) 0.05 % cream, Apply 1 application  topically to the appropriate area as directed 3 (Three) Times a Week., Disp: , Rfl:     Review of Systems  Review of systems was completed, and pertinent findings are noted in the HPI.  /68 (BP Location: Left arm, Patient Position: Sitting, Cuff Size: Adult)   Pulse 92   Temp 97.7 °F (36.5 °C) (Temporal)   Ht 174 cm (68.5\")   Wt 86.6 kg (191 lb)   BMI 28.62 kg/m²     Physical Exam  Constitutional:       Appearance: She is well-developed and overweight.   HENT:      Head: Normocephalic and atraumatic.   Eyes:      " Conjunctiva/sclera: Conjunctivae normal.      Pupils: Pupils are equal, round, and reactive to light.   Cardiovascular:      Rate and Rhythm: Normal rate and regular rhythm.      Pulses: Normal pulses.      Heart sounds: Normal heart sounds.   Pulmonary:      Effort: Pulmonary effort is normal.      Breath sounds: Normal breath sounds.   Musculoskeletal:         General: Normal range of motion.      Cervical back: Normal range of motion and neck supple.   Skin:     General: Skin is warm and dry.   Neurological:      Mental Status: She is alert and oriented to person, place, and time.   Psychiatric:         Mood and Affect: Mood normal.         Behavior: Behavior normal.         Thought Content: Thought content normal.         Judgment: Judgment normal.         PHQ-9 Total Score:      Assessment/Plan:  Diagnoses and all orders for this visit:    1. Type 2 diabetes mellitus without complication, without long-term current use of insulin (Primary)  -     POC Glycosylated Hemoglobin (Hb A1C)  -     Semaglutide, 1 MG/DOSE, (Ozempic, 1 MG/DOSE,) 4 MG/3ML solution pen-injector; Inject 1 mg under the skin into the appropriate area as directed 1 (One) Time Per Week.  Dispense: 3 mL; Refill: 2  -     Comprehensive Metabolic Panel; Future  -     Lipid Panel; Future    2. Attention deficit hyperactivity disorder (ADHD), combined type    3. Episode of recurrent major depressive disorder, unspecified depression episode severity    4. Generalized anxiety disorder  -     escitalopram (LEXAPRO) 20 MG tablet; Take 1 tablet by mouth Every Morning.  Dispense: 90 tablet; Refill: 1    5. Mild episode of recurrent major depressive disorder  -     escitalopram (LEXAPRO) 20 MG tablet; Take 1 tablet by mouth Every Morning.  Dispense: 90 tablet; Refill: 1    6. Diabetes mellitus, new onset  Comments:  Adding Metformin 500 mg once a day with meal, continue with Idalia Katz RD  Orders:  -     metFORMIN (GLUCOPHAGE) 500 MG tablet; Take 1 tablet  by mouth Daily.  Dispense: 90 tablet; Refill: 1         1. Type 2 diabetes mellitus without complication, without long-term current use of insulin (HCC) (Primary)  - The patient's hemoglobin A1c is 5.3 percent today. She will continue with her current medication regimen.    2. Attention deficit hyperactivity disorder (ADHD), predominantly inattentive type  - The patient will continue with her current medication regimen.    3. Health maintenance  - The patient will have fasting laboratory work done today.    Patient Instructions   This patient is on a controlled substance which improves symptoms/quality of life and is aware of the risks, benefits and possible side-effects current treatment. The patient denies any medication side-effects at this time. A controlled substance agreement will be obtained or is currently on file. We reviewed required monitoring for controlled substances including but not limited to quarterly follow-up visits, annual depression screening, and urine drug screens to which the patient is agreeable. A [facility] report has been or shortly will be reviewed. There are no signs of deviation or misuse.     Plan of care reviewed with patient at the conclusion of today's visit. Education was provided regarding diagnosis, management and any prescribed or recommended OTC medications.  Patient verbalizes understanding of and agreement with management plan.    Return in about 3 months (around 3/20/2024) for Next scheduled follow up.    Dictated Utilizing Dragon Dictation.    VIVIANE Rowan      Transcribed from ambient dictation for VIVIANE Rowan by Roya Garcia.  12/20/23   16:29 EST    Patient or patient representative verbalized consent to the visit recording.  I have personally performed the services described in this document as transcribed by the above individual, and it is both accurate and complete.

## 2023-12-28 DIAGNOSIS — F90.8 ADHD, ADULT RESIDUAL TYPE: Chronic | ICD-10-CM

## 2023-12-28 RX ORDER — DEXTROAMPHETAMINE SACCHARATE, AMPHETAMINE ASPARTATE MONOHYDRATE, DEXTROAMPHETAMINE SULFATE AND AMPHETAMINE SULFATE 6.25; 6.25; 6.25; 6.25 MG/1; MG/1; MG/1; MG/1
25 CAPSULE, EXTENDED RELEASE ORAL EVERY MORNING
Qty: 30 CAPSULE | Refills: 0 | Status: SHIPPED | OUTPATIENT
Start: 2023-12-28

## 2024-01-15 RX ORDER — NORETHINDRONE ACETATE AND ETHINYL ESTRADIOL 1.5; .03 MG/1; MG/1
1 TABLET ORAL DAILY
Qty: 63 TABLET | Refills: 1 | Status: SHIPPED | OUTPATIENT
Start: 2024-01-15

## 2024-01-25 DIAGNOSIS — F90.8 ADHD, ADULT RESIDUAL TYPE: Chronic | ICD-10-CM

## 2024-01-26 RX ORDER — DEXTROAMPHETAMINE SACCHARATE, AMPHETAMINE ASPARTATE MONOHYDRATE, DEXTROAMPHETAMINE SULFATE AND AMPHETAMINE SULFATE 6.25; 6.25; 6.25; 6.25 MG/1; MG/1; MG/1; MG/1
25 CAPSULE, EXTENDED RELEASE ORAL EVERY MORNING
Qty: 30 CAPSULE | Refills: 0 | Status: SHIPPED | OUTPATIENT
Start: 2024-01-26

## 2024-01-26 NOTE — TELEPHONE ENCOUNTER
Rx Refill Note  Requested Prescriptions     Pending Prescriptions Disp Refills    amphetamine-dextroamphetamine XR (ADDERALL XR) 25 MG 24 hr capsule 30 capsule 0     Sig: Take 1 capsule by mouth Every Morning      Last refill:  12/28/23/ #30/0  Last office visit with prescribing clinician: 12/20/23   Last telemedicine visit with prescribing clinician: Visit date not found   Next office visit with prescribing clinician: 3/20/24                        Would you like a call back once the refill request has been completed: [] Yes [] No    If the office needs to give you a call back, can they leave a voicemail: [] Yes [] No    Kathie Vilchis RN  01/26/24, 08:53 EST

## 2024-02-06 LAB
ALBUMIN SERPL-MCNC: 4.4 G/DL (ref 4–5)
ALBUMIN/GLOB SERPL: 1.7 {RATIO} (ref 1.2–2.2)
ALP SERPL-CCNC: 87 IU/L (ref 44–121)
ALT SERPL-CCNC: 33 IU/L (ref 0–32)
AST SERPL-CCNC: 18 IU/L (ref 0–40)
BILIRUB SERPL-MCNC: 0.4 MG/DL (ref 0–1.2)
BUN SERPL-MCNC: 14 MG/DL (ref 6–20)
BUN/CREAT SERPL: 15 (ref 9–23)
CALCIUM SERPL-MCNC: 9.7 MG/DL (ref 8.7–10.2)
CHLORIDE SERPL-SCNC: 101 MMOL/L (ref 96–106)
CHOLEST SERPL-MCNC: 200 MG/DL (ref 100–199)
CO2 SERPL-SCNC: 22 MMOL/L (ref 20–29)
CREAT SERPL-MCNC: 0.91 MG/DL (ref 0.57–1)
EGFRCR SERPLBLD CKD-EPI 2021: 89 ML/MIN/1.73
GLOBULIN SER CALC-MCNC: 2.6 G/DL (ref 1.5–4.5)
GLUCOSE SERPL-MCNC: 85 MG/DL (ref 70–99)
HDLC SERPL-MCNC: 46 MG/DL
LDLC SERPL CALC-MCNC: 134 MG/DL (ref 0–99)
POTASSIUM SERPL-SCNC: 4.7 MMOL/L (ref 3.5–5.2)
PROT SERPL-MCNC: 7 G/DL (ref 6–8.5)
SODIUM SERPL-SCNC: 136 MMOL/L (ref 134–144)
TRIGL SERPL-MCNC: 109 MG/DL (ref 0–149)
VLDLC SERPL CALC-MCNC: 20 MG/DL (ref 5–40)

## 2024-02-24 DIAGNOSIS — F90.8 ADHD, ADULT RESIDUAL TYPE: Chronic | ICD-10-CM

## 2024-02-26 RX ORDER — DEXTROAMPHETAMINE SACCHARATE, AMPHETAMINE ASPARTATE MONOHYDRATE, DEXTROAMPHETAMINE SULFATE AND AMPHETAMINE SULFATE 6.25; 6.25; 6.25; 6.25 MG/1; MG/1; MG/1; MG/1
25 CAPSULE, EXTENDED RELEASE ORAL EVERY MORNING
Qty: 30 CAPSULE | Refills: 0 | Status: SHIPPED | OUTPATIENT
Start: 2024-02-26

## 2024-02-26 NOTE — TELEPHONE ENCOUNTER
Rx Refill Note  Requested Prescriptions     Pending Prescriptions Disp Refills    amphetamine-dextroamphetamine XR (ADDERALL XR) 25 MG 24 hr capsule 30 capsule 0     Sig: Take 1 capsule by mouth Every Morning      Last office visit with prescribing clinician: 12/20/23   Last telemedicine visit with prescribing clinician: Visit date not found   Next office visit with prescribing clinician: 3/20/24       LA: 01/26/24 #30 0R                          Would you like a call back once the refill request has been completed: [] Yes [] No    If the office needs to give you a call back, can they leave a voicemail: [] Yes [] No    Hope Newell LPN  02/26/24, 09:39 EST

## 2024-03-12 DIAGNOSIS — E11.9 TYPE 2 DIABETES MELLITUS WITHOUT COMPLICATION, WITHOUT LONG-TERM CURRENT USE OF INSULIN: ICD-10-CM

## 2024-03-13 DIAGNOSIS — E11.9 TYPE 2 DIABETES MELLITUS WITHOUT COMPLICATION, WITHOUT LONG-TERM CURRENT USE OF INSULIN: ICD-10-CM

## 2024-03-13 RX ORDER — SEMAGLUTIDE 1.34 MG/ML
1 INJECTION, SOLUTION SUBCUTANEOUS WEEKLY
Qty: 3 ML | Refills: 2 | Status: SHIPPED | OUTPATIENT
Start: 2024-03-13

## 2024-03-13 RX ORDER — SEMAGLUTIDE 1.34 MG/ML
1 INJECTION, SOLUTION SUBCUTANEOUS WEEKLY
Qty: 3 ML | Refills: 2 | OUTPATIENT
Start: 2024-03-13

## 2024-03-20 ENCOUNTER — OFFICE VISIT (OUTPATIENT)
Dept: INTERNAL MEDICINE | Facility: CLINIC | Age: 29
End: 2024-03-20
Payer: COMMERCIAL

## 2024-03-20 VITALS
HEIGHT: 69 IN | HEART RATE: 80 BPM | SYSTOLIC BLOOD PRESSURE: 116 MMHG | DIASTOLIC BLOOD PRESSURE: 72 MMHG | TEMPERATURE: 97.5 F | WEIGHT: 185 LBS | BODY MASS INDEX: 27.4 KG/M2

## 2024-03-20 DIAGNOSIS — I10 BENIGN ESSENTIAL HYPERTENSION: ICD-10-CM

## 2024-03-20 DIAGNOSIS — E11.9 TYPE 2 DIABETES MELLITUS WITHOUT COMPLICATION, WITHOUT LONG-TERM CURRENT USE OF INSULIN: Primary | ICD-10-CM

## 2024-03-20 DIAGNOSIS — F90.2 ATTENTION DEFICIT HYPERACTIVITY DISORDER (ADHD), COMBINED TYPE: ICD-10-CM

## 2024-03-20 PROBLEM — E66.811 OBESITY (BMI 30.0-34.9): Status: RESOLVED | Noted: 2022-07-25 | Resolved: 2024-03-20

## 2024-03-20 PROBLEM — E66.9 OBESITY (BMI 30.0-34.9): Status: RESOLVED | Noted: 2022-07-25 | Resolved: 2024-03-20

## 2024-03-20 LAB
EXPIRATION DATE: NORMAL
HBA1C MFR BLD: 5.6 % (ref 4.5–5.7)
Lab: NORMAL

## 2024-03-20 NOTE — PROGRESS NOTES
Isabell Oakes  1995  [id number]  Patient Care Team:  Yamilex Khan APRN as PCP - General (Internal Medicine)  Darling Watson APRN as Nurse Practitioner (Obstetrics and Gynecology)  Sherri Zuñiga APRN as Nurse Practitioner (Nurse Practitioner)  Lisa Treviño APRN as Nurse Practitioner (Family Medicine)    Isabell Oakes is a pleasant 28 y.o. female who presents for evaluation of Diabetes and Hypertension    Chief Complaint   Patient presents with    Diabetes    Hypertension       HPI:   Juanjo has a pertinent medical history including diabetes, hypothyroidism, hypertension, hyperlipidemia, morbid obesity, and psoriasis. She is working full time at [employer] after resigning from full time job as a .  She sees mental health provider at [facility].  She is here for a routine 3 mo visit.     She has lost 40 pounds since her last fall. She did a body composition at [employer] where she works in 07/2023 and lost 12 percent body fat.  She has not picked up her Ozempic for this month yet due to coupon savings milagro/cost. She is on Ozempic 2 mg. She has not had her Ozempic in a week. She is trying to monitor her dietary habits, but she is not food tracking.    She is doing well on Adderall 25 mg. She is concerned because she has been on Lexapro for over a decade and wants to taper off before she becomes pregnant. Her anxiety and depression are stable. She has had Zoloft in the past, but it did not work as well for her. She is waiting to hear back from her OB/GYN about SSRIs. She does not have a menstrual cycle with the birth control that she takes. She and her  are not ready to start trying yet.    She denies any thyroid symptoms. She has felt slight low energy levels, but attributes this to the season.   Past Medical History:   Diagnosis Date    ADHD (attention deficit hyperactivity disorder)     Anxiety     Bipolar disorder     Depression      Diabetes mellitus type II, controlled     Disease of thyroid gland     Hypertension     Hypothyroidism      Past Surgical History:   Procedure Laterality Date    TONSILLECTOMY  2001     Family History   Problem Relation Age of Onset    Diabetes Mother     Hypertension Mother     Hypertension Father     Bipolar disorder Father     Depression Father     Anxiety disorder Father     Arthritis Maternal Grandmother     Depression Maternal Grandmother     Hypertension Maternal Grandmother     Hyperlipidemia Maternal Grandmother     Cancer Maternal Grandfather         melanoma?    Arthritis Paternal Grandmother     Depression Paternal Grandmother     Hypertension Paternal Grandmother     Cancer Paternal Grandfather         lung (smoker)    Heart disease Paternal Grandfather      Social History     Tobacco Use   Smoking Status Never   Smokeless Tobacco Never     No Known Allergies    Current Outpatient Medications:     amphetamine-dextroamphetamine XR (ADDERALL XR) 25 MG 24 hr capsule, Take 1 capsule by mouth Every Morning, Disp: 30 capsule, Rfl: 0    cloNIDine (CATAPRES) 0.1 MG tablet, TAKE ONE TABLET BY MOUTH ONCE NIGHTLY, Disp: 90 tablet, Rfl: 1    escitalopram (LEXAPRO) 20 MG tablet, Take 1 tablet by mouth Every Morning., Disp: 90 tablet, Rfl: 1    Tessa 1.5/30 1.5-30 MG-MCG tablet, TAKE 1 TABLET BY MOUTH DAILY, Disp: 63 tablet, Rfl: 1    levothyroxine (SYNTHROID, LEVOTHROID) 75 MCG tablet, TAKE 1 TABLET BY MOUTH DAILY, Disp: 90 tablet, Rfl: 1    MAGNESIUM PO, Take 2 capsules by mouth Every Night., Disp: , Rfl:     metFORMIN (GLUCOPHAGE) 500 MG tablet, Take 1 tablet by mouth Daily., Disp: 90 tablet, Rfl: 1    Tremfya 100 MG/ML solution pen-injector, Every 3 (Three) Months., Disp: , Rfl:     tretinoin (RETIN-A) 0.05 % cream, Apply 1 Application topically to the appropriate area as directed 3 (Three) Times a Week., Disp: , Rfl:     Ozempic, 1 MG/DOSE, 4 MG/3ML solution pen-injector, DIAL AND INJECT UNDER THE SKIN 1 MG  "WEEKLY (Patient not taking: Reported on 3/20/2024), Disp: 3 mL, Rfl: 2    Review of Systems  Review of systems was completed, and pertinent findings are noted in the HPI.  /72 (BP Location: Left arm, Patient Position: Sitting, Cuff Size: Adult)   Pulse 80   Temp 97.5 °F (36.4 °C) (Temporal)   Ht 174 cm (68.5\")   Wt 83.9 kg (185 lb)   BMI 27.72 kg/m²     Physical Exam  Constitutional:       Appearance: She is well-developed and overweight.   HENT:      Head: Normocephalic and atraumatic.   Eyes:      Conjunctiva/sclera: Conjunctivae normal.      Pupils: Pupils are equal, round, and reactive to light.   Cardiovascular:      Rate and Rhythm: Normal rate and regular rhythm.      Pulses: Normal pulses.      Heart sounds: Normal heart sounds.   Pulmonary:      Effort: Pulmonary effort is normal.      Breath sounds: Normal breath sounds.   Musculoskeletal:         General: Normal range of motion.      Cervical back: Normal range of motion and neck supple.   Skin:     General: Skin is warm and dry.   Neurological:      Mental Status: She is alert and oriented to person, place, and time.   Psychiatric:         Mood and Affect: Mood normal.         Behavior: Behavior normal.         Thought Content: Thought content normal.         Judgment: Judgment normal.         PHQ-9 Total Score:      Assessment/Plan:  Diagnoses and all orders for this visit:    1. Type 2 diabetes mellitus without complication, without long-term current use of insulin (Primary)  -     POC Glycosylated Hemoglobin (Hb A1C)    2. Attention deficit hyperactivity disorder (ADHD), combined type    3. Benign essential hypertension       1. Diabetes.  Her A1c has been stable.   She will decrease her Ozempic to 0.5 mg since we have samples..   She will continue with exercise.    2. ADHD.  She is doing well on Adderall 25 mg.    3. Anxiety and depression.  She has been on Lexapro for over a decade.   She will discuss with her OB/GYN about SSRIs.   If her " OB/GYN says it is not ideal, I would consider switching to something else.    4. Hypothyroidism.  She is on levothyroxine.   I will check her TSH in 3 months given she has loss weight.     5. Health maintenance.  She will add prenatal vitamin and folic acid.    Follow-up  The patient will follow up in 3 months with labs.  Patient Instructions   This patient is on a controlled substance which improves symptoms/quality of life and is aware of the risks, benefits and possible side-effects current treatment. The patient denies any medication side-effects at this time. A controlled substance agreement will be obtained or is currently on file. We reviewed required monitoring for controlled substances including but not limited to quarterly follow-up visits, annual depression screening, and urine drug screens to which the patient is agreeable. A [facility] report has been or shortly will be reviewed. There are no signs of deviation or misuse.     Plan of care reviewed with patient at the conclusion of today's visit. Education was provided regarding diagnosis, management and any prescribed or recommended OTC medications.  Patient verbalizes understanding of and agreement with management plan.    Return in about 3 months (around 6/20/2024) for Labs next visit.    Dictated Utilizing Dragon Dictation.    VIVIANE Rowan      Transcribed from ambient dictation for VIVIANE Rowan by Priya Ta.  03/20/24   14:59 EDT    Patient or patient representative verbalized consent to the visit recording.  I have personally performed the services described in this document as transcribed by the above individual, and it is both accurate and complete.

## 2024-03-28 DIAGNOSIS — F90.8 ADHD, ADULT RESIDUAL TYPE: Chronic | ICD-10-CM

## 2024-03-29 RX ORDER — DEXTROAMPHETAMINE SACCHARATE, AMPHETAMINE ASPARTATE MONOHYDRATE, DEXTROAMPHETAMINE SULFATE AND AMPHETAMINE SULFATE 6.25; 6.25; 6.25; 6.25 MG/1; MG/1; MG/1; MG/1
25 CAPSULE, EXTENDED RELEASE ORAL EVERY MORNING
Qty: 30 CAPSULE | Refills: 0 | Status: SHIPPED | OUTPATIENT
Start: 2024-03-29

## 2024-04-01 NOTE — TELEPHONE ENCOUNTER
Rx Refill Note  Requested Prescriptions     Pending Prescriptions Disp Refills    amphetamine-dextroamphetamine XR (ADDERALL XR) 25 MG 24 hr capsule 30 capsule 0     Sig: Take 1 capsule by mouth Every Morning      Last office visit with prescribing clinician: Visit date not found   Last telemedicine visit with prescribing clinician: Visit date not found   Next office visit with prescribing clinician: Visit date not found                         Would you like a call back once the refill request has been completed: [] Yes [] No    If the office needs to give you a call back, can they leave a voicemail: [] Yes [] No    Radha Lopez LPN  03/29/24, 09:18 EDT  
No

## 2024-04-17 ENCOUNTER — PRIOR AUTHORIZATION (OUTPATIENT)
Dept: INTERNAL MEDICINE | Facility: CLINIC | Age: 29
End: 2024-04-17
Payer: COMMERCIAL

## 2024-04-17 NOTE — TELEPHONE ENCOUNTER
Received Pa for Ozempic. Submitted via [facility]. Key: [id number]. Pending response.

## 2024-04-18 NOTE — TELEPHONE ENCOUNTER
Received Denial because they did not get records. Per the letter I can send additional information to [phone]. I have faxed office note and A1c results with most recent and highest being 6.6 for a re-review.

## 2024-04-19 NOTE — TELEPHONE ENCOUNTER
I received a fax today that I needed to do an appeal. I called insurance and I was advised I just need to do a new PA. So I did the PA over the phone and faxed records to [phone]. New reference # is [id number]. Should have a response in 48 hours.

## 2024-04-25 DIAGNOSIS — F90.8 ADHD, ADULT RESIDUAL TYPE: Chronic | ICD-10-CM

## 2024-04-25 NOTE — TELEPHONE ENCOUNTER
Rx Refill Note  Requested Prescriptions     Pending Prescriptions Disp Refills    amphetamine-dextroamphetamine XR (ADDERALL XR) 25 MG 24 hr capsule 30 capsule 0     Sig: Take 1 capsule by mouth Every Morning      Last refill:  3/29/24 #30/0  Last office visit with prescribing clinician: 3/20/24  Last telemedicine visit with prescribing clinician: Visit date not found   Next office visit with prescribing clinician: 6/20/24                         Would you like a call back once the refill request has been completed: [] Yes [] No    If the office needs to give you a call back, can they leave a voicemail: [] Yes [] No    Kathie Vilchis RN  04/25/24, 10:59 EDT

## 2024-04-26 RX ORDER — DEXTROAMPHETAMINE SACCHARATE, AMPHETAMINE ASPARTATE MONOHYDRATE, DEXTROAMPHETAMINE SULFATE AND AMPHETAMINE SULFATE 6.25; 6.25; 6.25; 6.25 MG/1; MG/1; MG/1; MG/1
25 CAPSULE, EXTENDED RELEASE ORAL EVERY MORNING
Qty: 30 CAPSULE | Refills: 0 | Status: SHIPPED | OUTPATIENT
Start: 2024-04-26

## 2024-04-29 ENCOUNTER — PRIOR AUTHORIZATION (OUTPATIENT)
Dept: INTERNAL MEDICINE | Facility: CLINIC | Age: 29
End: 2024-04-29
Payer: COMMERCIAL

## 2024-04-29 NOTE — TELEPHONE ENCOUNTER
Received notice from pt rx needed PA. Found it on [facility]. Key: [id number]. PA approved from 04/29/24-04/29/25

## 2024-05-13 DIAGNOSIS — F90.8 ADHD, ADULT RESIDUAL TYPE: Chronic | ICD-10-CM

## 2024-05-13 DIAGNOSIS — F41.1 GENERALIZED ANXIETY DISORDER: Chronic | ICD-10-CM

## 2024-05-13 RX ORDER — CLONIDINE HYDROCHLORIDE 0.1 MG/1
0.1 TABLET ORAL NIGHTLY
Qty: 90 TABLET | Refills: 1 | Status: SHIPPED | OUTPATIENT
Start: 2024-05-13

## 2024-05-24 RX ORDER — NORETHINDRONE ACETATE AND ETHINYL ESTRADIOL 1.5; .03 MG/1; MG/1
1 TABLET ORAL DAILY
Qty: 63 TABLET | Refills: 1 | Status: SHIPPED | OUTPATIENT
Start: 2024-05-24

## 2024-05-28 DIAGNOSIS — F90.8 ADHD, ADULT RESIDUAL TYPE: Chronic | ICD-10-CM

## 2024-05-28 NOTE — TELEPHONE ENCOUNTER
Rx Refill Note  Requested Prescriptions     Pending Prescriptions Disp Refills    amphetamine-dextroamphetamine XR (ADDERALL XR) 25 MG 24 hr capsule 30 capsule 0     Sig: Take 1 capsule by mouth Every Morning      Last office visit with prescribing clinician: Visit date not found   Last telemedicine visit with prescribing clinician: Visit date not found   Next office visit with prescribing clinician: Visit date not found                         Would you like a call back once the refill request has been completed: [] Yes [] No    If the office needs to give you a call back, can they leave a voicemail: [] Yes [] No    Lizy Silvestre MA  05/28/24, 10:11 EDT

## 2024-05-29 DIAGNOSIS — F90.8 ADHD, ADULT RESIDUAL TYPE: Chronic | ICD-10-CM

## 2024-05-29 RX ORDER — DEXTROAMPHETAMINE SACCHARATE, AMPHETAMINE ASPARTATE MONOHYDRATE, DEXTROAMPHETAMINE SULFATE AND AMPHETAMINE SULFATE 6.25; 6.25; 6.25; 6.25 MG/1; MG/1; MG/1; MG/1
25 CAPSULE, EXTENDED RELEASE ORAL EVERY MORNING
Qty: 30 CAPSULE | Refills: 0 | Status: SHIPPED | OUTPATIENT
Start: 2024-05-29

## 2024-05-29 NOTE — TELEPHONE ENCOUNTER
PATIENT IS CALLING TO CHECK ON THE STATUS OF THIS REFILL SHE SAYS THAT SHE IS DUE TO HAVE THIS TOMORROW.

## 2024-05-30 ENCOUNTER — OFFICE VISIT (OUTPATIENT)
Dept: INTERNAL MEDICINE | Facility: CLINIC | Age: 29
End: 2024-05-30
Payer: COMMERCIAL

## 2024-05-30 VITALS
BODY MASS INDEX: 27.61 KG/M2 | DIASTOLIC BLOOD PRESSURE: 68 MMHG | HEART RATE: 85 BPM | OXYGEN SATURATION: 96 % | TEMPERATURE: 98.4 F | WEIGHT: 186.4 LBS | HEIGHT: 69 IN | SYSTOLIC BLOOD PRESSURE: 128 MMHG

## 2024-05-30 DIAGNOSIS — I10 BENIGN ESSENTIAL HYPERTENSION: ICD-10-CM

## 2024-05-30 DIAGNOSIS — J01.00 ACUTE NON-RECURRENT MAXILLARY SINUSITIS: Primary | ICD-10-CM

## 2024-05-30 LAB
EXPIRATION DATE: NORMAL
EXPIRATION DATE: NORMAL
FLUAV AG UPPER RESP QL IA.RAPID: NOT DETECTED
FLUBV AG UPPER RESP QL IA.RAPID: NOT DETECTED
INTERNAL CONTROL: NORMAL
INTERNAL CONTROL: NORMAL
Lab: NORMAL
Lab: NORMAL
S PYO AG THROAT QL: NEGATIVE
SARS-COV-2 AG UPPER RESP QL IA.RAPID: NOT DETECTED

## 2024-05-30 PROCEDURE — 87428 SARSCOV & INF VIR A&B AG IA: CPT | Performed by: PHYSICIAN ASSISTANT

## 2024-05-30 PROCEDURE — 87880 STREP A ASSAY W/OPTIC: CPT | Performed by: PHYSICIAN ASSISTANT

## 2024-05-30 PROCEDURE — 99213 OFFICE O/P EST LOW 20 MIN: CPT | Performed by: PHYSICIAN ASSISTANT

## 2024-05-30 RX ORDER — AZITHROMYCIN 250 MG/1
TABLET, FILM COATED ORAL
Qty: 6 TABLET | Refills: 0 | Status: SHIPPED | OUTPATIENT
Start: 2024-05-30

## 2024-05-30 RX ORDER — DEXTROAMPHETAMINE SACCHARATE, AMPHETAMINE ASPARTATE MONOHYDRATE, DEXTROAMPHETAMINE SULFATE AND AMPHETAMINE SULFATE 6.25; 6.25; 6.25; 6.25 MG/1; MG/1; MG/1; MG/1
25 CAPSULE, EXTENDED RELEASE ORAL EVERY MORNING
Qty: 30 CAPSULE | Refills: 0 | OUTPATIENT
Start: 2024-05-30

## 2024-05-30 RX ORDER — BENZONATATE 200 MG/1
200 CAPSULE ORAL
Qty: 30 CAPSULE | Refills: 1 | Status: SHIPPED | OUTPATIENT
Start: 2024-05-30

## 2024-05-30 NOTE — PROGRESS NOTES
[facility] Internal Medicine    Isabell Oakes  1995   [id number]      Patient Care Team:  Yamilex Khan APRN as PCP - General (Internal Medicine)  Darling Watson APRN as Nurse Practitioner (Obstetrics and Gynecology)  Sherri Zuñiga APRN as Nurse Practitioner (Nurse Practitioner)  Lisa Treviño APRN as Nurse Practitioner (Family Medicine)    Chief Complaint::   Chief Complaint   Patient presents with    Sore Throat    Cough     A week    Nasal Congestion        HPI  Isabell Oakes is a 28-year-old female date of birth 1995 who presents today for evaluation of sinus congestion, postnasal drainage, and cough.  She reports symptoms started over a week ago.  She has taken over-the-counter medications without any improvement.  She denies body aches or fever.  She continues to have considerable amount of pressure along her frontal sinus, maxillary sinuses.      Patient Active Problem List   Diagnosis    Vitamin D deficiency    Episode of recurrent major depressive disorder    Abnormal menstrual periods    Psoriasis    Benign essential hypertension    Type 2 diabetes mellitus without complication, without long-term current use of insulin    Hepatic steatosis    Attention deficit disorder    Acute non-recurrent maxillary sinusitis        Past Medical History:   Diagnosis Date    ADHD (attention deficit hyperactivity disorder)     Anxiety     Bipolar disorder     Depression     Diabetes mellitus type II, controlled     Disease of thyroid gland     Hypertension     Hypothyroidism        Past Surgical History:   Procedure Laterality Date    TONSILLECTOMY  2001       Family History   Problem Relation Age of Onset    Diabetes Mother     Hypertension Mother     Hypertension Father     Bipolar disorder Father     Depression Father     Anxiety disorder Father     Arthritis Maternal Grandmother     Depression Maternal Grandmother     Hypertension Maternal Grandmother     Hyperlipidemia  "Maternal Grandmother     Cancer Maternal Grandfather         melanoma?    Arthritis Paternal Grandmother     Depression Paternal Grandmother     Hypertension Paternal Grandmother     Cancer Paternal Grandfather         lung (smoker)    Heart disease Paternal Grandfather        Social History     Socioeconomic History    Marital status:    Tobacco Use    Smoking status: Never    Smokeless tobacco: Never   Vaping Use    Vaping status: Never Used   Substance and Sexual Activity    Alcohol use: Not Currently    Drug use: Never    Sexual activity: Yes     Partners: Male     Birth control/protection: Condom, Birth control pill, Pill       No Known Allergies    Review of Systems   HENT:  Positive for congestion, sinus pressure, sore throat and swollen glands.    Eyes:  Negative for blurred vision and double vision.   Endocrine: Negative for cold intolerance and heat intolerance.        Vital Signs  Vitals:    05/30/24 1457   BP: 128/68   BP Location: Right arm   Patient Position: Sitting   Cuff Size: Adult   Pulse: 85   Temp: 98.4 °F (36.9 °C)   TempSrc: Infrared   SpO2: 96%   Weight: 84.6 kg (186 lb 6.4 oz)   Height: 174 cm (68.5\")   PainSc: 0-No pain     Body mass index is 27.93 kg/m².        Advance Care Planning   ACP discussion was held with the patient during this visit. Patient does not have an advance directive, information provided.       Current Outpatient Medications:     amphetamine-dextroamphetamine XR (ADDERALL XR) 25 MG 24 hr capsule, Take 1 capsule by mouth Every Morning, Disp: 30 capsule, Rfl: 0    cloNIDine (CATAPRES) 0.1 MG tablet, TAKE ONE TABLET BY MOUTH ONCE NIGHTLY, Disp: 90 tablet, Rfl: 1    escitalopram (LEXAPRO) 20 MG tablet, Take 1 tablet by mouth Every Morning., Disp: 90 tablet, Rfl: 1    Tessa 1.5/30 1.5-30 MG-MCG tablet, TAKE 1 TABLET BY MOUTH DAILY, Disp: 63 tablet, Rfl: 1    levothyroxine (SYNTHROID, LEVOTHROID) 75 MCG tablet, TAKE 1 TABLET BY MOUTH DAILY, Disp: 90 tablet, Rfl: 1    " MAGNESIUM PO, Take 2 capsules by mouth Every Night., Disp: , Rfl:     metFORMIN (GLUCOPHAGE) 500 MG tablet, Take 1 tablet by mouth Daily., Disp: 90 tablet, Rfl: 1    Ozempic, 1 MG/DOSE, 4 MG/3ML solution pen-injector, DIAL AND INJECT UNDER THE SKIN 1 MG WEEKLY, Disp: 3 mL, Rfl: 2    Tremfya 100 MG/ML solution pen-injector, Every 3 (Three) Months., Disp: , Rfl:     tretinoin (RETIN-A) 0.05 % cream, Apply 1 Application topically to the appropriate area as directed 3 (Three) Times a Week., Disp: , Rfl:     azithromycin (Zithromax Z-Jem) 250 MG tablet, Take 2 tablets by mouth on day 1, then 1 tablet daily on days 2-5, Disp: 6 tablet, Rfl: 0    benzonatate (TESSALON) 200 MG capsule, Take 1 capsule by mouth every night at bedtime., Disp: 30 capsule, Rfl: 1    Physical Exam  Vitals and nursing note reviewed.   Constitutional:       Appearance: She is well-developed.   HENT:      Head: Normocephalic and atraumatic.      Right Ear: Tympanic membrane and ear canal normal.      Left Ear: Tympanic membrane and ear canal normal.      Nose: Mucosal edema and rhinorrhea present.      Right Sinus: Maxillary sinus tenderness and frontal sinus tenderness present.      Left Sinus: Maxillary sinus tenderness and frontal sinus tenderness present.      Mouth/Throat:      Pharynx: No oropharyngeal exudate.   Eyes:      Pupils: Pupils are equal, round, and reactive to light.   Cardiovascular:      Rate and Rhythm: Normal rate and regular rhythm.   Pulmonary:      Effort: Pulmonary effort is normal.      Breath sounds: Normal breath sounds.          ACE III MINI            Results Review:    Recent Results (from the past 672 hour(s))   POCT SARS-CoV-2 + Flu Antigen ANN    Collection Time: 05/30/24  3:53 PM    Specimen: Swab   Result Value Ref Range    SARS Antigen Not Detected Not Detected, Presumptive Negative    Influenza A Antigen ANN Not Detected Not Detected    Influenza B Antigen ANN Not Detected Not Detected    Internal Control  Passed Passed    Lot Number [id number]     Expiration Date 2/15/2025    POC Rapid Strep A    Collection Time: 05/30/24  3:54 PM    Specimen: Swab   Result Value Ref Range    Rapid Strep A Screen Negative Negative, VALID, INVALID, Not Performed    Internal Control Passed Passed    Lot Number [id number]     Expiration Date 10/27/2026      Procedures    Medication Review: Medications reviewed and noted    Social History     Socioeconomic History    Marital status:    Tobacco Use    Smoking status: Never    Smokeless tobacco: Never   Vaping Use    Vaping status: Never Used   Substance and Sexual Activity    Alcohol use: Not Currently    Drug use: Never    Sexual activity: Yes     Partners: Male     Birth control/protection: Condom, Birth control pill, Pill        Assessment/Plan:    Diagnoses and all orders for this visit:    1. Acute non-recurrent maxillary sinusitis (Primary)  -     azithromycin (Zithromax Z-Jem) 250 MG tablet; Take 2 tablets by mouth on day 1, then 1 tablet daily on days 2-5  Dispense: 6 tablet; Refill: 0  -     benzonatate (TESSALON) 200 MG capsule; Take 1 capsule by mouth every night at bedtime.  Dispense: 30 capsule; Refill: 1  -     POCT SARS-CoV-2 + Flu Antigen ANN  -     POC Rapid Strep A    2. Benign essential hypertension  Overview:  Continue hydrochlorothiazide 25 mg tablets daily.  Stop pseudoephedrine.  Check blood pressures at home.  Try and drink more water.         Plan of care reviewed with patient at the conclusion of today's visit. Education was provided regarding diagnosis, management, and any prescribed or recommended OTC medications.Patient verbalizes understanding of and agreement with management plan.         I have seen Isabell on this date of service:preparing for the visit, reviewing tests, obtaining and/or reviewing a separately obtained history, performing a medically appropriate examination and/or evaluation , counseling and educating the patient/family/caregiver,  ordering medications, tests, or procedures, referring and communicating with other health care professionals , and documenting information in the medical record    Yasmine Vargas PA-C      Note: Part of this note may be an electronic transcription/translation of spoken language to printed text using the Dragon Dictation system.

## 2024-06-20 ENCOUNTER — LAB (OUTPATIENT)
Dept: LAB | Facility: HOSPITAL | Age: 29
End: 2024-06-20
Payer: COMMERCIAL

## 2024-06-20 ENCOUNTER — OFFICE VISIT (OUTPATIENT)
Dept: INTERNAL MEDICINE | Facility: CLINIC | Age: 29
End: 2024-06-20
Payer: COMMERCIAL

## 2024-06-20 VITALS
BODY MASS INDEX: 27.19 KG/M2 | DIASTOLIC BLOOD PRESSURE: 80 MMHG | OXYGEN SATURATION: 98 % | HEART RATE: 68 BPM | SYSTOLIC BLOOD PRESSURE: 124 MMHG | WEIGHT: 183.6 LBS | HEIGHT: 69 IN | TEMPERATURE: 98.2 F

## 2024-06-20 DIAGNOSIS — I10 BENIGN ESSENTIAL HYPERTENSION: ICD-10-CM

## 2024-06-20 DIAGNOSIS — Z79.899 HIGH RISK MEDICATION USE: ICD-10-CM

## 2024-06-20 DIAGNOSIS — E11.9 TYPE 2 DIABETES MELLITUS WITHOUT COMPLICATION, WITHOUT LONG-TERM CURRENT USE OF INSULIN: ICD-10-CM

## 2024-06-20 DIAGNOSIS — F90.2 ATTENTION DEFICIT HYPERACTIVITY DISORDER (ADHD), COMBINED TYPE: Primary | ICD-10-CM

## 2024-06-20 PROBLEM — J01.00 ACUTE NON-RECURRENT MAXILLARY SINUSITIS: Status: RESOLVED | Noted: 2024-05-30 | Resolved: 2024-06-20

## 2024-06-20 LAB
ALBUMIN SERPL-MCNC: 4.2 G/DL (ref 3.5–5.2)
ALBUMIN UR-MCNC: <1.2 MG/DL
ALBUMIN/GLOB SERPL: 1.4 G/DL
ALP SERPL-CCNC: 75 U/L (ref 39–117)
ALT SERPL W P-5'-P-CCNC: 19 U/L (ref 1–33)
AMPHET+METHAMPHET UR QL: POSITIVE
AMPHETAMINES UR QL: NEGATIVE
ANION GAP SERPL CALCULATED.3IONS-SCNC: 9 MMOL/L (ref 5–15)
AST SERPL-CCNC: 13 U/L (ref 1–32)
BARBITURATES UR QL SCN: NEGATIVE
BENZODIAZ UR QL SCN: NEGATIVE
BILIRUB SERPL-MCNC: 0.4 MG/DL (ref 0–1.2)
BUN SERPL-MCNC: 11 MG/DL (ref 6–20)
BUN/CREAT SERPL: 13.1 (ref 7–25)
BUPRENORPHINE SERPL-MCNC: NEGATIVE NG/ML
CALCIUM SPEC-SCNC: 9.3 MG/DL (ref 8.6–10.5)
CANNABINOIDS SERPL QL: NEGATIVE
CHLORIDE SERPL-SCNC: 103 MMOL/L (ref 98–107)
CHOLEST SERPL-MCNC: 188 MG/DL (ref 0–200)
CO2 SERPL-SCNC: 24 MMOL/L (ref 22–29)
COCAINE UR QL: NEGATIVE
CREAT SERPL-MCNC: 0.84 MG/DL (ref 0.57–1)
CREAT UR-MCNC: 173.3 MG/DL
EGFRCR SERPLBLD CKD-EPI 2021: 97.8 ML/MIN/1.73
FENTANYL UR-MCNC: NEGATIVE NG/ML
GLOBULIN UR ELPH-MCNC: 2.9 GM/DL
GLUCOSE SERPL-MCNC: 80 MG/DL (ref 65–99)
HBA1C MFR BLD: 5.3 % (ref 4.8–5.6)
HDLC SERPL-MCNC: 47 MG/DL (ref 40–60)
LDLC SERPL CALC-MCNC: 127 MG/DL (ref 0–100)
LDLC/HDLC SERPL: 2.68 {RATIO}
METHADONE UR QL SCN: NEGATIVE
MICROALBUMIN/CREAT UR: NORMAL MG/G{CREAT}
OPIATES UR QL: NEGATIVE
OXYCODONE UR QL SCN: NEGATIVE
PCP UR QL SCN: NEGATIVE
POTASSIUM SERPL-SCNC: 4.8 MMOL/L (ref 3.5–5.2)
PROT SERPL-MCNC: 7.1 G/DL (ref 6–8.5)
SODIUM SERPL-SCNC: 136 MMOL/L (ref 136–145)
TRICYCLICS UR QL SCN: NEGATIVE
TRIGL SERPL-MCNC: 75 MG/DL (ref 0–150)
VLDLC SERPL-MCNC: 14 MG/DL (ref 5–40)

## 2024-06-20 PROCEDURE — 80307 DRUG TEST PRSMV CHEM ANLYZR: CPT

## 2024-06-20 PROCEDURE — 82570 ASSAY OF URINE CREATININE: CPT

## 2024-06-20 PROCEDURE — 80053 COMPREHEN METABOLIC PANEL: CPT

## 2024-06-20 PROCEDURE — 83036 HEMOGLOBIN GLYCOSYLATED A1C: CPT

## 2024-06-20 PROCEDURE — 80061 LIPID PANEL: CPT

## 2024-06-20 PROCEDURE — 82043 UR ALBUMIN QUANTITATIVE: CPT

## 2024-06-20 PROCEDURE — 99214 OFFICE O/P EST MOD 30 MIN: CPT | Performed by: NURSE PRACTITIONER

## 2024-06-20 NOTE — PROGRESS NOTES
Isabell Oakes  1995  [id number]  Patient Care Team:  Yamilex Khan APRN as PCP - General (Internal Medicine)  Caridad Conrad PA (Physician Assistant)    Isabell Oakes is a pleasant 28 y.o. female who presents for evaluation of Type 2 Diabetes (3 month follow-up)    Chief Complaint   Patient presents with    Type 2 Diabetes     3 month follow-up       HPI:   History of Present Illness  Juanjo is a 28-year-old female who is here for 3-month routine follow-up. She has a pertinent medical history including diabetes, hypothyroidism, hypertension, hyperlipidemia, and psoriasis. She is working full time at [employer]. She resigned from her full-time job as a  a few years ago. She sees a mental health provider at [facility].    Diabetes: A1c with labs today at the lab.  She has been in good control over the last few visits.  She is doing well on Ozempic weekly the patient has not been monitoring her blood glucose levels at home recently. She has experienced a weight loss of 3 pounds since her last visit. She engages in physical exercise 2 to 4 days per week. She utilizes contact lenses for vision correction and has recently transitioned to a new ophthalmologist, Dr. Matias Kaiser, whom she last saw approximately a year ago. She reports no peripheral neuropathy symptoms such as numbness or tingling in her feet.    ADHD: She is doing well on oral 25 mg daily.  She is still seeing her therapist. She has an appointment with the dermatologist on Monday.     Results      Past Medical History:   Diagnosis Date    ADHD (attention deficit hyperactivity disorder)     Anxiety     Bipolar disorder     Depression     Diabetes mellitus type II, controlled     Disease of thyroid gland     Hypertension     Hypothyroidism      Past Surgical History:   Procedure Laterality Date    TONSILLECTOMY  2001     Family History   Problem Relation Age of Onset    Diabetes Mother      Hypertension Mother     Hypertension Father     Bipolar disorder Father     Depression Father     Anxiety disorder Father     Arthritis Maternal Grandmother     Depression Maternal Grandmother     Hypertension Maternal Grandmother     Hyperlipidemia Maternal Grandmother     Cancer Maternal Grandfather         melanoma?    Arthritis Paternal Grandmother     Depression Paternal Grandmother     Hypertension Paternal Grandmother     Cancer Paternal Grandfather         lung (smoker)    Heart disease Paternal Grandfather      Social History     Tobacco Use   Smoking Status Never   Smokeless Tobacco Never     No Known Allergies    Current Outpatient Medications:     amphetamine-dextroamphetamine XR (ADDERALL XR) 25 MG 24 hr capsule, Take 1 capsule by mouth Every Morning, Disp: 30 capsule, Rfl: 0    cloNIDine (CATAPRES) 0.1 MG tablet, TAKE ONE TABLET BY MOUTH ONCE NIGHTLY, Disp: 90 tablet, Rfl: 1    escitalopram (LEXAPRO) 20 MG tablet, Take 1 tablet by mouth Every Morning., Disp: 90 tablet, Rfl: 1    Tessa 1.5/30 1.5-30 MG-MCG tablet, TAKE 1 TABLET BY MOUTH DAILY, Disp: 63 tablet, Rfl: 1    levothyroxine (SYNTHROID, LEVOTHROID) 75 MCG tablet, TAKE 1 TABLET BY MOUTH DAILY, Disp: 90 tablet, Rfl: 1    MAGNESIUM PO, Take 2 capsules by mouth Every Night., Disp: , Rfl:     metFORMIN (GLUCOPHAGE) 500 MG tablet, Take 1 tablet by mouth Daily., Disp: 90 tablet, Rfl: 1    Ozempic, 1 MG/DOSE, 4 MG/3ML solution pen-injector, DIAL AND INJECT UNDER THE SKIN 1 MG WEEKLY, Disp: 3 mL, Rfl: 2    Tremfya 100 MG/ML solution pen-injector, Every 3 (Three) Months., Disp: , Rfl:     tretinoin (RETIN-A) 0.05 % cream, Apply 1 Application topically to the appropriate area as directed 3 (Three) Times a Week., Disp: , Rfl:     Review of Systems  Review of systems was completed, and pertinent findings are noted in the HPI.  /80 (BP Location: Left arm, Patient Position: Sitting, Cuff Size: Adult)   Pulse 68   Temp 98.2 °F (36.8 °C) (Infrared)    "Ht 174 cm (68.5\")   Wt 83.3 kg (183 lb 9.6 oz)   SpO2 98%   BMI 27.51 kg/m²     Physical Exam  Vitals reviewed.   Constitutional:       Appearance: She is well-developed.   HENT:      Head: Normocephalic and atraumatic.   Eyes:      Conjunctiva/sclera: Conjunctivae normal.      Pupils: Pupils are equal, round, and reactive to light.   Cardiovascular:      Rate and Rhythm: Normal rate and regular rhythm.      Pulses: Normal pulses.      Heart sounds: Normal heart sounds.   Pulmonary:      Effort: Pulmonary effort is normal.      Breath sounds: Normal breath sounds.   Musculoskeletal:         General: Normal range of motion.      Cervical back: Normal range of motion and neck supple.      Right foot: Normal range of motion.      Left foot: Normal range of motion.   Feet:      Right foot:      Skin integrity: Skin integrity normal.      Left foot:      Skin integrity: Skin integrity normal.      Comments: Diabetic Foot Exam Performed and Monofilament Test Performed    Skin:     General: Skin is warm and dry.   Neurological:      Mental Status: She is alert and oriented to person, place, and time.   Psychiatric:         Mood and Affect: Mood normal.         Behavior: Behavior normal.         Thought Content: Thought content normal.         Judgment: Judgment normal.       Physical Exam      PHQ-9 Total Score:      Assessment/Plan:  Assessment & Plan  1.  Diabetes   Continue weekly Ozempic, regular exercise, diabetic diet, fasting laboratory tests have been ordered.  2.  ADHD  Continue Adderall daily  Follow-up  A follow-up appointment is scheduled for 3 months from now.    Patient Instructions   This patient is on a controlled substance which improves symptoms/quality of life and is aware of the risks, benefits and possible side-effects current treatment. The patient denies any medication side-effects at this time. A controlled substance agreement will be obtained or is currently on file. We reviewed required " monitoring for controlled substances including but not limited to quarterly follow-up visits, annual depression screening, and urine drug screens to which the patient is agreeable. A [facility] report has been or shortly will be reviewed. There are no signs of deviation or misuse.     Plan of care reviewed with patient at the conclusion of today's visit. Education was provided regarding diagnosis, management and any prescribed or recommended OTC medications.  Patient verbalizes understanding of and agreement with management plan.    Return in about 3 months (around 9/20/2024) for Labs this visit.    Dictated Utilizing Dragon Dictation.    VIVIANE Rowan       Patient or patient representative verbalized consent for the use of Ambient Listening during the visit with  VIVIANE Rowan for chart documentation. 6/20/2024  10:11 EDT

## 2024-06-23 DIAGNOSIS — E11.9 TYPE 2 DIABETES MELLITUS WITHOUT COMPLICATION, WITHOUT LONG-TERM CURRENT USE OF INSULIN: ICD-10-CM

## 2024-06-23 RX ORDER — FLASH GLUCOSE SENSOR
1 KIT MISCELLANEOUS
Qty: 2 EACH | Refills: 2 | Status: SHIPPED | OUTPATIENT
Start: 2024-06-23

## 2024-06-24 DIAGNOSIS — E11.9 TYPE 2 DIABETES MELLITUS WITHOUT COMPLICATION, WITHOUT LONG-TERM CURRENT USE OF INSULIN: ICD-10-CM

## 2024-06-24 DIAGNOSIS — F90.8 ADHD, ADULT RESIDUAL TYPE: Chronic | ICD-10-CM

## 2024-06-24 RX ORDER — SEMAGLUTIDE 1.34 MG/ML
1 INJECTION, SOLUTION SUBCUTANEOUS WEEKLY
Qty: 3 ML | Refills: 2 | Status: CANCELLED | OUTPATIENT
Start: 2024-06-24

## 2024-06-24 RX ORDER — SEMAGLUTIDE 1.34 MG/ML
1 INJECTION, SOLUTION SUBCUTANEOUS WEEKLY
Qty: 3 ML | Refills: 2 | Status: SHIPPED | OUTPATIENT
Start: 2024-06-24

## 2024-06-26 RX ORDER — DEXTROAMPHETAMINE SACCHARATE, AMPHETAMINE ASPARTATE MONOHYDRATE, DEXTROAMPHETAMINE SULFATE AND AMPHETAMINE SULFATE 6.25; 6.25; 6.25; 6.25 MG/1; MG/1; MG/1; MG/1
25 CAPSULE, EXTENDED RELEASE ORAL EVERY MORNING
Qty: 30 CAPSULE | Refills: 0 | Status: SHIPPED | OUTPATIENT
Start: 2024-06-26

## 2024-07-10 RX ORDER — AMOXICILLIN AND CLAVULANATE POTASSIUM 875; 125 MG/1; MG/1
1 TABLET, FILM COATED ORAL 2 TIMES DAILY
Qty: 14 TABLET | Refills: 0 | Status: SHIPPED | OUTPATIENT
Start: 2024-07-10

## 2024-07-20 DIAGNOSIS — F33.0 MILD EPISODE OF RECURRENT MAJOR DEPRESSIVE DISORDER: Chronic | ICD-10-CM

## 2024-07-20 DIAGNOSIS — E11.9 DIABETES MELLITUS, NEW ONSET: ICD-10-CM

## 2024-07-20 DIAGNOSIS — F41.1 GENERALIZED ANXIETY DISORDER: Chronic | ICD-10-CM

## 2024-07-22 RX ORDER — ESCITALOPRAM OXALATE 20 MG/1
20 TABLET ORAL EVERY MORNING
Qty: 90 TABLET | Refills: 1 | Status: SHIPPED | OUTPATIENT
Start: 2024-07-22

## 2024-07-26 DIAGNOSIS — F90.8 ADHD, ADULT RESIDUAL TYPE: Chronic | ICD-10-CM

## 2024-07-26 RX ORDER — DEXTROAMPHETAMINE SACCHARATE, AMPHETAMINE ASPARTATE MONOHYDRATE, DEXTROAMPHETAMINE SULFATE AND AMPHETAMINE SULFATE 6.25; 6.25; 6.25; 6.25 MG/1; MG/1; MG/1; MG/1
25 CAPSULE, EXTENDED RELEASE ORAL EVERY MORNING
Qty: 30 CAPSULE | Refills: 0 | Status: SHIPPED | OUTPATIENT
Start: 2024-07-26

## 2024-07-26 NOTE — TELEPHONE ENCOUNTER
Rx Refill Note  Requested Prescriptions     Pending Prescriptions Disp Refills    amphetamine-dextroamphetamine XR (ADDERALL XR) 25 MG 24 hr capsule 30 capsule 0     Sig: Take 1 capsule by mouth Every Morning      Last office visit with prescribing clinician: Visit date not found   Last telemedicine visit with prescribing clinician: Visit date not found   Next office visit with prescribing clinician: Visit date not found                         Would you like a call back once the refill request has been completed: [] Yes [] No    If the office needs to give you a call back, can they leave a voicemail: [] Yes [] No    Radha Lopez LPN  07/26/24, 11:48 EDT

## 2024-08-25 DIAGNOSIS — F90.8 ADHD, ADULT RESIDUAL TYPE: Chronic | ICD-10-CM

## 2024-08-27 RX ORDER — DEXTROAMPHETAMINE SACCHARATE, AMPHETAMINE ASPARTATE MONOHYDRATE, DEXTROAMPHETAMINE SULFATE AND AMPHETAMINE SULFATE 6.25; 6.25; 6.25; 6.25 MG/1; MG/1; MG/1; MG/1
25 CAPSULE, EXTENDED RELEASE ORAL EVERY MORNING
Qty: 30 CAPSULE | Refills: 0 | Status: SHIPPED | OUTPATIENT
Start: 2024-08-27 | End: 2024-08-29 | Stop reason: SDUPTHER

## 2024-08-29 DIAGNOSIS — F90.8 ADHD, ADULT RESIDUAL TYPE: Chronic | ICD-10-CM

## 2024-08-29 RX ORDER — DEXTROAMPHETAMINE SACCHARATE, AMPHETAMINE ASPARTATE MONOHYDRATE, DEXTROAMPHETAMINE SULFATE AND AMPHETAMINE SULFATE 6.25; 6.25; 6.25; 6.25 MG/1; MG/1; MG/1; MG/1
25 CAPSULE, EXTENDED RELEASE ORAL EVERY MORNING
Qty: 30 CAPSULE | Refills: 0 | Status: SHIPPED | OUTPATIENT
Start: 2024-08-29

## 2024-09-04 DIAGNOSIS — E03.9 HYPOTHYROIDISM, UNSPECIFIED TYPE: ICD-10-CM

## 2024-09-04 RX ORDER — LEVOTHYROXINE SODIUM 75 UG/1
75 TABLET ORAL DAILY
Qty: 90 TABLET | Refills: 1 | Status: SHIPPED | OUTPATIENT
Start: 2024-09-04

## 2024-09-17 DIAGNOSIS — E11.9 TYPE 2 DIABETES MELLITUS WITHOUT COMPLICATION, WITHOUT LONG-TERM CURRENT USE OF INSULIN: ICD-10-CM

## 2024-09-17 RX ORDER — SEMAGLUTIDE 1.34 MG/ML
1 INJECTION, SOLUTION SUBCUTANEOUS WEEKLY
Qty: 3 ML | Refills: 2 | Status: SHIPPED | OUTPATIENT
Start: 2024-09-17

## 2024-09-30 DIAGNOSIS — F90.8 ADHD, ADULT RESIDUAL TYPE: Chronic | ICD-10-CM

## 2024-09-30 NOTE — TELEPHONE ENCOUNTER
Rx Refill Note  Requested Prescriptions     Pending Prescriptions Disp Refills    amphetamine-dextroamphetamine XR (ADDERALL XR) 25 MG 24 hr capsule 30 capsule 0     Sig: Take 1 capsule by mouth Every Morning      Last office visit with prescribing clinician: 6/20/24  Last telemedicine visit with prescribing clinician: Visit date not found   Next office visit with prescribing clinician: 10/02/24    LA: 08/29/24 #30 0R                          Would you like a call back once the refill request has been completed: [] Yes [] No    If the office needs to give you a call back, can they leave a voicemail: [] Yes [] No    Hope Newell LPN  09/30/24, 14:40 EDT

## 2024-10-01 RX ORDER — DEXTROAMPHETAMINE SACCHARATE, AMPHETAMINE ASPARTATE MONOHYDRATE, DEXTROAMPHETAMINE SULFATE AND AMPHETAMINE SULFATE 6.25; 6.25; 6.25; 6.25 MG/1; MG/1; MG/1; MG/1
25 CAPSULE, EXTENDED RELEASE ORAL EVERY MORNING
Qty: 30 CAPSULE | Refills: 0 | Status: SHIPPED | OUTPATIENT
Start: 2024-10-01

## 2024-10-02 ENCOUNTER — OFFICE VISIT (OUTPATIENT)
Dept: INTERNAL MEDICINE | Facility: CLINIC | Age: 29
End: 2024-10-02
Payer: COMMERCIAL

## 2024-10-02 VITALS
HEIGHT: 69 IN | BODY MASS INDEX: 27.85 KG/M2 | WEIGHT: 188 LBS | SYSTOLIC BLOOD PRESSURE: 118 MMHG | TEMPERATURE: 97.5 F | HEART RATE: 84 BPM | DIASTOLIC BLOOD PRESSURE: 74 MMHG

## 2024-10-02 DIAGNOSIS — E11.9 TYPE 2 DIABETES MELLITUS WITHOUT COMPLICATION, WITHOUT LONG-TERM CURRENT USE OF INSULIN: ICD-10-CM

## 2024-10-02 DIAGNOSIS — F90.2 ATTENTION DEFICIT HYPERACTIVITY DISORDER (ADHD), COMBINED TYPE: Primary | ICD-10-CM

## 2024-10-02 DIAGNOSIS — I10 BENIGN ESSENTIAL HYPERTENSION: ICD-10-CM

## 2024-10-02 LAB
EXPIRATION DATE: NORMAL
HBA1C MFR BLD: 5.6 % (ref 4.5–5.7)
Lab: NORMAL

## 2024-10-02 PROCEDURE — 99214 OFFICE O/P EST MOD 30 MIN: CPT | Performed by: NURSE PRACTITIONER

## 2024-10-02 PROCEDURE — 83036 HEMOGLOBIN GLYCOSYLATED A1C: CPT | Performed by: NURSE PRACTITIONER

## 2024-10-02 NOTE — PROGRESS NOTES
Isabell Oakes  1995  [id number]  Patient Care Team:  Yamilex Khan APRN as PCP - General (Internal Medicine)  Caridad Conrad PA (Physician Assistant)    Isabell Oakes is a pleasant 28 y.o. female who presents for evaluation of Hypertension, ADD, and Diabetes    Chief Complaint   Patient presents with    Hypertension    ADD    Diabetes       HPI:   History of Present Illness  Juanjo is a pleasant female who is here for 3-month routine follow-up. She has a pertinent medical history including diabetes, hypothyroidism, hypertension, hyperlipidemia, and psoriasis. She is working full time at [employer]. (She resigned from her full-time job as a  a few years ago.) She sees a mental health provider at [facility].     Diabetes: She continues on Ozempic 1 mg and metformin 500 mg daily.  A1c remains excellent.  She has used a Continuous Glucose Monitor (CGM), which showed no significant fluctuations in her blood sugar levels. She continues to crave sugar and consumes a  cupcake nightly but avoids regular sodas and sugary drinks, opting for diet Coke instead. She exercises 2 to 3 times a week at [facility].    ADHD: She is doing well on her Adderall at current dosage.    Hypertension: Well controlled on current medication    Results      Past Medical History:   Diagnosis Date    ADHD (attention deficit hyperactivity disorder)     Anxiety     Bipolar disorder     Depression     Diabetes mellitus type II, controlled     Disease of thyroid gland     Hypertension     Hypothyroidism      Past Surgical History:   Procedure Laterality Date    TONSILLECTOMY  2001     Family History   Problem Relation Age of Onset    Diabetes Mother     Hypertension Mother     Hypertension Father     Bipolar disorder Father     Depression Father     Anxiety disorder Father     Cancer Father         Skin cancer    Arthritis Maternal Grandmother     Depression Maternal Grandmother      "Hypertension Maternal Grandmother     Hyperlipidemia Maternal Grandmother     Cancer Maternal Grandfather         melanoma?    Arthritis Paternal Grandmother     Depression Paternal Grandmother     Hypertension Paternal Grandmother     Cancer Paternal Grandfather         lung (smoker)    Heart disease Paternal Grandfather      Social History     Tobacco Use   Smoking Status Never   Smokeless Tobacco Never     No Known Allergies    Current Outpatient Medications:     amphetamine-dextroamphetamine XR (ADDERALL XR) 25 MG 24 hr capsule, Take 1 capsule by mouth Every Morning, Disp: 30 capsule, Rfl: 0    cloNIDine (CATAPRES) 0.1 MG tablet, TAKE ONE TABLET BY MOUTH ONCE NIGHTLY, Disp: 90 tablet, Rfl: 1    Continuous Glucose Sensor (FreeStyle Daisy 14 Day Sensor) misc, Use 1 Device Every 14 (Fourteen) Days., Disp: 2 each, Rfl: 2    escitalopram (LEXAPRO) 20 MG tablet, TAKE 1 TABLET BY MOUTH EVERY MORNING, Disp: 90 tablet, Rfl: 1    Tessa 1.5/30 1.5-30 MG-MCG tablet, TAKE 1 TABLET BY MOUTH DAILY, Disp: 63 tablet, Rfl: 1    levothyroxine (SYNTHROID, LEVOTHROID) 75 MCG tablet, TAKE 1 TABLET BY MOUTH DAILY, Disp: 90 tablet, Rfl: 1    MAGNESIUM PO, Take 2 capsules by mouth Every Night., Disp: , Rfl:     metFORMIN (GLUCOPHAGE) 500 MG tablet, TAKE 1 TABLET BY MOUTH DAILY, Disp: 90 tablet, Rfl: 1    Ozempic, 1 MG/DOSE, 4 MG/3ML solution pen-injector, DIAL AND INJECT UNDER THE SKIN 1 MG WEEKLY, Disp: 3 mL, Rfl: 2    Tremfya 100 MG/ML solution pen-injector, Every 3 (Three) Months., Disp: , Rfl:     tretinoin (RETIN-A) 0.05 % cream, Apply 1 Application topically to the appropriate area as directed 3 (Three) Times a Week., Disp: , Rfl:     Review of Systems  Review of systems was completed, and pertinent findings are noted in the HPI.  /74 (BP Location: Left arm, Patient Position: Sitting, Cuff Size: Adult)   Pulse 84   Temp 97.5 °F (36.4 °C) (Temporal)   Ht 174 cm (68.5\")   Wt 85.3 kg (188 lb)   BMI 28.17 kg/m² "     Physical Exam    PHQ-9 Total Score:      Assessment/Plan:  Assessment & Plan  Diabetes Mellitus.  Her A1c result is excellent. She is advised to continue her current medication regimen. She wore a continuous glucose monitor (CGM) and reported stable glucose levels, indicating good control. She is advised to monitor her sugar intake and consider healthier alternatives to processed sugars.    2. ADHD  She recently refilled her Adderall prescription.     3. HTN  Well controlled on current meds.    Follow-up  Return in 3 months for a physical examination and labs.    Patient Instructions   This patient is on a controlled substance which improves symptoms/quality of life and is aware of the risks, benefits and possible side-effects current treatment. The patient denies any medication side-effects at this time. A controlled substance agreement will be obtained or is currently on file. We reviewed required monitoring for controlled substances including but not limited to quarterly follow-up visits, annual depression screening, and urine drug screens to which the patient is agreeable. A [facility] report has been or shortly will be reviewed. There are no signs of deviation or misuse.     Plan of care reviewed with patient at the conclusion of today's visit. Education was provided regarding diagnosis, management and any prescribed or recommended OTC medications.  Patient verbalizes understanding of and agreement with management plan.    Return in about 3 months (around 1/2/2025) for Labs next visit, Annual physical.    Dictated Utilizing Dragon Dictation.    VIVIANE Rowan       Patient or patient representative verbalized consent for the use of Ambient Listening during the visit with  VIVIANE Rowan for chart documentation. 10/2/2024  12:22 EDT

## 2024-10-21 RX ORDER — NORETHINDRONE ACETATE AND ETHINYL ESTRADIOL 1.5; .03 MG/1; MG/1
1 TABLET ORAL DAILY
Qty: 63 TABLET | Refills: 1 | Status: SHIPPED | OUTPATIENT
Start: 2024-10-21

## 2024-11-04 DIAGNOSIS — F90.8 ADHD, ADULT RESIDUAL TYPE: Chronic | ICD-10-CM

## 2024-11-05 RX ORDER — DEXTROAMPHETAMINE SACCHARATE, AMPHETAMINE ASPARTATE MONOHYDRATE, DEXTROAMPHETAMINE SULFATE AND AMPHETAMINE SULFATE 6.25; 6.25; 6.25; 6.25 MG/1; MG/1; MG/1; MG/1
25 CAPSULE, EXTENDED RELEASE ORAL EVERY MORNING
Qty: 30 CAPSULE | Refills: 0 | Status: SHIPPED | OUTPATIENT
Start: 2024-11-05

## 2024-11-17 DIAGNOSIS — F41.1 GENERALIZED ANXIETY DISORDER: Chronic | ICD-10-CM

## 2024-11-17 DIAGNOSIS — F90.8 ADHD, ADULT RESIDUAL TYPE: Chronic | ICD-10-CM

## 2024-11-18 RX ORDER — CLONIDINE HYDROCHLORIDE 0.1 MG/1
0.1 TABLET ORAL NIGHTLY
Qty: 90 TABLET | Refills: 1 | Status: SHIPPED | OUTPATIENT
Start: 2024-11-18

## 2024-12-03 DIAGNOSIS — F90.8 ADHD, ADULT RESIDUAL TYPE: Chronic | ICD-10-CM

## 2024-12-03 RX ORDER — DEXTROAMPHETAMINE SACCHARATE, AMPHETAMINE ASPARTATE MONOHYDRATE, DEXTROAMPHETAMINE SULFATE AND AMPHETAMINE SULFATE 6.25; 6.25; 6.25; 6.25 MG/1; MG/1; MG/1; MG/1
25 CAPSULE, EXTENDED RELEASE ORAL EVERY MORNING
Qty: 30 CAPSULE | Refills: 0 | Status: SHIPPED | OUTPATIENT
Start: 2024-12-03

## 2024-12-03 NOTE — TELEPHONE ENCOUNTER
Rx Refill Note  Requested Prescriptions     Pending Prescriptions Disp Refills    amphetamine-dextroamphetamine XR (ADDERALL XR) 25 MG 24 hr capsule 30 capsule 0     Sig: Take 1 capsule by mouth Every Morning      Last refill:  11/5/24 #30/0  Last office visit with prescribing clinician:  10/2/24  Last telemedicine visit with prescribing clinician: Visit date not found   Next office visit with prescribing clinician: 1/22/25                        Would you like a call back once the refill request has been completed: [] Yes [] No    If the office needs to give you a call back, can they leave a voicemail: [] Yes [] No    Kathie Vilchis RN  12/03/24, 11:31 EST

## 2025-01-07 DIAGNOSIS — F90.8 ADHD, ADULT RESIDUAL TYPE: Chronic | ICD-10-CM

## 2025-01-07 NOTE — TELEPHONE ENCOUNTER
Rx Refill Note  Requested Prescriptions     Pending Prescriptions Disp Refills    amphetamine-dextroamphetamine XR (ADDERALL XR) 25 MG 24 hr capsule 30 capsule 0     Sig: Take 1 capsule by mouth Every Morning      Last refill:  12/3/24 #30/0  Last office visit with prescribing clinician: 10/2/24  Last telemedicine visit with prescribing clinician: Visit date not found   Next office visit with prescribing clinician: 1/22/25                        Would you like a call back once the refill request has been completed: [] Yes [] No    If the office needs to give you a call back, can they leave a voicemail: [] Yes [] No    Kathie Vilchis RN  01/07/25, 13:24 EST

## 2025-01-08 RX ORDER — DEXTROAMPHETAMINE SACCHARATE, AMPHETAMINE ASPARTATE MONOHYDRATE, DEXTROAMPHETAMINE SULFATE AND AMPHETAMINE SULFATE 6.25; 6.25; 6.25; 6.25 MG/1; MG/1; MG/1; MG/1
25 CAPSULE, EXTENDED RELEASE ORAL EVERY MORNING
Qty: 30 CAPSULE | Refills: 0 | Status: SHIPPED | OUTPATIENT
Start: 2025-01-08

## 2025-01-16 DIAGNOSIS — F33.0 MILD EPISODE OF RECURRENT MAJOR DEPRESSIVE DISORDER: Chronic | ICD-10-CM

## 2025-01-16 DIAGNOSIS — F41.1 GENERALIZED ANXIETY DISORDER: Chronic | ICD-10-CM

## 2025-01-16 DIAGNOSIS — E11.9 DIABETES MELLITUS, NEW ONSET: ICD-10-CM

## 2025-01-16 RX ORDER — ESCITALOPRAM OXALATE 20 MG/1
20 TABLET ORAL EVERY MORNING
Qty: 90 TABLET | Refills: 1 | Status: SHIPPED | OUTPATIENT
Start: 2025-01-16

## 2025-01-22 ENCOUNTER — TRANSCRIBE ORDERS (OUTPATIENT)
Dept: LAB | Facility: HOSPITAL | Age: 30
End: 2025-01-22
Payer: COMMERCIAL

## 2025-01-22 ENCOUNTER — OFFICE VISIT (OUTPATIENT)
Dept: INTERNAL MEDICINE | Facility: CLINIC | Age: 30
End: 2025-01-22
Payer: COMMERCIAL

## 2025-01-22 ENCOUNTER — LAB (OUTPATIENT)
Dept: LAB | Facility: HOSPITAL | Age: 30
End: 2025-01-22
Payer: COMMERCIAL

## 2025-01-22 VITALS
HEIGHT: 68 IN | TEMPERATURE: 98.4 F | DIASTOLIC BLOOD PRESSURE: 68 MMHG | WEIGHT: 199 LBS | BODY MASS INDEX: 30.16 KG/M2 | HEART RATE: 92 BPM | SYSTOLIC BLOOD PRESSURE: 136 MMHG

## 2025-01-22 DIAGNOSIS — I10 BENIGN ESSENTIAL HYPERTENSION: ICD-10-CM

## 2025-01-22 DIAGNOSIS — E55.9 VITAMIN D DEFICIENCY: ICD-10-CM

## 2025-01-22 DIAGNOSIS — F90.8 ADHD, ADULT RESIDUAL TYPE: ICD-10-CM

## 2025-01-22 DIAGNOSIS — Z00.00 ROUTINE MEDICAL EXAM: Primary | ICD-10-CM

## 2025-01-22 DIAGNOSIS — Z79.899 NEED FOR PROPHYLACTIC CHEMOTHERAPY: Primary | ICD-10-CM

## 2025-01-22 DIAGNOSIS — Z79.899 NEED FOR PROPHYLACTIC CHEMOTHERAPY: ICD-10-CM

## 2025-01-22 DIAGNOSIS — E11.9 TYPE 2 DIABETES MELLITUS WITHOUT COMPLICATION, WITHOUT LONG-TERM CURRENT USE OF INSULIN: ICD-10-CM

## 2025-01-22 LAB
ALBUMIN SERPL-MCNC: 4.1 G/DL (ref 3.5–5.2)
ALBUMIN/GLOB SERPL: 1.4 G/DL
ALP SERPL-CCNC: 85 U/L (ref 39–117)
ALT SERPL W P-5'-P-CCNC: 22 U/L (ref 1–33)
ANION GAP SERPL CALCULATED.3IONS-SCNC: 11 MMOL/L (ref 5–15)
AST SERPL-CCNC: 18 U/L (ref 1–32)
BASOPHILS # BLD AUTO: 0.06 10*3/MM3 (ref 0–0.2)
BASOPHILS NFR BLD AUTO: 0.6 % (ref 0–1.5)
BILIRUB SERPL-MCNC: 0.3 MG/DL (ref 0–1.2)
BUN SERPL-MCNC: 13 MG/DL (ref 6–20)
BUN/CREAT SERPL: 16.9 (ref 7–25)
CALCIUM SPEC-SCNC: 9.3 MG/DL (ref 8.6–10.5)
CHLORIDE SERPL-SCNC: 103 MMOL/L (ref 98–107)
CHOLEST SERPL-MCNC: 167 MG/DL (ref 0–200)
CO2 SERPL-SCNC: 23 MMOL/L (ref 22–29)
CREAT SERPL-MCNC: 0.77 MG/DL (ref 0.57–1)
DEPRECATED RDW RBC AUTO: 39.4 FL (ref 37–54)
EGFRCR SERPLBLD CKD-EPI 2021: 107.9 ML/MIN/1.73
EOSINOPHIL # BLD AUTO: 0.13 10*3/MM3 (ref 0–0.4)
EOSINOPHIL NFR BLD AUTO: 1.4 % (ref 0.3–6.2)
ERYTHROCYTE [DISTWIDTH] IN BLOOD BY AUTOMATED COUNT: 13 % (ref 12.3–15.4)
GLOBULIN UR ELPH-MCNC: 2.9 GM/DL
GLUCOSE SERPL-MCNC: 68 MG/DL (ref 65–99)
HBA1C MFR BLD: 5.9 % (ref 4.8–5.6)
HCT VFR BLD AUTO: 38.9 % (ref 34–46.6)
HDLC SERPL-MCNC: 47 MG/DL (ref 40–60)
HGB BLD-MCNC: 13.5 G/DL (ref 12–15.9)
IMM GRANULOCYTES # BLD AUTO: 0.02 10*3/MM3 (ref 0–0.05)
IMM GRANULOCYTES NFR BLD AUTO: 0.2 % (ref 0–0.5)
LDLC SERPL CALC-MCNC: 109 MG/DL (ref 0–100)
LDLC/HDLC SERPL: 2.31 {RATIO}
LYMPHOCYTES # BLD AUTO: 3.68 10*3/MM3 (ref 0.7–3.1)
LYMPHOCYTES NFR BLD AUTO: 38.3 % (ref 19.6–45.3)
MCH RBC QN AUTO: 29 PG (ref 26.6–33)
MCHC RBC AUTO-ENTMCNC: 34.7 G/DL (ref 31.5–35.7)
MCV RBC AUTO: 83.7 FL (ref 79–97)
MONOCYTES # BLD AUTO: 0.47 10*3/MM3 (ref 0.1–0.9)
MONOCYTES NFR BLD AUTO: 4.9 % (ref 5–12)
NEUTROPHILS NFR BLD AUTO: 5.24 10*3/MM3 (ref 1.7–7)
NEUTROPHILS NFR BLD AUTO: 54.6 % (ref 42.7–76)
NRBC BLD AUTO-RTO: 0 /100 WBC (ref 0–0.2)
PLATELET # BLD AUTO: 339 10*3/MM3 (ref 140–450)
PMV BLD AUTO: 10.8 FL (ref 6–12)
POTASSIUM SERPL-SCNC: 4.3 MMOL/L (ref 3.5–5.2)
PROT SERPL-MCNC: 7 G/DL (ref 6–8.5)
RBC # BLD AUTO: 4.65 10*6/MM3 (ref 3.77–5.28)
SODIUM SERPL-SCNC: 137 MMOL/L (ref 136–145)
TRIGL SERPL-MCNC: 57 MG/DL (ref 0–150)
TSH SERPL DL<=0.05 MIU/L-ACNC: 1.9 UIU/ML (ref 0.27–4.2)
VLDLC SERPL-MCNC: 11 MG/DL (ref 5–40)
WBC NRBC COR # BLD AUTO: 9.6 10*3/MM3 (ref 3.4–10.8)

## 2025-01-22 PROCEDURE — 82607 VITAMIN B-12: CPT

## 2025-01-22 PROCEDURE — 80061 LIPID PANEL: CPT

## 2025-01-22 PROCEDURE — 82306 VITAMIN D 25 HYDROXY: CPT

## 2025-01-22 PROCEDURE — 99395 PREV VISIT EST AGE 18-39: CPT | Performed by: NURSE PRACTITIONER

## 2025-01-22 PROCEDURE — 83036 HEMOGLOBIN GLYCOSYLATED A1C: CPT

## 2025-01-22 PROCEDURE — 80050 GENERAL HEALTH PANEL: CPT

## 2025-01-22 PROCEDURE — 86480 TB TEST CELL IMMUN MEASURE: CPT

## 2025-01-22 PROCEDURE — 36415 COLL VENOUS BLD VENIPUNCTURE: CPT

## 2025-01-22 RX ORDER — GUSELKUMAB 100 MG/ML
1 INJECTION SUBCUTANEOUS
COMMUNITY
Start: 2024-12-12

## 2025-01-22 RX ORDER — SEMAGLUTIDE 1.34 MG/ML
1 INJECTION, SOLUTION SUBCUTANEOUS WEEKLY
Qty: 3 ML | Refills: 2 | Status: SHIPPED | OUTPATIENT
Start: 2025-01-22

## 2025-01-22 NOTE — PROGRESS NOTES
Isabell Oakes  1995  [id number]  Patient Care Team:  Yamilex Khan APRN as PCP - General (Internal Medicine)  Caridad Conrad PA (Physician Assistant)    Isabell Oakes is a 29 y.o. pleasant female here today for annual physical.  Chief Complaint   Patient presents with    Annual Exam       HPI:   History of Present Illness  Juanjo is a 29-year-old female who presents for a routine physical.  She has a pertinent medical history including diabetes, hypothyroidism, hypertension, hyperlipidemia, and psoriasis. She is working full time at [employer]. She sees a counselor twice a month.    She acknowledges a recent weight gain, which she attributes to inconsistent use of Ozempic over the past few months. Despite this, she has maintained a regular exercise regimen, engaging in physical activity 3 to 4 times per week. She resumed consistent Ozempic use last week. She has not been monitoring her blood glucose levels since her last Dexcom use in October.She continues to take metformin 500 mg daily.     She is not currently under the care of a behavioral health provider. She is feeling well on current dose of lexapro. ADHD sx are managed by adderall XR 25 mg daily. Therapist every other week at [facility].    She plans to see a new gynecologist. She continues on oral SUSSY. She reports no current gynecological issues.     She has not received influenza or COVID-19 vaccines. She had a dermatology appointment in December 2024, where no new issues were identified. S    he reports no symptoms of reflux or heartbur. She also reports no abdominal pain, constipation, or diarrhea, attributing her regular bowel movements to her nightly magnesium intake.         Past Medical History:   Diagnosis Date    ADHD (attention deficit hyperactivity disorder)     Anxiety     Bipolar disorder     Depression     Diabetes mellitus type II, controlled     Disease of thyroid gland     Hypertension      Hypothyroidism      Past Surgical History:   Procedure Laterality Date    TONSILLECTOMY  2001     Family History   Problem Relation Age of Onset    Diabetes Mother     Hypertension Mother     Hypertension Father     Bipolar disorder Father     Depression Father     Anxiety disorder Father     Cancer Father         Skin cancer    Arthritis Maternal Grandmother     Depression Maternal Grandmother     Hypertension Maternal Grandmother     Hyperlipidemia Maternal Grandmother     Cancer Maternal Grandfather         melanoma?    Arthritis Paternal Grandmother     Depression Paternal Grandmother     Hypertension Paternal Grandmother     Cancer Paternal Grandfather         lung (smoker)    Heart disease Paternal Grandfather      Social History     Tobacco Use   Smoking Status Never   Smokeless Tobacco Never     No Known Allergies    Current Outpatient Medications:     amphetamine-dextroamphetamine XR (ADDERALL XR) 25 MG 24 hr capsule, Take 1 capsule by mouth Every Morning, Disp: 30 capsule, Rfl: 0    Continuous Glucose Sensor (FreeStyle Daisy 14 Day Sensor) misc, Use 1 Device Every 14 (Fourteen) Days., Disp: 2 each, Rfl: 2    escitalopram (LEXAPRO) 20 MG tablet, TAKE 1 TABLET BY MOUTH EVERY MORNING, Disp: 90 tablet, Rfl: 1    Tessa 1.5/30 1.5-30 MG-MCG tablet, TAKE 1 TABLET BY MOUTH DAILY, Disp: 63 tablet, Rfl: 1    levothyroxine (SYNTHROID, LEVOTHROID) 75 MCG tablet, TAKE 1 TABLET BY MOUTH DAILY, Disp: 90 tablet, Rfl: 1    MAGNESIUM PO, Take 2 capsules by mouth Every Night., Disp: , Rfl:     metFORMIN (GLUCOPHAGE) 500 MG tablet, Take 1 tablet by mouth Daily., Disp: 90 tablet, Rfl: 1    Semaglutide, 1 MG/DOSE, (Ozempic, 1 MG/DOSE,) 4 MG/3ML solution pen-injector, Inject 1 mg under the skin into the appropriate area as directed 1 (One) Time Per Week., Disp: 3 mL, Rfl: 2    Tremfya 100 MG/ML solution auto-injector, Inject 1 mL under the skin into the appropriate area as directed Every 3 (Three) Months., Disp: , Rfl:      "tretinoin (RETIN-A) 0.05 % cream, Apply 1 Application topically to the appropriate area as directed 3 (Three) Times a Week., Disp: , Rfl:     Review of Systems    Review of systems was completed, and pertinent findings are noted in the HPI.    /68 (BP Location: Left arm, Patient Position: Sitting, Cuff Size: Adult)   Pulse 92   Temp 98.4 °F (36.9 °C) (Temporal)   Ht 171.5 cm (67.5\")   Wt 90.3 kg (199 lb)   BMI 30.71 kg/m²     Physical Exam  Vitals reviewed.   Constitutional:       Appearance: Normal appearance. She is well-developed. She is obese.   HENT:      Head: Normocephalic.      Right Ear: External ear normal.      Left Ear: External ear normal.   Eyes:      Conjunctiva/sclera: Conjunctivae normal.      Pupils: Pupils are equal, round, and reactive to light.   Cardiovascular:      Rate and Rhythm: Normal rate and regular rhythm.      Pulses: Normal pulses.      Heart sounds: Normal heart sounds.   Pulmonary:      Effort: Pulmonary effort is normal.      Breath sounds: Normal breath sounds.   Abdominal:      General: Abdomen is flat. Bowel sounds are normal.      Palpations: Abdomen is soft.   Musculoskeletal:         General: Normal range of motion.      Cervical back: Normal range of motion and neck supple.   Skin:     General: Skin is warm and dry.   Neurological:      Mental Status: She is alert and oriented to person, place, and time.   Psychiatric:         Mood and Affect: Mood normal.         Behavior: Behavior normal.         Thought Content: Thought content normal.         Judgment: Judgment normal.         Results      PHQ-9 Total Score:           Assessment/Plan:  Assessment & Plan  1. Diabetes Mellitus.  A prescription refill for Ozempic 1 mg will be provided. She is advised to maintain her current medication regimen, including metformin 500 mg daily, and continue her exercise routine. Will return for fasting labs. Recommend yearly eye exams.     2. ADHD.  Continue adderall and follow up " in 3mo.     3. Depression.  Continue Lexapro 20 mg and visits with therapist every other week at [facility].    4. Hypothyroidism.  She is currently on levothyroxine 75 mcg. Will return for labs.     5. Health Maintenance.  She declines flu or COVID-19 vaccines. She is advised to establish care with a gynecologist.    Follow-up  The patient will follow up in 3 months.    Patient Instructions       Counseling/Anticipatory Guidance:   Plan of care reviewed with patient at the conclusion of today's visit. Education was provided in regards to diagnosis, diet and exercise, cervical cancer screening, self breast exams, breast cancer screening, and the importance of yearly mammograms.   Nutrition, family planning/contraception, physical activity, healthy weight,ways to reduce stress, adequate sleep, injury prevention, misuse of tobacco, alcohol and drugs, sexual behavior and STD's, dental health, mental health, and immunizations.    Management and any prescribed or recommended OTC medications.  Patient verbalizes understanding of and agreement with management plan.    Return in about 3 months (around 4/22/2025) for Video Visit.    Dictated Utilizing Dragon Dictation.      VIVIANE Rowan  Patient or patient representative verbalized consent for the use of Ambient Listening during the visit with  VIVIANE Rowan for chart documentation. 1/22/2025  14:52 EST

## 2025-01-22 NOTE — PATIENT INSTRUCTIONS
This patient is on a controlled substance which improves symptoms/quality of life and is aware of the risks, benefits and possible side-effects current treatment. The patient denies any medication side-effects at this time. A controlled substance agreement will be obtained or is currently on file. We reviewed required monitoring for controlled substances including but not limited to quarterly follow-up visits, annual depression screening, and urine drug screens to which the patient is agreeable. A [facility] report has been or shortly will be reviewed. There are no signs of deviation or misuse.   Preventive Care 21-39 Years Old, Female  Preventive care refers to lifestyle choices and visits with your health care provider that can promote health and wellness. Preventive care visits are also called wellness exams.  What can I expect for my preventive care visit?  Counseling  During your preventive care visit, your health care provider may ask about your:  Medical history, including:  Past medical problems.  Family medical history.  Pregnancy history.  Current health, including:  Menstrual cycle.  Method of birth control.  Emotional well-being.  Home life and relationship well-being.  Sexual activity and sexual health.  Lifestyle, including:  Alcohol, nicotine or tobacco, and drug use.  Access to firearms.  Diet, exercise, and sleep habits.  Work and work environment.  Sunscreen use.  Safety issues such as seatbelt and bike helmet use.  Physical exam  Your health care provider may check your:  Height and weight. These may be used to calculate your BMI (body mass index). BMI is a measurement that tells if you are at a healthy weight.  Waist circumference. This measures the distance around your waistline. This measurement also tells if you are at a healthy weight and may help predict your risk of certain diseases, such as type 2 diabetes and high blood pressure.  Heart rate and blood pressure.  Body temperature.  Skin for  abnormal spots.  What immunizations do I need?    Vaccines are usually given at various ages, according to a schedule. Your health care provider will recommend vaccines for you based on your age, medical history, and lifestyle or other factors, such as travel or where you work.  What tests do I need?  Screening  Your health care provider may recommend screening tests for certain conditions. This may include:  Pelvic exam and Pap test.  Lipid and cholesterol levels.  Diabetes screening. This is done by checking your blood sugar (glucose) after you have not eaten for a while (fasting).  Hepatitis B test.  Hepatitis C test.  HIV (human immunodeficiency virus) test.  STI (sexually transmitted infection) testing, if you are at risk.  BRCA-related cancer screening. This may be done if you have a family history of breast, ovarian, tubal, or peritoneal cancers.  Talk with your health care provider about your test results, treatment options, and if necessary, the need for more tests.  Follow these instructions at home:  Eating and drinking    Eat a healthy diet that includes fresh fruits and vegetables, whole grains, lean protein, and low-fat dairy products.  Take vitamin and mineral supplements as recommended by your health care provider.  Do not drink alcohol if:  Your health care provider tells you not to drink.  You are pregnant, may be pregnant, or are planning to become pregnant.  If you drink alcohol:  Limit how much you have to 0-1 drink a day.  Know how much alcohol is in your drink. In the [place] one drink equals one 12 oz bottle of beer (355 mL), one 5 oz glass of wine (148 mL), or one 1½ oz glass of hard liquor (44 mL).  Lifestyle  Brush your teeth every morning and night with fluoride toothpaste. Floss one time each day.  Exercise for at least 30 minutes 5 or more days each week.  Do not use any products that contain nicotine or tobacco. These products include cigarettes, chewing tobacco, and vaping devices, such  as e-cigarettes. If you need help quitting, ask your health care provider.  Do not use drugs.  If you are sexually active, practice safe sex. Use a condom or other form of protection to prevent STIs.  If you do not wish to become pregnant, use a form of birth control. If you plan to become pregnant, see your health care provider for a prepregnancy visit.  Find healthy ways to manage stress, such as:  Meditation, yoga, or listening to music.  Journaling.  Talking to a trusted person.  Spending time with friends and family.  Minimize exposure to UV radiation to reduce your risk of skin cancer.  Safety  Always wear your seat belt while driving or riding in a vehicle.  Do not drive:  If you have been drinking alcohol. Do not ride with someone who has been drinking.  If you have been using any mind-altering substances or drugs.  While texting.  When you are tired or distracted.  Wear a helmet and other protective equipment during sports activities.  If you have firearms in your house, make sure you follow all gun safety procedures.  Seek help if you have been physically or sexually abused.  What's next?  Go to your health care provider once a year for an annual wellness visit.  Ask your health care provider how often you should have your eyes and teeth checked.  Stay up to date on all vaccines.  This information is not intended to replace advice given to you by your health care provider. Make sure you discuss any questions you have with your health care provider.  Document Revised: 06/15/2022 Document Reviewed: 06/15/2022  [employer] Patient Education © 2024 [employer]

## 2025-01-23 LAB
25(OH)D3 SERPL-MCNC: 58.6 NG/ML (ref 30–100)
VIT B12 BLD-MCNC: 320 PG/ML (ref 211–946)

## 2025-01-24 DIAGNOSIS — E03.9 HYPOTHYROIDISM, UNSPECIFIED TYPE: ICD-10-CM

## 2025-01-24 RX ORDER — LEVOTHYROXINE SODIUM 75 UG/1
75 TABLET ORAL DAILY
Qty: 90 TABLET | Refills: 1 | Status: SHIPPED | OUTPATIENT
Start: 2025-01-24

## 2025-01-25 LAB
GAMMA INTERFERON BACKGROUND BLD IA-ACNC: 0.04 IU/ML
M TB IFN-G BLD-IMP: NEGATIVE
M TB IFN-G CD4+ BCKGRND COR BLD-ACNC: 0.09 IU/ML
M TB IFN-G CD4+CD8+ BCKGRND COR BLD-ACNC: 0.06 IU/ML
MITOGEN IGNF BCKGRD COR BLD-ACNC: >10 IU/ML
QUANTIFERON INCUBATION: NORMAL
SERVICE CMNT-IMP: NORMAL

## 2025-02-03 DIAGNOSIS — F90.8 ADHD, ADULT RESIDUAL TYPE: Chronic | ICD-10-CM

## 2025-02-03 RX ORDER — DEXTROAMPHETAMINE SACCHARATE, AMPHETAMINE ASPARTATE MONOHYDRATE, DEXTROAMPHETAMINE SULFATE AND AMPHETAMINE SULFATE 6.25; 6.25; 6.25; 6.25 MG/1; MG/1; MG/1; MG/1
25 CAPSULE, EXTENDED RELEASE ORAL EVERY MORNING
Qty: 30 CAPSULE | Refills: 0 | Status: SHIPPED | OUTPATIENT
Start: 2025-02-03

## 2025-02-03 NOTE — TELEPHONE ENCOUNTER
Rx Refill Note  Requested Prescriptions     Pending Prescriptions Disp Refills    amphetamine-dextroamphetamine XR (ADDERALL XR) 25 MG 24 hr capsule 30 capsule 0     Sig: Take 1 capsule by mouth Every Morning      Last office visit with prescribing clinician: Visit date not found   Last telemedicine visit with prescribing clinician: Visit date not found   Next office visit with prescribing clinician: Visit date not found                         Would you like a call back once the refill request has been completed: [] Yes [] No    If the office needs to give you a call back, can they leave a voicemail: [] Yes [] No    Bhumika Morrison MA  02/03/25, 09:20 EST

## 2025-02-24 RX ORDER — NORETHINDRONE ACETATE AND ETHINYL ESTRADIOL 1.5; .03 MG/1; MG/1
1 TABLET ORAL DAILY
Qty: 63 TABLET | Refills: 1 | Status: SHIPPED | OUTPATIENT
Start: 2025-02-24

## 2025-03-02 DIAGNOSIS — E03.9 HYPOTHYROIDISM, UNSPECIFIED TYPE: ICD-10-CM

## 2025-03-03 RX ORDER — LEVOTHYROXINE SODIUM 75 UG/1
75 TABLET ORAL DAILY
Qty: 90 TABLET | Refills: 1 | Status: SHIPPED | OUTPATIENT
Start: 2025-03-03

## 2025-03-08 DIAGNOSIS — F90.8 ADHD, ADULT RESIDUAL TYPE: Chronic | ICD-10-CM

## 2025-03-10 NOTE — TELEPHONE ENCOUNTER
Rx Refill Note  Requested Prescriptions     Pending Prescriptions Disp Refills    amphetamine-dextroamphetamine XR (ADDERALL XR) 25 MG 24 hr capsule 30 capsule 0     Sig: Take 1 capsule by mouth Every Morning      Last office visit with prescribing clinician: Visit date not found   Last telemedicine visit with prescribing clinician: Visit date not found   Next office visit with prescribing clinician: Visit date not found                         Would you like a call back once the refill request has been completed: [] Yes [] No    If the office needs to give you a call back, can they leave a voicemail: [] Yes [] No    Randi Deutsch MA  03/10/25, 08:05 EDT

## 2025-03-11 RX ORDER — DEXTROAMPHETAMINE SACCHARATE, AMPHETAMINE ASPARTATE MONOHYDRATE, DEXTROAMPHETAMINE SULFATE AND AMPHETAMINE SULFATE 6.25; 6.25; 6.25; 6.25 MG/1; MG/1; MG/1; MG/1
25 CAPSULE, EXTENDED RELEASE ORAL EVERY MORNING
Qty: 30 CAPSULE | Refills: 0 | Status: SHIPPED | OUTPATIENT
Start: 2025-03-11

## 2025-04-08 DIAGNOSIS — F90.8 ADHD, ADULT RESIDUAL TYPE: Chronic | ICD-10-CM

## 2025-04-08 RX ORDER — DEXTROAMPHETAMINE SACCHARATE, AMPHETAMINE ASPARTATE MONOHYDRATE, DEXTROAMPHETAMINE SULFATE AND AMPHETAMINE SULFATE 6.25; 6.25; 6.25; 6.25 MG/1; MG/1; MG/1; MG/1
25 CAPSULE, EXTENDED RELEASE ORAL EVERY MORNING
Qty: 30 CAPSULE | Refills: 0 | Status: SHIPPED | OUTPATIENT
Start: 2025-04-08

## 2025-04-08 NOTE — TELEPHONE ENCOUNTER
Rx Refill Note  Requested Prescriptions     Pending Prescriptions Disp Refills    amphetamine-dextroamphetamine XR (ADDERALL XR) 25 MG 24 hr capsule 30 capsule 0     Sig: Take 1 capsule by mouth Every Morning      Last office visit with prescribing clinician: Visit date not found   Last telemedicine visit with prescribing clinician: Visit date not found   Next office visit with prescribing clinician: Visit date not found                         Would you like a call back once the refill request has been completed: [] Yes [] No    If the office needs to give you a call back, can they leave a voicemail: [] Yes [] No    Radha Lopez LPN  04/08/25, 11:16 EDT

## 2025-05-07 ENCOUNTER — TELEMEDICINE (OUTPATIENT)
Dept: INTERNAL MEDICINE | Facility: CLINIC | Age: 30
End: 2025-05-07
Payer: COMMERCIAL

## 2025-05-07 DIAGNOSIS — E03.9 HYPOTHYROIDISM, UNSPECIFIED TYPE: ICD-10-CM

## 2025-05-07 DIAGNOSIS — F90.2 ATTENTION DEFICIT HYPERACTIVITY DISORDER (ADHD), COMBINED TYPE: Primary | ICD-10-CM

## 2025-05-07 PROCEDURE — 99213 OFFICE O/P EST LOW 20 MIN: CPT | Performed by: NURSE PRACTITIONER

## 2025-05-07 NOTE — PROGRESS NOTES
Telehealth Visit     Date: 2025   Patient Name: Isabell Oakes  : 1995   MRN: [id number]     Chief Complaint:  No chief complaint on file.      This provider is located at the [facility] in [place], [place]. The patient is being seen remotely via telehealth at their home address in [place], and stated they are in a secure environment for this session. The patient's condition being diagnosed/treated is appropriate for telemedicine. The provider identified herself as well as her credentials. The patient, and/or patients guardian, consent to be seen remotely, and when consent is given they understand that the consent allows for patient identifiable information to be sent to a third party as needed. They may refuse to be seen remotely at any time. The electronic data is encrypted and password protected, and the patient and/or guardian has been advised of the potential risks to privacy not withstanding such measures.    You have chosen to receive care through a telehealth visit. Do you consent to use a video/audio connection for your medical care today? Yes    History of Present Illness:   History of Present Illness  Juanjo is a 29-year-old female who presents for a routine physical.  She has a pertinent medical history including diabetes, hypothyroidism, hypertension, hyperlipidemia, and psoriasis. She is working full time at [employer]. She sees a counselor twice a month. This is her compliance visit for ADHD refills. She is doing well on Adderall 25mg XR daily.       Subjective     Review of systems was completed, and pertinent findings are noted in the HPI.    I have reviewed and the following portions of the patient's history were updated as appropriate: past family history, past medical history, past social history, past surgical history and problem list.    Past Medical History:   Diagnosis Date    ADHD (attention deficit hyperactivity disorder)     Anxiety      Bipolar disorder     Depression     Diabetes mellitus type II, controlled     Disease of thyroid gland     Hypertension     Hypothyroidism        Past Surgical History:   Procedure Laterality Date    TONSILLECTOMY  2001       Social History     Socioeconomic History    Marital status:    Tobacco Use    Smoking status: Never    Smokeless tobacco: Never   Vaping Use    Vaping status: Never Used   Substance and Sexual Activity    Alcohol use: Not Currently    Drug use: Never    Sexual activity: Yes     Partners: Male     Birth control/protection: Condom, Birth control pill, Pill         Current Outpatient Medications:     amphetamine-dextroamphetamine XR (ADDERALL XR) 25 MG 24 hr capsule, Take 1 capsule by mouth Every Morning, Disp: 30 capsule, Rfl: 0    Continuous Glucose Sensor (FreeStyle Daisy 14 Day Sensor) misc, Use 1 Device Every 14 (Fourteen) Days., Disp: 2 each, Rfl: 2    escitalopram (LEXAPRO) 20 MG tablet, TAKE 1 TABLET BY MOUTH EVERY MORNING, Disp: 90 tablet, Rfl: 1    Tessa 1.5/30 1.5-30 MG-MCG tablet, TAKE 1 TABLET BY MOUTH DAILY, Disp: 63 tablet, Rfl: 1    levothyroxine (SYNTHROID, LEVOTHROID) 75 MCG tablet, TAKE 1 TABLET BY MOUTH DAILY, Disp: 90 tablet, Rfl: 1    MAGNESIUM PO, Take 2 capsules by mouth Every Night., Disp: , Rfl:     metFORMIN (GLUCOPHAGE) 500 MG tablet, Take 1 tablet by mouth Daily., Disp: 90 tablet, Rfl: 1    Semaglutide, 1 MG/DOSE, (Ozempic, 1 MG/DOSE,) 4 MG/3ML solution pen-injector, Inject 1 mg under the skin into the appropriate area as directed 1 (One) Time Per Week., Disp: 3 mL, Rfl: 2    Tremfya 100 MG/ML solution auto-injector, Inject 1 mL under the skin into the appropriate area as directed Every 3 (Three) Months., Disp: , Rfl:     tretinoin (RETIN-A) 0.05 % cream, Apply 1 Application topically to the appropriate area as directed 3 (Three) Times a Week., Disp: , Rfl:     Objective     Physical Exam:  Vital Signs: There were no vitals filed for this visit.  There is  no height or weight on file to calculate BMI.    Physical Exam  Vitals reviewed.   Constitutional:       Appearance: She is well-developed.   Pulmonary:      Effort: Pulmonary effort is normal.   Neurological:      Mental Status: She is alert.   Psychiatric:         Behavior: Behavior normal.         Assessment / Plan      Diagnoses and all orders for this visit:    1. Attention deficit hyperactivity disorder (ADHD), combined type (Primary)  Comments:  Continue Adderall xr daily.    2. Hypothyroidism, unspecified type  Comments:  repeat TSH and continue levothyroxine  Orders:  -     TSH Rfx On Abnormal To Free T4; Future  -     CBC & Differential; Future       Assessment & Plan  Diagnoses and all orders for this visit:    1. Attention deficit hyperactivity disorder (ADHD), combined type (Primary)  Comments:  Continue Adderall xr daily.    2. Hypothyroidism, unspecified type  Comments:  repeat TSH and continue levothyroxine  Orders:  -     TSH Rfx On Abnormal To Free T4; Future  -     CBC & Differential; Future             Follow Up:   No follow-ups on file.    Any medications prescribed have been sent electronically to   [facility] [id number] - [place], [place] - [address] AT [facility] - [phone] PH - [phone] FX  [address]  [address]  [place] [id number]  Phone: [phone] Fax: [phone]    [facility] [id number] - [place], [place] - [address] [facility] [address] AT [facility] - [phone] PH - [phone] FX  [address] [place] [address]  Phone: [phone] Fax: [phone]    [facility] [id number] - [place], [place] - [address] - [phone] PH - [phone] FX  [address] [facility] [address]  Phone: [phone] Fax: [phone]    [facility] [id number] - [place], [place] - [address] AT [facility] [address] - [phone] PH - [phone] FX  [address]  [address]  [place] [id number]  Phone: [phone] Fax: [phone]    [facility]  [id number] - [place], [place] - [address] - [phone]  - [phone]   [address]  [facility] [address]  Phone: [phone] Fax: [phone]      VIVIANE Rowan  Patient or patient representative verbalized consent for the use of Ambient Listening during the visit with  VIVIANE oRwan for chart documentation. 5/12/2025  07:27 EDT

## 2025-05-10 DIAGNOSIS — F90.8 ADHD, ADULT RESIDUAL TYPE: Chronic | ICD-10-CM

## 2025-05-10 RX ORDER — DEXTROAMPHETAMINE SACCHARATE, AMPHETAMINE ASPARTATE MONOHYDRATE, DEXTROAMPHETAMINE SULFATE AND AMPHETAMINE SULFATE 6.25; 6.25; 6.25; 6.25 MG/1; MG/1; MG/1; MG/1
25 CAPSULE, EXTENDED RELEASE ORAL EVERY MORNING
Qty: 30 CAPSULE | Refills: 0 | Status: CANCELLED | OUTPATIENT
Start: 2025-05-10

## 2025-05-12 ENCOUNTER — PATIENT MESSAGE (OUTPATIENT)
Dept: INTERNAL MEDICINE | Facility: CLINIC | Age: 30
End: 2025-05-12
Payer: COMMERCIAL

## 2025-05-12 DIAGNOSIS — F90.8 ADHD, ADULT RESIDUAL TYPE: Chronic | ICD-10-CM

## 2025-05-12 NOTE — TELEPHONE ENCOUNTER
Rx Refill Note  Requested Prescriptions     Pending Prescriptions Disp Refills    amphetamine-dextroamphetamine XR (ADDERALL XR) 25 MG 24 hr capsule 30 capsule 0     Sig: Take 1 capsule by mouth Every Morning      Last refill:  4/8/25 #30/0  Last office visit with prescribing clinician: 1/22/2025   Last telemedicine visit with prescribing clinician: 5/7/2025   Next office visit with prescribing clinician: Visit date not found                         Would you like a call back once the refill request has been completed: [] Yes [] No    If the office needs to give you a call back, can they leave a voicemail: [] Yes [] No    Kathie Vilchis RN  05/12/25, 13:26 EDT

## 2025-05-12 NOTE — TELEPHONE ENCOUNTER
Rx Refill Note  Requested Prescriptions     Pending Prescriptions Disp Refills    amphetamine-dextroamphetamine XR (ADDERALL XR) 25 MG 24 hr capsule 30 capsule 0     Sig: Take 1 capsule by mouth Every Morning      Last office visit with prescribing clinician: Visit date not found   Last telemedicine visit with prescribing clinician: 5/7/2025   Next office visit with prescribing clinician: Visit date not found                         Would you like a call back once the refill request has been completed: [] Yes [] No    If the office needs to give you a call back, can they leave a voicemail: [] Yes [] No    Christen Vilchis MA  05/12/25, 08:46 EDT

## 2025-05-13 RX ORDER — DEXTROAMPHETAMINE SACCHARATE, AMPHETAMINE ASPARTATE MONOHYDRATE, DEXTROAMPHETAMINE SULFATE AND AMPHETAMINE SULFATE 6.25; 6.25; 6.25; 6.25 MG/1; MG/1; MG/1; MG/1
25 CAPSULE, EXTENDED RELEASE ORAL EVERY MORNING
Qty: 30 CAPSULE | Refills: 0 | Status: SHIPPED | OUTPATIENT
Start: 2025-05-13

## 2025-05-29 DIAGNOSIS — F90.8 ADHD, ADULT RESIDUAL TYPE: Chronic | ICD-10-CM

## 2025-05-29 RX ORDER — DEXTROAMPHETAMINE SACCHARATE, AMPHETAMINE ASPARTATE MONOHYDRATE, DEXTROAMPHETAMINE SULFATE AND AMPHETAMINE SULFATE 6.25; 6.25; 6.25; 6.25 MG/1; MG/1; MG/1; MG/1
25 CAPSULE, EXTENDED RELEASE ORAL EVERY MORNING
Qty: 30 CAPSULE | Refills: 0 | OUTPATIENT
Start: 2025-05-29

## 2025-05-29 NOTE — TELEPHONE ENCOUNTER
Rx Refill Note  Requested Prescriptions     Pending Prescriptions Disp Refills    amphetamine-dextroamphetamine XR (ADDERALL XR) 25 MG 24 hr capsule 30 capsule 0     Sig: Take 1 capsule by mouth Every Morning      Last office visit with prescribing clinician: Visit date not found   Last telemedicine visit with prescribing clinician: 5/7/2025   Next office visit with prescribing clinician: Visit date not found                         Would you like a call back once the refill request has been completed: [] Yes [] No    If the office needs to give you a call back, can they leave a voicemail: [] Yes [] No    Radha Lopez LPN  05/29/25, 12:58 EDT

## 2025-06-02 ENCOUNTER — LAB (OUTPATIENT)
Dept: LAB | Facility: HOSPITAL | Age: 30
End: 2025-06-02
Payer: COMMERCIAL

## 2025-06-02 DIAGNOSIS — E03.9 HYPOTHYROIDISM, UNSPECIFIED TYPE: ICD-10-CM

## 2025-06-02 LAB
BASOPHILS # BLD AUTO: 0.05 10*3/MM3 (ref 0–0.2)
BASOPHILS NFR BLD AUTO: 0.4 % (ref 0–1.5)
DEPRECATED RDW RBC AUTO: 39.1 FL (ref 37–54)
EOSINOPHIL # BLD AUTO: 0.1 10*3/MM3 (ref 0–0.4)
EOSINOPHIL NFR BLD AUTO: 0.8 % (ref 0.3–6.2)
ERYTHROCYTE [DISTWIDTH] IN BLOOD BY AUTOMATED COUNT: 12.5 % (ref 12.3–15.4)
HCT VFR BLD AUTO: 42.7 % (ref 34–46.6)
HGB BLD-MCNC: 13.8 G/DL (ref 12–15.9)
IMM GRANULOCYTES # BLD AUTO: 0.05 10*3/MM3 (ref 0–0.05)
IMM GRANULOCYTES NFR BLD AUTO: 0.4 % (ref 0–0.5)
LYMPHOCYTES # BLD AUTO: 3.25 10*3/MM3 (ref 0.7–3.1)
LYMPHOCYTES NFR BLD AUTO: 25.4 % (ref 19.6–45.3)
MCH RBC QN AUTO: 28.3 PG (ref 26.6–33)
MCHC RBC AUTO-ENTMCNC: 32.3 G/DL (ref 31.5–35.7)
MCV RBC AUTO: 87.5 FL (ref 79–97)
MONOCYTES # BLD AUTO: 0.65 10*3/MM3 (ref 0.1–0.9)
MONOCYTES NFR BLD AUTO: 5.1 % (ref 5–12)
NEUTROPHILS NFR BLD AUTO: 67.9 % (ref 42.7–76)
NEUTROPHILS NFR BLD AUTO: 8.7 10*3/MM3 (ref 1.7–7)
NRBC BLD AUTO-RTO: 0 /100 WBC (ref 0–0.2)
PLATELET # BLD AUTO: 371 10*3/MM3 (ref 140–450)
PMV BLD AUTO: 10.6 FL (ref 6–12)
RBC # BLD AUTO: 4.88 10*6/MM3 (ref 3.77–5.28)
TSH SERPL DL<=0.05 MIU/L-ACNC: 4.12 UIU/ML (ref 0.27–4.2)
WBC NRBC COR # BLD AUTO: 12.8 10*3/MM3 (ref 3.4–10.8)

## 2025-06-02 PROCEDURE — 84443 ASSAY THYROID STIM HORMONE: CPT

## 2025-06-02 PROCEDURE — 85025 COMPLETE CBC W/AUTO DIFF WBC: CPT

## 2025-06-05 ENCOUNTER — TELEPHONE (OUTPATIENT)
Dept: INTERNAL MEDICINE | Facility: CLINIC | Age: 30
End: 2025-06-05

## 2025-06-05 DIAGNOSIS — D72.829 LEUKOCYTOSIS, UNSPECIFIED TYPE: Primary | ICD-10-CM

## 2025-06-05 DIAGNOSIS — E03.9 HYPOTHYROIDISM, UNSPECIFIED TYPE: ICD-10-CM

## 2025-06-05 RX ORDER — LEVOTHYROXINE SODIUM 88 UG/1
88 TABLET ORAL
Qty: 90 TABLET | Refills: 0 | Status: SHIPPED | OUTPATIENT
Start: 2025-06-05

## 2025-06-05 NOTE — TELEPHONE ENCOUNTER
"  Caller: Isabell Oakes \"Juanjo\"    Relationship: Self    Best call back number: [phone]         What was the call regarding: PATIENT IS WANTING ARINA CÁRDENAS TO CALL BACK ABOUT THYROID RESULTS AND WHAT STEPS SHE WANTS TO TAKE NEXT FOR CARE    Is it okay if the provider responds through MyChart:       "

## 2025-06-06 DIAGNOSIS — F90.8 ADHD, ADULT RESIDUAL TYPE: Chronic | ICD-10-CM

## 2025-06-06 RX ORDER — DEXTROAMPHETAMINE SACCHARATE, AMPHETAMINE ASPARTATE MONOHYDRATE, DEXTROAMPHETAMINE SULFATE AND AMPHETAMINE SULFATE 6.25; 6.25; 6.25; 6.25 MG/1; MG/1; MG/1; MG/1
25 CAPSULE, EXTENDED RELEASE ORAL EVERY MORNING
Qty: 30 CAPSULE | Refills: 0 | Status: SHIPPED | OUTPATIENT
Start: 2025-06-06 | End: 2025-06-08 | Stop reason: SDUPTHER

## 2025-06-06 NOTE — TELEPHONE ENCOUNTER
Rx Refill Note  Requested Prescriptions     Pending Prescriptions Disp Refills    amphetamine-dextroamphetamine XR (ADDERALL XR) 25 MG 24 hr capsule 30 capsule 0     Sig: Take 1 capsule by mouth Every Morning      Last office visit with prescribing clinician: Visit date not found   Last telemedicine visit with prescribing clinician: 5/7/2025   Next office visit with prescribing clinician: Visit date not found                         Would you like a call back once the refill request has been completed: [] Yes [] No    If the office needs to give you a call back, can they leave a voicemail: [] Yes [] No    Randi Deutsch MA  06/06/25, 08:53 EDT

## 2025-06-06 NOTE — TELEPHONE ENCOUNTER
PATIENT IS GOING OUT OF TOWN ON MONDAY MORNING. SHE NEEDS TO GET AN EARLY REFILL SINCE SHE WON'T BE BACK UNTIL SUNDAY OF NEXT WEEK.    PLEASE FILL TODAY IF POSSIBLE.

## 2025-06-06 NOTE — TELEPHONE ENCOUNTER
Rx Refill Note  Requested Prescriptions     Pending Prescriptions Disp Refills    amphetamine-dextroamphetamine XR (ADDERALL XR) 25 MG 24 hr capsule 30 capsule 0     Sig: Take 1 capsule by mouth Every Morning      Last office visit with prescribing clinician: Visit date not found   Last telemedicine visit with prescribing clinician: 5/7/2025   Next office visit with prescribing clinician: Visit date not found                         Would you like a call back once the refill request has been completed: [] Yes [] No    If the office needs to give you a call back, can they leave a voicemail: [] Yes [] No    Christen Vilchis MA  06/06/25, 08:53 EDT

## 2025-06-06 NOTE — TELEPHONE ENCOUNTER
Rx Refill Note  Requested Prescriptions     Pending Prescriptions Disp Refills    amphetamine-dextroamphetamine XR (ADDERALL XR) 25 MG 24 hr capsule 30 capsule 0     Sig: Take 1 capsule by mouth Every Morning      Last office visit with prescribing clinician: Visit date not found   Last telemedicine visit with prescribing clinician: 5/7/2025   Next office visit with prescribing clinician: Visit date not found                         Would you like a call back once the refill request has been completed: [] Yes [] No    If the office needs to give you a call back, can they leave a voicemail: [] Yes [] No    Randi Deutsch MA  06/06/25, 13:35 EDT

## 2025-06-08 ENCOUNTER — TELEPHONE (OUTPATIENT)
Dept: INTERNAL MEDICINE | Facility: CLINIC | Age: 30
End: 2025-06-08

## 2025-06-08 DIAGNOSIS — F90.8 ADHD, ADULT RESIDUAL TYPE: Chronic | ICD-10-CM

## 2025-06-08 RX ORDER — DEXTROAMPHETAMINE SACCHARATE, AMPHETAMINE ASPARTATE MONOHYDRATE, DEXTROAMPHETAMINE SULFATE AND AMPHETAMINE SULFATE 6.25; 6.25; 6.25; 6.25 MG/1; MG/1; MG/1; MG/1
25 CAPSULE, EXTENDED RELEASE ORAL EVERY MORNING
Qty: 30 CAPSULE | Refills: 0 | Status: SHIPPED | OUTPATIENT
Start: 2025-06-08 | End: 2025-07-07 | Stop reason: SDUPTHER

## 2025-06-23 RX ORDER — NORETHINDRONE ACETATE AND ETHINYL ESTRADIOL 1.5; .03 MG/1; MG/1
1 TABLET ORAL DAILY
Qty: 63 TABLET | Refills: 1 | Status: SHIPPED | OUTPATIENT
Start: 2025-06-23

## 2025-07-07 DIAGNOSIS — F90.8 ADHD, ADULT RESIDUAL TYPE: Chronic | ICD-10-CM

## 2025-07-07 RX ORDER — DEXTROAMPHETAMINE SACCHARATE, AMPHETAMINE ASPARTATE MONOHYDRATE, DEXTROAMPHETAMINE SULFATE AND AMPHETAMINE SULFATE 6.25; 6.25; 6.25; 6.25 MG/1; MG/1; MG/1; MG/1
25 CAPSULE, EXTENDED RELEASE ORAL EVERY MORNING
Qty: 30 CAPSULE | Refills: 0 | Status: SHIPPED | OUTPATIENT
Start: 2025-07-07

## 2025-07-16 DIAGNOSIS — F41.1 GENERALIZED ANXIETY DISORDER: Chronic | ICD-10-CM

## 2025-07-16 DIAGNOSIS — F33.0 MILD EPISODE OF RECURRENT MAJOR DEPRESSIVE DISORDER: Chronic | ICD-10-CM

## 2025-07-16 RX ORDER — ESCITALOPRAM OXALATE 20 MG/1
20 TABLET ORAL EVERY MORNING
Qty: 90 TABLET | Refills: 1 | Status: SHIPPED | OUTPATIENT
Start: 2025-07-16

## 2025-07-16 NOTE — TELEPHONE ENCOUNTER
Rx Refill Note  Requested Prescriptions     Pending Prescriptions Disp Refills    escitalopram (LEXAPRO) 20 MG tablet [Pharmacy Med Name: ESCITALOPRAM 20 MG TABLET] 90 tablet 1     Sig: TAKE 1 TABLET BY MOUTH EVERY MORNING      Last office visit with prescribing clinician: 1/22/2025   Last telemedicine visit with prescribing clinician: 5/7/2025   Next office visit with prescribing clinician: Visit date not found                         Would you like a call back once the refill request has been completed: [] Yes [] No    If the office needs to give you a call back, can they leave a voicemail: [] Yes [] No    Bhumika Morrison MA  07/16/25, 08:29 EDT

## 2025-08-07 DIAGNOSIS — F90.8 ADHD, ADULT RESIDUAL TYPE: Chronic | ICD-10-CM

## 2025-08-07 RX ORDER — DEXTROAMPHETAMINE SACCHARATE, AMPHETAMINE ASPARTATE MONOHYDRATE, DEXTROAMPHETAMINE SULFATE AND AMPHETAMINE SULFATE 6.25; 6.25; 6.25; 6.25 MG/1; MG/1; MG/1; MG/1
25 CAPSULE, EXTENDED RELEASE ORAL EVERY MORNING
Qty: 30 CAPSULE | Refills: 0 | Status: SHIPPED | OUTPATIENT
Start: 2025-08-07